# Patient Record
Sex: FEMALE | Race: WHITE | NOT HISPANIC OR LATINO | Employment: OTHER | URBAN - NONMETROPOLITAN AREA
[De-identification: names, ages, dates, MRNs, and addresses within clinical notes are randomized per-mention and may not be internally consistent; named-entity substitution may affect disease eponyms.]

---

## 2017-01-04 ENCOUNTER — TELEPHONE (OUTPATIENT)
Dept: INTERNAL MEDICINE | Facility: CLINIC | Age: 72
End: 2017-01-04

## 2017-01-04 NOTE — TELEPHONE ENCOUNTER
----- Message from Chanel Johnson sent at 1/4/2017  1:48 PM EST -----  Contact: PATIENT  TCM  1 WEEK HOSPITAL F/U [facility]  DISCHARGED 1/3/17  APPOINTMENT 1/10/17 @10:45

## 2017-01-05 ENCOUNTER — OFFICE VISIT (OUTPATIENT)
Dept: INTERNAL MEDICINE | Facility: CLINIC | Age: 72
End: 2017-01-05

## 2017-01-05 VITALS
SYSTOLIC BLOOD PRESSURE: 134 MMHG | TEMPERATURE: 98.4 F | HEART RATE: 84 BPM | OXYGEN SATURATION: 97 % | BODY MASS INDEX: 33.61 KG/M2 | HEIGHT: 61 IN | DIASTOLIC BLOOD PRESSURE: 74 MMHG | WEIGHT: 178 LBS

## 2017-01-05 DIAGNOSIS — C54.1 ENDOMETRIAL CANCER (HCC): ICD-10-CM

## 2017-01-05 DIAGNOSIS — Z09 HOSPITAL DISCHARGE FOLLOW-UP: Primary | ICD-10-CM

## 2017-01-05 DIAGNOSIS — N18.4 CKD (CHRONIC KIDNEY DISEASE) STAGE 4, GFR 15-29 ML/MIN (HCC): ICD-10-CM

## 2017-01-05 DIAGNOSIS — N30.01 ACUTE CYSTITIS WITH HEMATURIA: ICD-10-CM

## 2017-01-05 PROCEDURE — 99214 OFFICE O/P EST MOD 30 MIN: CPT | Performed by: NURSE PRACTITIONER

## 2017-01-05 RX ORDER — LEVOFLOXACIN 750 MG/1
TABLET ORAL
COMMUNITY
Start: 2017-01-03 | End: 2017-10-24

## 2017-01-05 RX ORDER — OXYCODONE HYDROCHLORIDE AND ACETAMINOPHEN 5; 325 MG/1; MG/1
TABLET ORAL
Refills: 0 | COMMUNITY
Start: 2016-12-30 | End: 2017-06-01

## 2017-01-05 NOTE — PROGRESS NOTES
"Subjective   Pamela Herrera is a 71 y.o. female.     History of Present Illness   Pt presents today for a hospital f/u.  She had her nephrostomy tubes changed on 12/30.  She had been having more pain on the right side prior to the tubes being changed.  That night around 7 pm she spiked a temperature of 102.  She was also having increased right flank pain.  She took 3 tablets of Percocet.  She was having nausea, dry heaving, chills.  She presented to the ED at .  They admitted her for complicated UTI.  She was placed on Vancomycin, Cefepime, and Diflucan.  She has been taking Levaquin since discharge also.  She has not had a fever since 48 hours before discharge.  She has one more dose of Levaquin left.  She denies nausea, diarrhea.  She has had some constipation with antibiotics, but that is starting to get better.  Her right flank pain has resolved also.  She feels much better than she did then when she went into the hospital.  She goes on 1/19 for a PET scan.  She sees the oncologist that afternoon.    She goes on 1/9 to discuss a renal diet.  The nephrologist is wanting her to see a surgeon about having a fistula placed.  They have been discussing dialysis with her, but pt is not sure that she would like follow through with this.     Blood pressure 134/74, pulse 84, temperature 98.4 °F (36.9 °C), height 61\" (154.9 cm), weight 178 lb (80.7 kg), SpO2 97 %.       The following portions of the patient's history were reviewed and updated as appropriate: allergies, current medications, past medical history and problem list.    Review of Systems   Constitutional: Positive for fatigue. Negative for fever.   HENT: Negative.    Eyes: Negative.    Respiratory: Negative.    Cardiovascular: Negative.    Gastrointestinal: Positive for constipation.   Endocrine: Negative.    Genitourinary: Positive for flank pain (resolved now).   Skin: Negative.    Allergic/Immunologic: Negative.    Neurological: Negative.    Hematological: " Negative.    Psychiatric/Behavioral: Negative.        Objective   Physical Exam   Constitutional: She is oriented to person, place, and time. She appears well-developed and well-nourished.   HENT:   Head: Normocephalic and atraumatic.   Cardiovascular: Normal rate, regular rhythm, normal heart sounds and intact distal pulses.    Pulmonary/Chest: Effort normal and breath sounds normal.   Abdominal: Soft. Bowel sounds are normal.   Musculoskeletal:   Walks with walker.   Neurological: She is alert and oriented to person, place, and time.   Skin: Skin is warm and dry.   Psychiatric: She has a normal mood and affect. Her behavior is normal. Judgment and thought content normal.   Nursing note and vitals reviewed.      Assessment/Plan   Pamela was seen today for follow-up.    Diagnoses and all orders for this visit:    Hospital discharge follow-up- UTI resolved.  Finish Levaquin as directed.  Continue to drink adequate amounts of fluid.  Perform good hand hygiene.    Acute cystitis with hematuria- since pt has infections after every nephrostomy tube change, will call Dr. Cervantes's office ( urology) and discuss if she would like pt to take prophylactic antibiotics a few days before her tube change and a few days after.    CKD (chronic kidney disease) stage 4, GFR 15-29 ml/min- keep appts with nephrology.  Discussed with pt to seriously consider starting dialysis.  Make a list of questions and discuss it with the nephrologist.    Endometrial cancer- pt has upcoming appt with oncology.        F/U PRN.

## 2017-01-05 NOTE — MR AVS SNAPSHOT
Pamela Herrera   1/5/2017 4:45 PM   Office Visit    Provider:  DARYL De La Paz   Department:  [facility] PRIMARY CARE   Dept Phone:  [phone]                Your Full Care Plan              Your Updated Medication List          This list is accurate as of: 1/5/17  5:40 PM.  Always use your most recent med list.                amLODIPine 10 MG tablet   Commonly known as:  NORVASC   Take 1 tablet by mouth Daily. As directed       CALCIUM PO       carvedilol 25 MG tablet   Commonly known as:  COREG   Take 1 tablet by mouth 2 (Two) Times a Day.       cetirizine 10 MG tablet   Commonly known as:  zyrTEC   Take 1 tablet by mouth daily.       fluticasone 50 MCG/ACT nasal spray   Commonly known as:  FLONASE   1 spray into each nostril Daily.       HYDROcodone-acetaminophen 5-325 MG per tablet   Commonly known as:  NORCO   Take 1 tablet by mouth Every 8 (Eight) Hours As Needed for moderate pain (4-6).       levoFLOXacin 750 MG tablet   Commonly known as:  LEVAQUIN       OSTEO BI-FLEX ADV JOINT SHIELD tablet       oxyCODONE-acetaminophen 5-325 MG per tablet   Commonly known as:  PERCOCET       PRENATAL PO       sodium bicarbonate 650 MG tablet   Take 1 tablet by mouth 2 (Two) Times a Day With Meals.       triamcinolone 0.1 % cream   Commonly known as:  KENALOG   Apply  topically 1 (One) Time Per Week.               Instructions     None    Patient Instructions History      MyChart Signup     Our records indicate that you have declined [facility]. If you would like to sign up for [facility], please email [email or web address] or call [phone] to obtain an activation code.             Other Info from Your Visit           Your Appointments     Pop 10, 2017 10:45 AM [facility]   Hospital Follow Up with DARYL De La Paz   [facility] PRIMARY CARE (--)    [address]  [facility] [address] [phone]            Apr 21,  "2017  8:30 AM EDT   Follow Up with DARYL De La Paz   [facility] PRIMARY CARE (--)    [address] [place] [address] [phone]           Arrive 15 minutes prior to appointment.              Allergies     Latex      Metronidazole      Penicillins      Procaine        Reason for Visit     Follow-up hosp follow up, kidney infection from nephrostomy tube change      Vital Signs     Blood Pressure Pulse Temperature Height Weight Oxygen Saturation    134/74 84 98.4 °F (36.9 °C) 61\" (154.9 cm) 178 lb (80.7 kg) 97%    Body Mass Index Smoking Status                33.63 kg/m2 Never Smoker          "

## 2017-01-06 ENCOUNTER — TELEPHONE (OUTPATIENT)
Dept: INTERNAL MEDICINE | Facility: CLINIC | Age: 72
End: 2017-01-06

## 2017-01-06 NOTE — TELEPHONE ENCOUNTER
----- Message from Emerald Arzola sent at 1/5/2017  5:41 PM EST -----  Contact: PATIENT  Patient would like to switch PCP to Dr. Wynn.  No issues, but would like female physician.

## 2017-01-12 RX ORDER — AMLODIPINE BESYLATE 10 MG/1
TABLET ORAL
Qty: 90 TABLET | Refills: 3 | Status: SHIPPED | OUTPATIENT
Start: 2017-01-12 | End: 2017-01-16 | Stop reason: SDUPTHER

## 2017-01-12 RX ORDER — CARVEDILOL 25 MG/1
TABLET ORAL
Qty: 180 TABLET | Refills: 3 | Status: SHIPPED | OUTPATIENT
Start: 2017-01-12 | End: 2017-01-16 | Stop reason: SDUPTHER

## 2017-01-16 RX ORDER — AMLODIPINE BESYLATE 10 MG/1
TABLET ORAL
Qty: 90 TABLET | Refills: 3 | Status: SHIPPED | OUTPATIENT
Start: 2017-01-16 | End: 2017-11-15 | Stop reason: SDUPTHER

## 2017-01-16 RX ORDER — CARVEDILOL 25 MG/1
TABLET ORAL
Qty: 180 TABLET | Refills: 3 | Status: SHIPPED | OUTPATIENT
Start: 2017-01-16 | End: 2017-11-15 | Stop reason: SDUPTHER

## 2017-03-31 ENCOUNTER — TELEPHONE (OUTPATIENT)
Dept: INTERNAL MEDICINE | Facility: CLINIC | Age: 72
End: 2017-03-31

## 2017-03-31 ENCOUNTER — OFFICE VISIT (OUTPATIENT)
Dept: INTERNAL MEDICINE | Facility: CLINIC | Age: 72
End: 2017-03-31

## 2017-03-31 VITALS
BODY MASS INDEX: 35.12 KG/M2 | OXYGEN SATURATION: 97 % | WEIGHT: 186 LBS | HEART RATE: 85 BPM | DIASTOLIC BLOOD PRESSURE: 70 MMHG | HEIGHT: 61 IN | TEMPERATURE: 97.4 F | SYSTOLIC BLOOD PRESSURE: 148 MMHG

## 2017-03-31 DIAGNOSIS — Z93.6 NEPHROSTOMY STATUS (HCC): Primary | ICD-10-CM

## 2017-03-31 DIAGNOSIS — N99.528 NEPHROSTOMY COMPLICATION (HCC): ICD-10-CM

## 2017-03-31 PROCEDURE — 99213 OFFICE O/P EST LOW 20 MIN: CPT | Performed by: FAMILY MEDICINE

## 2017-03-31 NOTE — PROGRESS NOTES
Subjective   Pamela Herrera is a 71 y.o. female.     History of Present Illness   Patient comes in today with her granddaughter to follow-up on a recent hospital visit.  Patient was under the impression she had been admitted to  but it was actually only seen in the ER.  She recently underwent replacement of her nephrostomy tubes.  When she was discharged she was somewhat febrile but temperature sania to 103 and she returned to .  Dr. Jarquin with gyn/onc evaluated patient and felt she was appropriate for discharge from the ER.  Patient reports completing antibiotics that her urologist, Dr. Allen, prescribed.  She is feeling fine at this time and is here as she was told to follow-up.  She has a 6 month follow-up scheduled in April with Mary Almendarez, our nurse practitioner who is no longer here.    The following portions of the patient's history were reviewed and updated as appropriate: allergies, current medications, past family history, past medical history, past social history, past surgical history and problem list.    Review of Systems   Constitutional: Negative for fever.   Cardiovascular: Positive for leg swelling.       Objective   Physical Exam   Constitutional: Vital signs are normal. She appears well-developed. She has a sickly appearance. She does not appear ill.   HENT:   Right Ear: Hearing normal.   Left Ear: Hearing normal.   Eyes: EOM are normal. No scleral icterus.   Pulmonary/Chest: Effort normal.   Genitourinary:   Genitourinary Comments: Nephrostomy tubes entering back bilaterally, covered with bandages but appear clean, dry, no surrounding erythema. Urine noted in bags bilateral legs, yellow and clear without sediment.   Musculoskeletal: She exhibits edema (bilateral lower extremities).   Neurological: She is alert. No cranial nerve deficit.   Skin: She is not diaphoretic.   Psychiatric: She has a normal mood and affect.   Nursing note and vitals reviewed.    I reviewed her [facility] notes.    Britton stated there was nothing to do from a gynecology oncology standpoint and recommended following up if she becomes febrile again.  ER note reads the same, they recommended she complete antibiotics that were started by Dr. Allen.  I also reviewed the consult note from radiology from March 24 where they recommended nephrostomy tube exchange.    Assessment/Plan   Pamela was seen today for chronic kidney disease.    Diagnoses and all orders for this visit:    Nephrostomy status    Nephrostomy complication    No medicine changes at this time, needs to follow-up as scheduled but needs to discuss with  who she will see.  She is scheduled to see the nurse practitioner who is after practice.  Follow-up with urology as scheduled.

## 2017-04-21 ENCOUNTER — TELEPHONE (OUTPATIENT)
Dept: INTERNAL MEDICINE | Facility: CLINIC | Age: 72
End: 2017-04-21

## 2017-04-21 DIAGNOSIS — Z93.6 NEPHROSTOMY STATUS (HCC): Primary | ICD-10-CM

## 2017-04-21 NOTE — TELEPHONE ENCOUNTER
Below message sent through Ms. Reynolds's My Chart Account. I asked her to set up Ms. Herrera's proxy so that documentation can be appropriate.    This is my mother [birth year] Pamela Herrera's nephrostomy tube site. As you can see some idiot stitched through her tube to secure it to her skin. She is scheduled for tube change Friday 21 April at 12pm. She has been going to  since October 2014 and there seems to be more and more craziness that happens with her. Whether it be stupid stuff or God bless just Carey's law takes over. I am curious about switching her to a urologist, nephrologist, interventional radiologist in [place]. Is there someone that you know of that would be able to handle her complex case. I know you have access to her records but if you need clarification of anything I am willing to assist. Please call so that we can discuss [phone] Also it is time for me to get new [facility] paperwork submitted. It should be coming to your office any day.       Emi, will you see if Dr. Falcon's office can handle her issues? If so, I'll put in the referral. Thanks!

## 2017-04-21 NOTE — TELEPHONE ENCOUNTER
Dr. Falcon looked at her chart and he suggests that she see Dr. Ratliff since she has a nephrostomy tube. Apparently Dr. Falcon tends to send these patients to him.

## 2017-06-01 ENCOUNTER — OFFICE VISIT (OUTPATIENT)
Dept: INTERNAL MEDICINE | Facility: CLINIC | Age: 72
End: 2017-06-01

## 2017-06-01 ENCOUNTER — TELEPHONE (OUTPATIENT)
Dept: INTERNAL MEDICINE | Facility: CLINIC | Age: 72
End: 2017-06-01

## 2017-06-01 VITALS
OXYGEN SATURATION: 97 % | TEMPERATURE: 97.7 F | BODY MASS INDEX: 35.57 KG/M2 | HEIGHT: 61 IN | HEART RATE: 76 BPM | SYSTOLIC BLOOD PRESSURE: 138 MMHG | DIASTOLIC BLOOD PRESSURE: 70 MMHG | WEIGHT: 188.4 LBS

## 2017-06-01 DIAGNOSIS — D63.1 ANEMIA IN CHRONIC KIDNEY DISEASE: ICD-10-CM

## 2017-06-01 DIAGNOSIS — N18.4 CKD (CHRONIC KIDNEY DISEASE) STAGE 4, GFR 15-29 ML/MIN (HCC): Primary | ICD-10-CM

## 2017-06-01 DIAGNOSIS — I77.0 AV FISTULA (HCC): ICD-10-CM

## 2017-06-01 DIAGNOSIS — I10 ESSENTIAL HYPERTENSION: ICD-10-CM

## 2017-06-01 DIAGNOSIS — J30.89 ENVIRONMENTAL AND SEASONAL ALLERGIES: ICD-10-CM

## 2017-06-01 DIAGNOSIS — N18.9 ANEMIA IN CHRONIC KIDNEY DISEASE: ICD-10-CM

## 2017-06-01 PROCEDURE — 99214 OFFICE O/P EST MOD 30 MIN: CPT | Performed by: FAMILY MEDICINE

## 2017-06-01 RX ORDER — CLINDAMYCIN HYDROCHLORIDE 150 MG/1
CAPSULE ORAL
Refills: 0 | COMMUNITY
Start: 2017-04-20 | End: 2018-04-23

## 2017-06-01 RX ORDER — LORATADINE 10 MG/1
10 TABLET ORAL EVERY OTHER DAY
COMMUNITY
End: 2017-10-24

## 2017-06-01 RX ORDER — FLUCONAZOLE 150 MG/1
TABLET ORAL
Refills: 0 | COMMUNITY
Start: 2017-05-11 | End: 2017-10-24

## 2017-06-01 NOTE — TELEPHONE ENCOUNTER
CALLED PATIENT TO GET MORE INFORMATION. SHE SAID IT'S NOT REALLY PAIN IN HER LEGS, IT'S MORE OF A CRAMPING. HAS SWELLING, BUT SHE STATES THIS IS NOT USUAL FOR HER. THE CRAMPING IN HER LEGS OCCURS MAINLY AT NIGHT. NO SOA.

## 2017-06-01 NOTE — TELEPHONE ENCOUNTER
ATTEMPTED TO CALL PATIENT, NO ANSWER, LEFT A DETAILED MESSAGE OF DR VAN'S SUGGESTION. ASK HER TO CALL THE OFFICE TOMORROW MORNING IF SHE HAS ANY QUESTIONS.

## 2017-06-01 NOTE — TELEPHONE ENCOUNTER
PLEASE RETURN CALL-PATIENT HAS BEEN HAVING LEG PAIN/LEG CRAMPING AT NIGHT,AND WOULD LIKE TO KNOW IF SHE GET THE CREAM THAT HER DAUGHTER IN LAW PUT ON HER CALLED INTO THE PHARMACY? PATIENT SAID IT HELPS WITH THE CRAMPING AND PAIN. CAN YOU PLEASE CALL PAIN CREAM (COMPOUND)- LIDOCAINE POWDER, PRILOCAINE POWDER, GABAPENTIN POWDER & NABUMETONE 500 MG TAB. ALL ARE 2.25%    PLEASE SEND TO [facility]

## 2017-06-01 NOTE — PROGRESS NOTES
Subjective   Pamela Herrera is a 71 y.o. female.     History of Present Illness   Patient here to follow up. Has history of diabetes and was previously on medicine for it but sugars now running around 100 with diet alone. Has had AVF placed on left arm since last visit and her urostomy tubes replaced. Following up with vascular later today. Continues care with nephrology for CRF. No immediate needs today. Essential hypertension is well controlled with amlodipine. She's taking Claritin 10 mg daily for allergies.     The following portions of the patient's history were reviewed and updated as appropriate: allergies, current medications, past family history, past medical history, past social history, past surgical history and problem list.    Review of Systems   Constitutional: Negative for activity change.   Respiratory: Negative for shortness of breath.    Cardiovascular: Negative for chest pain.   Genitourinary: Negative for menstrual problem and vaginal bleeding.       Vitals:    06/01/17 0834   BP: 138/70   Pulse: 76   Temp: 97.7 °F (36.5 °C)   SpO2: 97%       Objective   Physical Exam   Constitutional: She is oriented to person, place, and time. Vital signs are normal. She appears well-developed and well-nourished. She is active. She has a sickly appearance. She does not appear ill.   Appears stated age. Well groomed. Obese     HENT:   Head: Normocephalic and atraumatic.   Right Ear: Hearing normal.   Left Ear: Hearing normal.   Nose: Nose normal.   Eyes: EOM and lids are normal. Pupils are equal, round, and reactive to light.   Wearing glasses     Cardiovascular: Normal rate and regular rhythm.    Murmur heard.   Systolic murmur is present with a grade of 3/6   Palpable thrill to AV fistula on left arm above antecubital fossa   Pulmonary/Chest: Effort normal and breath sounds normal. She has no rales.   Musculoskeletal: She exhibits no deformity.   Neurological: She is alert and oriented to person, place, and  time. No cranial nerve deficit. Gait normal.   Skin: Skin is warm. She is not diaphoretic. No cyanosis. There is pallor. Nails show no clubbing.        Psychiatric: She has a normal mood and affect. Her speech is normal and behavior is normal. Judgment and thought content normal. Cognition and memory are normal.   Nursing note and vitals reviewed.       Reviewed records from [facility]. PET scan in May negative for signs of cancer. Vascular study of fistula showed patency but possible stenosis. Labs show stable hemoglobin 9-10 (last check 10.0 5/10/17), last creatinine 3.88. Glucose level 112 on 5/10/17 at 1338. All glucose levels on labs appropriate/normal range.    Assessment/Plan   Pamela was seen today for chronic kidney disease.    Diagnoses and all orders for this visit:    CKD (chronic kidney disease) stage 4, GFR 15-29 ml/min    Anemia in chronic kidney disease    Essential hypertension    Environmental and seasonal allergies    AV fistula    blood pressure controlled. Continue current medicine. Discussed use of Claritin, space out to every 48 hours based on renal function. Blood levels stable, appropriate for chronic kidney disease. Follow up with specialists as scheduled, needs to discuss possible stenosis of AV fistula with vascular surgery today. Good thrill noted.            Michelle Wynn MD

## 2017-06-01 NOTE — TELEPHONE ENCOUNTER
Have her ask kidney doctor, may be electrolyte issue. Normally I have patients try magnesium supplements for this, but she needs to ask her kidney doctor first.

## 2017-06-30 ENCOUNTER — TELEPHONE (OUTPATIENT)
Dept: INTERNAL MEDICINE | Facility: CLINIC | Age: 72
End: 2017-06-30

## 2017-06-30 NOTE — TELEPHONE ENCOUNTER
I have to see her back since its been a month since that discussion. She should also ask her [facility] provider.

## 2017-06-30 NOTE — TELEPHONE ENCOUNTER
Patient called requesting to see if she could get a refill on Hydrocodone. I was unable to locate on her Rx list. She stated that she got it from [facility] and that she informed Dr Wynn that she was on it and she did not need a refill at the time. She would like a call back with a status to see if she is able to write it for her.

## 2017-07-20 ENCOUNTER — OFFICE VISIT (OUTPATIENT)
Dept: INTERNAL MEDICINE | Facility: CLINIC | Age: 72
End: 2017-07-20

## 2017-07-20 VITALS
SYSTOLIC BLOOD PRESSURE: 122 MMHG | WEIGHT: 186 LBS | OXYGEN SATURATION: 97 % | DIASTOLIC BLOOD PRESSURE: 70 MMHG | TEMPERATURE: 99.1 F | HEIGHT: 61 IN | BODY MASS INDEX: 35.12 KG/M2 | HEART RATE: 87 BPM

## 2017-07-20 DIAGNOSIS — M25.562 CHRONIC PAIN OF BOTH KNEES: Primary | ICD-10-CM

## 2017-07-20 DIAGNOSIS — M54.32 SCIATICA OF LEFT SIDE: ICD-10-CM

## 2017-07-20 DIAGNOSIS — J30.89 ENVIRONMENTAL AND SEASONAL ALLERGIES: ICD-10-CM

## 2017-07-20 DIAGNOSIS — G89.29 CHRONIC PAIN OF BOTH KNEES: Primary | ICD-10-CM

## 2017-07-20 DIAGNOSIS — M25.561 CHRONIC PAIN OF BOTH KNEES: Primary | ICD-10-CM

## 2017-07-20 DIAGNOSIS — N18.4 CKD (CHRONIC KIDNEY DISEASE) STAGE 4, GFR 15-29 ML/MIN (HCC): ICD-10-CM

## 2017-07-20 PROCEDURE — 99214 OFFICE O/P EST MOD 30 MIN: CPT | Performed by: FAMILY MEDICINE

## 2017-07-20 RX ORDER — GABAPENTIN 100 MG/1
100 CAPSULE ORAL 3 TIMES DAILY PRN
Qty: 90 CAPSULE | Refills: 2 | Status: SHIPPED | OUTPATIENT
Start: 2017-07-20 | End: 2017-10-24 | Stop reason: SDUPTHER

## 2017-07-20 RX ORDER — HYDROCODONE BITARTRATE AND ACETAMINOPHEN 5; 325 MG/1; MG/1
1 TABLET ORAL EVERY 8 HOURS PRN
Qty: 30 TABLET | Refills: 0 | Status: SHIPPED | OUTPATIENT
Start: 2017-07-20

## 2017-07-20 NOTE — PROGRESS NOTES
Subjective    Pamela Herrera is a 72 y.o. female here for:  Chief Complaint   Patient presents with   • Knee Pain     pain in both knees and feet also allergy issues     History of Present Illness   She has long-standing aching pain and swelling in both knees, left feels a bit worse than right. She suffers lymphedema in the right leg. She was seeing orthopedics in the past. Around the time of scheduling a knee replacement she was diagnosed with cancer so the knee issue got pushed aside. She is feeling better now and would like to discuss them again, though she does not feel surgery is right for her at this time. She's not tried injections. The doctor told her before her knees were bone on bone. She's been using Aspercreme and she tried some of her daughter's topical compounded medicine and it helped some. She cannot take NSAIDs due to chronic kidney disease. She has taken Norco on occasion and it helps some. She has pain that shoots down the left leg, sciatica, and she was told by her kidney doctor she could take gabapentin at lower doses. She'd like to try that.     At a past visit I told her to take her Claritin every other day due to chronic kidney disease. She stopped taking it and now she's congested and having symptoms again.    The following portions of the patient's history were reviewed and updated as appropriate: allergies, current medications, past family history, past medical history, past social history, past surgical history and problem list.    Review of Systems   Constitutional: Negative for activity change.   HENT: Positive for congestion and rhinorrhea.    Musculoskeletal: Positive for arthralgias and joint swelling.   Neurological: Positive for numbness.       Vitals:    07/20/17 1208   BP: 122/70   Pulse: 87   Temp: 99.1 °F (37.3 °C)   SpO2: 97%       Objective   Physical Exam   Constitutional: She is oriented to person, place, and time. Vital signs are normal. She appears well-developed and  well-nourished. She is active.  Non-toxic appearance. She has a sickly appearance. She does not appear ill.   Appears stated age. Well groomed. Obese.     HENT:   Head: Normocephalic and atraumatic.   Right Ear: Hearing normal.   Left Ear: Hearing normal.   Nose: Nose normal.   Eyes: EOM and lids are normal. Pupils are equal, round, and reactive to light. No scleral icterus.   Cardiovascular: Normal rate and regular rhythm.    Murmur heard.   Systolic murmur is present with a grade of 3/6   Pulmonary/Chest: Effort normal and breath sounds normal.   Genitourinary:   Genitourinary Comments: Urostomy  Bags on both legs, anchored   Musculoskeletal: She exhibits no deformity.        Right knee: She exhibits swelling and abnormal patellar mobility (severe crepitus). No tenderness found.        Left knee: She exhibits swelling, effusion and abnormal patellar mobility (severe crepitus). Tenderness found. Lateral joint line tenderness noted.   Neurological: She is alert and oriented to person, place, and time. No cranial nerve deficit. Gait normal.   Skin: Skin is warm. She is not diaphoretic. No cyanosis. Nails show no clubbing.   Psychiatric: She has a normal mood and affect. Her behavior is normal. Judgment and thought content normal.   Nursing note and vitals reviewed.    6/1/17 creatinine 3.88 at [facility] per scanned labs.    Assessment/Plan   Pamela was seen today for knee pain.    Diagnoses and all orders for this visit:    Chronic pain of both knees  Comments:  Discussed Schedule II status. Drug contract signed. RODRICK requested. Take narcotic only as needed. No ortho referral at this time.  Orders:  -     HYDROcodone-acetaminophen (NORCO) 5-325 MG per tablet; Take 1 tablet by mouth Every 8 (Eight) Hours As Needed for Moderate Pain .    Sciatica of left side  Comments:  Discussed Schedule V status. Will resume at a low dose as needed. She'll likely take mainly at night.  Orders:  -     gabapentin (NEURONTIN) 100 MG capsule;  Take 1 capsule by mouth 3 (Three) Times a Day As Needed (pain).    CKD (chronic kidney disease) stage 4, GFR 15-29 ml/min  Comments:  Follow up with nephrology. Urine in nephrostomy bags today appeared clear, light yellow.    Environmental and seasonal allergies  Comments:  Resume claritin every other day due to creatinine clearance.               Michelle Wynn MD

## 2017-10-24 ENCOUNTER — OFFICE VISIT (OUTPATIENT)
Dept: INTERNAL MEDICINE | Facility: CLINIC | Age: 72
End: 2017-10-24

## 2017-10-24 VITALS
WEIGHT: 195 LBS | RESPIRATION RATE: 14 BRPM | OXYGEN SATURATION: 96 % | HEIGHT: 61 IN | SYSTOLIC BLOOD PRESSURE: 138 MMHG | TEMPERATURE: 98 F | HEART RATE: 80 BPM | BODY MASS INDEX: 36.82 KG/M2 | DIASTOLIC BLOOD PRESSURE: 72 MMHG

## 2017-10-24 DIAGNOSIS — N18.4 CKD (CHRONIC KIDNEY DISEASE) STAGE 4, GFR 15-29 ML/MIN (HCC): ICD-10-CM

## 2017-10-24 DIAGNOSIS — I10 ESSENTIAL HYPERTENSION: ICD-10-CM

## 2017-10-24 DIAGNOSIS — Z28.21 INFLUENZA VACCINATION DECLINED: ICD-10-CM

## 2017-10-24 DIAGNOSIS — Z28.21 VACCINATION NOT CARRIED OUT BECAUSE OF PATIENT REFUSAL: ICD-10-CM

## 2017-10-24 DIAGNOSIS — M54.32 SCIATICA OF LEFT SIDE: Primary | ICD-10-CM

## 2017-10-24 PROCEDURE — 99213 OFFICE O/P EST LOW 20 MIN: CPT | Performed by: FAMILY MEDICINE

## 2017-10-24 RX ORDER — GABAPENTIN 100 MG/1
100 CAPSULE ORAL 3 TIMES DAILY PRN
Qty: 90 CAPSULE | Refills: 2 | Status: SHIPPED | OUTPATIENT
Start: 2017-10-24 | End: 2018-07-20 | Stop reason: SDUPTHER

## 2017-10-24 NOTE — PROGRESS NOTES
Subjective    Pamela Herrera is a 72 y.o. female here for:  Chief Complaint   Patient presents with   • Hypertension     History of Present Illness   Here to follow up. Has chronic pain but has not taken Norco in some time. Takes gabapentin sometimes at night. Hardly ever takes more than two a day. It helps her rest. AV fistula is going to be assessed soon for maturity. Does not need other medicine refilled. Hypertension chronic issue and controlled. Not interested in other preventative health measures at this time.    The following portions of the patient's history were reviewed and updated as appropriate: allergies, current medications, past family history, past medical history, past social history, past surgical history and problem list.    Review of Systems   Constitutional: Positive for fatigue.   Musculoskeletal: Positive for arthralgias.   Neurological: Positive for weakness.       Vitals:    10/24/17 1617   BP: 138/72   Pulse: 80   Resp: 14   Temp: 98 °F (36.7 °C)   SpO2: 96%       Objective   Physical Exam   Constitutional: She is oriented to person, place, and time. Vital signs are normal. She appears well-developed and well-nourished. She is active.  Non-toxic appearance. She has a sickly appearance. She does not appear ill.   Appears stated age. Well groomed. Obese.   HENT:   Head: Normocephalic and atraumatic.   Right Ear: Hearing normal.   Left Ear: Hearing normal.   Nose: Nose normal.   Eyes: EOM and lids are normal. Pupils are equal, round, and reactive to light. No scleral icterus.   Wearing glasses.   Cardiovascular: Normal rate and regular rhythm.    Murmur heard.   Systolic murmur is present with a grade of 3/6   Pulmonary/Chest: Effort normal and breath sounds normal.   Genitourinary:   Genitourinary Comments: Urostomy  Bags on both legs, anchored   Musculoskeletal: She exhibits no deformity.   Neurological: She is alert and oriented to person, place, and time. No cranial nerve deficit. Gait  (requires use of rolling walker) abnormal.   Skin: Skin is warm. She is not diaphoretic. No cyanosis. There is pallor (baseline).   Psychiatric: She has a normal mood and affect. Her behavior is normal. Judgment and thought content normal.   Nursing note and vitals reviewed.      Assessment/Plan   Pamela was seen today for hypertension.    Diagnoses and all orders for this visit:    Sciatica of left side  Comments:  Mainly taking at night. Can refill by call in between visits.  Orders:  -     gabapentin (NEURONTIN) 100 MG capsule; Take 1 capsule by mouth 3 (Three) Times a Day As Needed (pain).    CKD (chronic kidney disease) stage 4, GFR 15-29 ml/min    Essential hypertension    Influenza vaccination declined    Vaccination not carried out because of patient refusal      RODRICK reviewed and appropriate.  Completed some fmla forms for daughter so she can transport and help with mom's care  Can call for next gabapentin refill, I'd like to keep her away from the doctor's office as much as I can to prevent further illness  Follow up with all specialists as scheduled  Blood pressure controlled, follow up with nephrology  Does not want flu or pneumonia shots.         Michelle Wynn MD

## 2017-10-28 DIAGNOSIS — M54.32 SCIATICA OF LEFT SIDE: ICD-10-CM

## 2017-10-30 RX ORDER — GABAPENTIN 100 MG/1
CAPSULE ORAL
Qty: 90 CAPSULE | Refills: 2 | OUTPATIENT
Start: 2017-10-30

## 2017-11-16 RX ORDER — AMLODIPINE BESYLATE 10 MG/1
TABLET ORAL
Qty: 90 TABLET | Refills: 2 | Status: SHIPPED | OUTPATIENT
Start: 2017-11-16 | End: 2018-07-30 | Stop reason: SDUPTHER

## 2017-11-16 RX ORDER — CARVEDILOL 25 MG/1
TABLET ORAL
Qty: 180 TABLET | Refills: 2 | Status: SHIPPED | OUTPATIENT
Start: 2017-11-16 | End: 2018-07-30 | Stop reason: SDUPTHER

## 2017-12-01 ENCOUNTER — TELEPHONE (OUTPATIENT)
Dept: INTERNAL MEDICINE | Facility: CLINIC | Age: 72
End: 2017-12-01

## 2017-12-01 RX ORDER — TRIAMCINOLONE ACETONIDE 1 MG/G
CREAM TOPICAL WEEKLY
Qty: 45 G | Refills: 5 | Status: SHIPPED | OUTPATIENT
Start: 2017-12-01

## 2018-01-05 RX ORDER — FLUTICASONE PROPIONATE 50 MCG
1 SPRAY, SUSPENSION (ML) NASAL DAILY
Qty: 3 BOTTLE | Refills: 3 | Status: SHIPPED | OUTPATIENT
Start: 2018-01-05 | End: 2019-10-16 | Stop reason: SDUPTHER

## 2018-04-16 ENCOUNTER — OFFICE VISIT (OUTPATIENT)
Dept: INTERNAL MEDICINE | Facility: CLINIC | Age: 73
End: 2018-04-16

## 2018-04-16 VITALS
SYSTOLIC BLOOD PRESSURE: 138 MMHG | OXYGEN SATURATION: 100 % | HEIGHT: 61 IN | TEMPERATURE: 96.7 F | DIASTOLIC BLOOD PRESSURE: 72 MMHG | BODY MASS INDEX: 37.29 KG/M2 | RESPIRATION RATE: 16 BRPM | WEIGHT: 197.5 LBS | HEART RATE: 72 BPM

## 2018-04-16 DIAGNOSIS — K92.2 GASTROINTESTINAL HEMORRHAGE, UNSPECIFIED GASTROINTESTINAL HEMORRHAGE TYPE: ICD-10-CM

## 2018-04-16 DIAGNOSIS — R21 SKIN ERUPTION: ICD-10-CM

## 2018-04-16 DIAGNOSIS — Z09 HOSPITAL DISCHARGE FOLLOW-UP: Primary | ICD-10-CM

## 2018-04-16 LAB
ALBUMIN SERPL-MCNC: 3.2 G/DL (ref 3.5–5)
ALBUMIN/GLOB SERPL: 1.2 G/DL (ref 1–2)
ALP SERPL-CCNC: 62 U/L (ref 38–126)
ALT SERPL-CCNC: 21 U/L (ref 13–69)
AST SERPL-CCNC: 15 U/L (ref 15–46)
BILIRUB SERPL-MCNC: 0.3 MG/DL (ref 0.2–1.3)
BUN SERPL-MCNC: 56 MG/DL (ref 7–20)
BUN/CREAT SERPL: 15.1 (ref 7.1–23.5)
CALCIUM SERPL-MCNC: 8.1 MG/DL (ref 8.4–10.2)
CHLORIDE SERPL-SCNC: 106 MMOL/L (ref 98–107)
CO2 SERPL-SCNC: 24 MMOL/L (ref 26–30)
CREAT SERPL-MCNC: 3.7 MG/DL (ref 0.6–1.3)
ERYTHROCYTE [DISTWIDTH] IN BLOOD BY AUTOMATED COUNT: 15.4 % (ref 11.5–14.5)
GFR SERPLBLD CREATININE-BSD FMLA CKD-EPI: 12 ML/MIN/1.73
GFR SERPLBLD CREATININE-BSD FMLA CKD-EPI: 15 ML/MIN/1.73
GLOBULIN SER CALC-MCNC: 2.6 GM/DL
GLUCOSE SERPL-MCNC: 91 MG/DL (ref 74–98)
HCT VFR BLD AUTO: 29.3 % (ref 37–47)
HGB BLD-MCNC: 9.5 G/DL (ref 12–16)
MCH RBC QN AUTO: 30 PG (ref 27–31)
MCHC RBC AUTO-ENTMCNC: 32.4 G/DL (ref 30–37)
MCV RBC AUTO: 92.4 FL (ref 81–99)
PLATELET # BLD AUTO: 139 10*3/MM3 (ref 130–400)
POTASSIUM SERPL-SCNC: 5 MMOL/L (ref 3.5–5.1)
PROT SERPL-MCNC: 5.8 G/DL (ref 6.3–8.2)
RBC # BLD AUTO: 3.17 10*6/MM3 (ref 4.2–5.4)
SODIUM SERPL-SCNC: 141 MMOL/L (ref 137–145)
WBC # BLD AUTO: 7.19 10*3/MM3 (ref 4.8–10.8)

## 2018-04-16 PROCEDURE — 99214 OFFICE O/P EST MOD 30 MIN: CPT | Performed by: NURSE PRACTITIONER

## 2018-04-16 RX ORDER — SODIUM BICARBONATE 650 MG/1
1300 TABLET ORAL 2 TIMES DAILY
COMMUNITY
End: 2020-01-21

## 2018-04-16 RX ORDER — PANTOPRAZOLE SODIUM 40 MG/1
40 TABLET, DELAYED RELEASE ORAL DAILY
COMMUNITY
End: 2018-10-25 | Stop reason: SDUPTHER

## 2018-04-16 RX ORDER — LEVOFLOXACIN 250 MG/1
TABLET ORAL
Refills: 0 | COMMUNITY
Start: 2018-03-13 | End: 2018-04-23

## 2018-04-16 NOTE — PROGRESS NOTES
Chief Complaint / Reason:      Chief Complaint   Patient presents with   • GI Bleeding     hospital f/u-GI bleed       Subjective     HPI  Patient presents today for hospital follow-up regarding GI bleed.  Admission day was April 9 and discharge date April 15.  She is accompanied by her daughter who is a nurse.  She states that symptoms started Sunday night and she had 4-6 bloody bowel movements and she progressively worsened.  The patient went back and forth from [facility] to .  She was admitted to the [facility] initially and she required 4 units of packed red blood as her H&H was 6.5 and then came down to 6.1.  She ended up requiring 3 more units of packed red blood cells.  She had an EGD and colonoscopy.  CTA was conducted and active intestinal bleeding site identified in the distal ileum.  According to the daughter she stated that they had told her that she had a small intestinal bleed and it was true clean down.  She had nuclear med study and interventional radiology.  Also it was found that she had diverticulosis in the sigmoid colon and proximal ascending colon superficial ulcerations in the sigmoid colon which were biopsied.  Solitary ulcer versus superficial scope trauma and ascending colon with mild oozing and a clip was placed.  By the time of discharge patient had received a total of 7 units total transfusion.  Baseline hemoglobin is 9-10.  Patient was started on Protonix daily We will repeat CBC today.  Patient has a rash and when she did not have going into the hospital.  It developed following discharge.  Colonoscopy 4/10/18  EGD 4/10/18  CTA 4/11/18  IR attempted embolization 4/12/18  Colonoscopy 4/13/18  History taken from: patient    PMH/FH/Social History were reviewed and updated appropriately in the electronic medical record.     Review of Systems:   Review of Systems   Constitutional: Positive for fatigue.   Respiratory: Negative.    Cardiovascular: Negative.     Gastrointestinal: Negative.    Musculoskeletal: Positive for arthralgias and myalgias.   Skin: Positive for rash.   Neurological: Positive for weakness.     All other systems were reviewed and are negative.  Exceptions are noted in the subjective or above.      Objective     Vital Signs  Vitals:    04/16/18 1315   BP: 138/72   Pulse: 72   Resp: 16   Temp: 96.7 °F (35.9 °C)   SpO2: 100%       Body mass index is 37.32 kg/m².    Physical Exam   Constitutional: She is oriented to person, place, and time. Vital signs are normal. She appears well-developed and well-nourished. She is active.  Non-toxic appearance. She has a sickly appearance. She does not appear ill. No distress.   Appears stated age. Well groomed. Obese.     HENT:   Head: Normocephalic and atraumatic.   Right Ear: Hearing normal.   Left Ear: Hearing normal.   Nose: Nose normal.   Eyes: EOM and lids are normal. Pupils are equal, round, and reactive to light. No scleral icterus.   Cardiovascular: Normal rate, regular rhythm, normal heart sounds and intact distal pulses.       Systolic murmur is present   Pulmonary/Chest: Effort normal and breath sounds normal. She has no wheezes. She exhibits no tenderness.   Genitourinary:   Genitourinary Comments: Urostomy  Bags on both legs, anchored. Right urostomy bag had sediment and is very cloudy.   Musculoskeletal: She exhibits no deformity.   Neurological: She is alert and oriented to person, place, and time. No cranial nerve deficit. Gait normal.   Skin: Skin is warm and dry. Petechiae and rash noted. Rash is maculopapular. She is not diaphoretic. There is erythema. No cyanosis. No pallor. Nails show no clubbing.        Psychiatric: She has a normal mood and affect. Her behavior is normal. Judgment and thought content normal.   Nursing note and vitals reviewed.       Results Review:    I reviewed the patient's previous clinical results and discharge information from .       Medication Review:     Current  Outpatient Prescriptions:   •  amLODIPine (NORVASC) 10 MG tablet, TAKE 1 TABLET EVERY DAY, Disp: 90 tablet, Rfl: 2  •  CALCIUM PO, Take  by mouth., Disp: , Rfl:   •  carvedilol (COREG) 25 MG tablet, TAKE 1 TABLET BY MOUTH TWICE A DAY, Disp: 180 tablet, Rfl: 2  •  fluticasone (FLONASE) 50 MCG/ACT nasal spray, 1 spray into each nostril Daily., Disp: 3 bottle, Rfl: 3  •  gabapentin (NEURONTIN) 100 MG capsule, Take 1 capsule by mouth 3 (Three) Times a Day As Needed (pain)., Disp: 90 capsule, Rfl: 2  •  HYDROcodone-acetaminophen (NORCO) 5-325 MG per tablet, Take 1 tablet by mouth Every 8 (Eight) Hours As Needed for Moderate Pain ., Disp: 30 tablet, Rfl: 0  •  Misc Natural Products (OSTEO BI-FLEX ADV JOINT SHIELD) tablet, Take  by mouth., Disp: , Rfl:   •  pantoprazole (PROTONIX) 40 MG EC tablet, Take 40 mg by mouth Daily., Disp: , Rfl:   •  Prenatal Vit-Fe Fumarate-FA (PRENATAL PO), Take  by mouth., Disp: , Rfl:   •  Probiotic Product (PROBIOTIC-10 PO), Take  by mouth., Disp: , Rfl:   •  sodium bicarbonate 650 MG tablet, Take 1 tablet by mouth 2 (Two) Times a Day With Meals., Disp: 60 tablet, Rfl: 5  •  sodium bicarbonate 650 MG tablet, Take 1,300 mg by mouth 2 (Two) Times a Day., Disp: , Rfl:   •  triamcinolone (KENALOG) 0.1 % cream, Apply  topically 1 (One) Time Per Week., Disp: 45 g, Rfl: 5    Assessment/Plan   Pamela was seen today for gi bleeding.    Diagnoses and all orders for this visit:    Hospital discharge follow-up  Reviewed hospital discharge and lab results.  Gastrointestinal hemorrhage, unspecified gastrointestinal hemorrhage type  -     Comprehensive Metabolic Panel  -     CBC (No Diff)    Skin eruption  Closely monitor and discussed worsening signs and symptoms.  Discussed possibility of rash related to blood transfusions    Return if symptoms worsen or fail to improve.    Radha Santana, APRN  04/16/2018

## 2018-04-23 ENCOUNTER — OFFICE VISIT (OUTPATIENT)
Dept: INTERNAL MEDICINE | Facility: CLINIC | Age: 73
End: 2018-04-23

## 2018-04-23 ENCOUNTER — HOSPITAL ENCOUNTER (OUTPATIENT)
Dept: CARDIOLOGY | Facility: HOSPITAL | Age: 73
Discharge: HOME | End: 2018-04-23
Attending: FAMILY MEDICINE | Admitting: FAMILY MEDICINE

## 2018-04-23 VITALS
SYSTOLIC BLOOD PRESSURE: 124 MMHG | HEIGHT: 61 IN | DIASTOLIC BLOOD PRESSURE: 70 MMHG | WEIGHT: 200 LBS | TEMPERATURE: 97.6 F | RESPIRATION RATE: 14 BRPM | HEART RATE: 86 BPM | BODY MASS INDEX: 37.76 KG/M2 | OXYGEN SATURATION: 98 %

## 2018-04-23 DIAGNOSIS — I89.0 LYMPHEDEMA OF RIGHT LOWER EXTREMITY: ICD-10-CM

## 2018-04-23 DIAGNOSIS — I10 ESSENTIAL HYPERTENSION: ICD-10-CM

## 2018-04-23 DIAGNOSIS — I89.0 LYMPHEDEMA OF RIGHT LOWER EXTREMITY: Primary | ICD-10-CM

## 2018-04-23 PROCEDURE — 93971 EXTREMITY STUDY: CPT | Performed by: INTERNAL MEDICINE

## 2018-04-23 PROCEDURE — 93971 EXTREMITY STUDY: CPT

## 2018-04-23 PROCEDURE — 99213 OFFICE O/P EST LOW 20 MIN: CPT | Performed by: FAMILY MEDICINE

## 2018-04-24 PROBLEM — I89.0 LYMPHEDEMA OF RIGHT LOWER EXTREMITY: Status: ACTIVE | Noted: 2018-04-24

## 2018-04-24 LAB
BH CV ECHO MEAS - BSA(HAYCOCK): 2 M^2
BH CV ECHO MEAS - BSA: 1.9 M^2
BH CV ECHO MEAS - BZI_BMI: 37.8 KILOGRAMS/M^2
BH CV ECHO MEAS - BZI_METRIC_HEIGHT: 154.9 CM
BH CV ECHO MEAS - BZI_METRIC_WEIGHT: 90.7 KG
BH CV LOWER VASCULAR LEFT COMMON FEMORAL AUGMENT: NORMAL
BH CV LOWER VASCULAR LEFT COMMON FEMORAL COMPRESS: NORMAL
BH CV LOWER VASCULAR LEFT COMMON FEMORAL PHASIC: NORMAL
BH CV LOWER VASCULAR LEFT COMMON FEMORAL SPONT: NORMAL
BH CV LOWER VASCULAR RIGHT COMMON FEMORAL AUGMENT: NORMAL
BH CV LOWER VASCULAR RIGHT COMMON FEMORAL COMPRESS: NORMAL
BH CV LOWER VASCULAR RIGHT COMMON FEMORAL PHASIC: NORMAL
BH CV LOWER VASCULAR RIGHT COMMON FEMORAL SPONT: NORMAL
BH CV LOWER VASCULAR RIGHT DISTAL FEMORAL COMPRESS: NORMAL
BH CV LOWER VASCULAR RIGHT GASTRONEMIUS COMPRESS: NORMAL
BH CV LOWER VASCULAR RIGHT GREATER SAPH AK COMPRESS: NORMAL
BH CV LOWER VASCULAR RIGHT GREATER SAPH BK COMPRESS: NORMAL
BH CV LOWER VASCULAR RIGHT LESSER SAPH COMPRESS: NORMAL
BH CV LOWER VASCULAR RIGHT MID FEMORAL AUGMENT: NORMAL
BH CV LOWER VASCULAR RIGHT MID FEMORAL COMPRESS: NORMAL
BH CV LOWER VASCULAR RIGHT MID FEMORAL PHASIC: NORMAL
BH CV LOWER VASCULAR RIGHT MID FEMORAL SPONT: NORMAL
BH CV LOWER VASCULAR RIGHT PERONEAL COMPRESS: NORMAL
BH CV LOWER VASCULAR RIGHT POPLITEAL AUGMENT: NORMAL
BH CV LOWER VASCULAR RIGHT POPLITEAL COMPRESS: NORMAL
BH CV LOWER VASCULAR RIGHT POPLITEAL PHASIC: NORMAL
BH CV LOWER VASCULAR RIGHT POPLITEAL SPONT: NORMAL
BH CV LOWER VASCULAR RIGHT POSTERIOR TIBIAL COMPRESS: NORMAL
BH CV LOWER VASCULAR RIGHT PROFUNDA FEMORAL COMPRESS: NORMAL
BH CV LOWER VASCULAR RIGHT PROXIMAL FEMORAL COMPRESS: NORMAL
BH CV LOWER VASCULAR RIGHT SAPHENOFEMORAL JUNCTION AUGMENT: NORMAL
BH CV LOWER VASCULAR RIGHT SAPHENOFEMORAL JUNCTION COMPRESS: NORMAL
BH CV LOWER VASCULAR RIGHT SAPHENOFEMORAL JUNCTION PHASIC: NORMAL
BH CV LOWER VASCULAR RIGHT SAPHENOFEMORAL JUNCTION SPONT: NORMAL

## 2018-04-24 NOTE — PROGRESS NOTES
"Subjective    Pamela Herrera is a 72 y.o. female here for:  Chief Complaint   Patient presents with   • Hypertension     Hypertension   This is a chronic problem. The current episode started more than 1 year ago. The problem is unchanged. The problem is controlled. Associated symptoms include peripheral edema (Lymphedema to right lower extremity is worsening.). Pertinent negatives include no chest pain. There are no associated agents to hypertension. Risk factors for coronary artery disease include obesity, sedentary lifestyle, stress and post-menopausal state. Current antihypertension treatment includes calcium channel blockers and beta blockers. Compliance problems include exercise.  Hypertensive end-organ damage includes kidney disease. Identifiable causes of hypertension include chronic renal disease.      Since last admission at  her lymphedema to the lower right extremity has worsened. Improved mildly with her lymphedema pump device, compression stocking being worn but not helping much. Interested in physical therapy for this. She's not had a blood clot but no calf pain. Risk factor is her recent admission.    The following portions of the patient's history were reviewed and updated as appropriate: allergies, current medications, past family history, past medical history, past social history, past surgical history and problem list.    Review of Systems   Constitutional: Positive for fatigue.   Cardiovascular: Positive for leg swelling. Negative for chest pain.   Musculoskeletal: Positive for arthralgias.       Vitals:    04/23/18 1552   BP: 124/70   Pulse: 86   Resp: 14   Temp: 97.6 °F (36.4 °C)   SpO2: 98%   Weight: 90.7 kg (200 lb)   Height: 154.9 cm (60.98\")         Objective   Physical Exam   Constitutional: She is oriented to person, place, and time. Vital signs are normal. She appears well-developed and well-nourished. She is active.  Non-toxic appearance. She has a sickly appearance. She does not " appear ill. No distress. She is obese.  HENT:   Head: Normocephalic and atraumatic.   Right Ear: Hearing normal.   Left Ear: Hearing normal.   Mouth/Throat: Mucous membranes are not dry.   Eyes: EOM and lids are normal. No scleral icterus.   Neck: Neck supple.   Pulmonary/Chest: Effort normal.   Genitourinary:   Genitourinary Comments: Urine collection bag anchored on right calf area   Musculoskeletal:        Right lower leg: She exhibits edema. She exhibits no tenderness.   Neurological: She is alert and oriented to person, place, and time. No cranial nerve deficit. Gait abnormal.   Skin: Skin is warm. She is not diaphoretic.   Psychiatric: She has a normal mood and affect. Her behavior is normal.   Nursing note and vitals reviewed.        Assessment/Plan     Problem List Items Addressed This Visit        Cardiovascular and Mediastinum    Essential hypertension    Current Assessment & Plan     Hypertension is improving with treatment.  Continue current treatment regimen.  Blood pressure will be reassessed at the next regular appointment.            Other    Lymphedema of right lower extremity - Primary    Current Assessment & Plan     Duplex has ruled out underlying clot. Continue compression stocking, home treatments.         Relevant Orders    Duplex Venous Lower Extremity - Right CAR (Completed)    Ambulatory Referral to Physical Therapy Lymphedema      Other Visit Diagnoses    None.             · Patient has been erroneously marked as diabetic. Based on the available clinical information, she does not have diabetes and should therefore be excluded from diabetic health maintenance and quality measures for the remainder of the reporting period.  ·     Return in about 6 months (around 10/23/2018) for Medicare Wellness.    Michelle Wynn MD    Please note that portions of this note may have been completed with a voice recognition program. Efforts were made to edit dictation, but occasionally words are  mistranscribed.

## 2018-04-25 NOTE — PROGRESS NOTES
I was able to get in touch with patient's daughter and she stated that the rash was gone and that she had previously saw the lab results on my chart.  She states that the nephrostomy tube output has since improved.

## 2018-04-25 NOTE — PROGRESS NOTES
Please contact patient and let her know lab results.  Her daughter also would probably like to know so if she is available you can inform her.  Her lab results appear to be improved compared to previous ones from [facility].  Please ask her how the rash is doing and see if it's went away.  See if her right nephrostomy tube improved in color and she had a lot of sediment in it was very cloudy.

## 2018-05-10 ENCOUNTER — APPOINTMENT (OUTPATIENT)
Dept: PHYSICAL THERAPY | Facility: HOSPITAL | Age: 73
End: 2018-05-10

## 2018-07-18 DIAGNOSIS — M54.32 SCIATICA OF LEFT SIDE: ICD-10-CM

## 2018-07-18 RX ORDER — GABAPENTIN 100 MG/1
CAPSULE ORAL
Qty: 90 CAPSULE | Status: CANCELLED | OUTPATIENT
Start: 2018-07-18

## 2018-07-19 DIAGNOSIS — M54.32 SCIATICA OF LEFT SIDE: ICD-10-CM

## 2018-07-19 RX ORDER — GABAPENTIN 100 MG/1
100 CAPSULE ORAL 3 TIMES DAILY PRN
Qty: 90 CAPSULE | Refills: 2 | Status: CANCELLED | OUTPATIENT
Start: 2018-07-19

## 2018-07-20 DIAGNOSIS — M54.32 SCIATICA OF LEFT SIDE: ICD-10-CM

## 2018-07-20 RX ORDER — GABAPENTIN 100 MG/1
CAPSULE ORAL
Qty: 90 CAPSULE | Refills: 2 | Status: SHIPPED | OUTPATIENT
Start: 2018-07-20 | End: 2019-01-28 | Stop reason: SDUPTHER

## 2018-07-30 RX ORDER — AMLODIPINE BESYLATE 10 MG/1
TABLET ORAL
Qty: 90 TABLET | Refills: 2 | Status: SHIPPED | OUTPATIENT
Start: 2018-07-30 | End: 2019-04-14 | Stop reason: SDUPTHER

## 2018-07-30 RX ORDER — CARVEDILOL 25 MG/1
TABLET ORAL
Qty: 180 TABLET | Refills: 2 | Status: SHIPPED | OUTPATIENT
Start: 2018-07-30 | End: 2019-05-05 | Stop reason: SDUPTHER

## 2018-08-17 ENCOUNTER — EPISODE CHANGES (OUTPATIENT)
Dept: CASE MANAGEMENT | Facility: OTHER | Age: 73
End: 2018-08-17

## 2018-10-25 ENCOUNTER — OFFICE VISIT (OUTPATIENT)
Dept: INTERNAL MEDICINE | Facility: CLINIC | Age: 73
End: 2018-10-25

## 2018-10-25 VITALS
HEIGHT: 61 IN | TEMPERATURE: 98.5 F | OXYGEN SATURATION: 99 % | HEART RATE: 76 BPM | BODY MASS INDEX: 40.22 KG/M2 | DIASTOLIC BLOOD PRESSURE: 82 MMHG | WEIGHT: 213 LBS | SYSTOLIC BLOOD PRESSURE: 150 MMHG

## 2018-10-25 DIAGNOSIS — Z93.6 NEPHROSTOMY STATUS (HCC): ICD-10-CM

## 2018-10-25 DIAGNOSIS — D63.1 ANEMIA DUE TO STAGE 5 CHRONIC KIDNEY DISEASE, NOT ON CHRONIC DIALYSIS (HCC): ICD-10-CM

## 2018-10-25 DIAGNOSIS — I77.0 AV FISTULA (HCC): ICD-10-CM

## 2018-10-25 DIAGNOSIS — I89.0 LYMPHEDEMA OF BOTH LOWER EXTREMITIES: ICD-10-CM

## 2018-10-25 DIAGNOSIS — N18.5 CKD (CHRONIC KIDNEY DISEASE) STAGE 5, GFR LESS THAN 15 ML/MIN (HCC): ICD-10-CM

## 2018-10-25 DIAGNOSIS — N18.5 ANEMIA DUE TO STAGE 5 CHRONIC KIDNEY DISEASE, NOT ON CHRONIC DIALYSIS (HCC): ICD-10-CM

## 2018-10-25 DIAGNOSIS — N28.89 HYPERTENSION SECONDARY TO OTHER RENAL DISORDERS: Primary | ICD-10-CM

## 2018-10-25 DIAGNOSIS — I15.1 HYPERTENSION SECONDARY TO OTHER RENAL DISORDERS: Primary | ICD-10-CM

## 2018-10-25 PROCEDURE — 99214 OFFICE O/P EST MOD 30 MIN: CPT | Performed by: PHYSICIAN ASSISTANT

## 2018-10-25 RX ORDER — CLINDAMYCIN HYDROCHLORIDE 150 MG/1
CAPSULE ORAL
Refills: 0 | COMMUNITY
Start: 2018-09-07 | End: 2019-01-28

## 2018-10-25 RX ORDER — PANTOPRAZOLE SODIUM 40 MG/1
40 TABLET, DELAYED RELEASE ORAL DAILY
Qty: 30 TABLET | Refills: 5 | Status: SHIPPED | OUTPATIENT
Start: 2018-10-25 | End: 2019-04-02 | Stop reason: SDUPTHER

## 2018-10-25 RX ORDER — LEVOFLOXACIN 250 MG/1
TABLET ORAL
Refills: 0 | COMMUNITY
Start: 2018-09-07 | End: 2019-01-28

## 2018-10-25 NOTE — PROGRESS NOTES
Pamela Herrera is a 73 y.o. female.     Subjective   History of Present Illness   Here today accompanied by her daughter for follow up of hypertension and other chronic conditions.     HTN: Blood pressure is elevated today but normally stays around 130/80 when checked at her many appointments with specialists. She is taking amlodipine and carvedilol as directed. Patient denies chest pain, dyspnea, orthopnea, palpitations, headaches, weakness, visual disturbances or confusion.     Lymphedema: She has chronic bilateral leg lymphedema secondary to cancer treatment. She has had increased difficulty ambulating as well as getting in and out of vehicles in the last few months but particularly troublesome in the last week. She and her daughter agree that she may benefit from physical therapy for lymphedema but do not feel that she can safely get in and out of a car to attend appointments at this time. She also has chronic bilateral knee pain due to arthritis which seems to be worsening and contributing to poor ambulatory status.     CKD stage 5: managed by a nephrologist at , Dr. Cespedes.  Last creatinine around 3.4 with GFR of 11 per labs provided on her daughter's cell phone from [facility]. She has an AV fistula in place but has not yet started hemodialysis. On labs she is noted to have anemia secondary to CKD which is also monitored by nephrology. She previously saw urology due to bilateral nephrostomy but now has nephrology managing it as well. She received a monthly prescription for Levaquin and Clindamycin to minimize potential infections.             The following portions of the patient's history were reviewed and updated as appropriate: allergies, current medications, past family history, past medical history, past social history, past surgical history and problem list.    Review of Systems    Constitutional: fatigue. Negative for appetite change, chills, fever and unexpected weight change.   Respiratory:  "Negative for cough, chest tightness, shortness of breath and wheezing.    Cardiovascular: legs swelling.  Negative for chest pain, palpitations.  Gastrointestinal: Negative for abdominal distention, abdominal pain, blood in stool, constipation, diarrhea, nausea and vomiting.   Endocrine: Negative for cold intolerance, heat intolerance, polydipsia, polyphagia and polyuria.   Musculoskeletal: bilateral knee pain.  Negative for back pain, joint swelling, myalgias, neck pain and neck stiffness.   Skin: Negative for color change, pallor, rash.  Allergic/Immunologic: Negative for environmental allergies, food allergies and immunocompromised state.   Neurological: Negative for dizziness, tremors, seizures, weakness, numbness and headaches.   Hematological: Negative for adenopathy. Does not bruise/bleed easily.   Psychiatric/Behavioral: Negative for sleep disturbances, agitation, behavioral problems, confusion, hallucinations, self-injury and suicidal ideas. The patient is not nervous/anxious.      Objective    Physical Exam  Constitutional: Appears well-developed and well-nourished.   Cardiovascular: Normal rate, regular rhythm and normal heart sounds.    Pulmonary/Chest: Effort normal and breath sounds normal. No respiratory distress.  Has no wheezes or rales. Exhibits no chest wall tenderness.   Abdominal: nephrostomy bilaterally. Soft. Bowel sounds are normal. Exhibits no distension and no mass. There is no tenderness.   Musculoskeletal: prominent lymphedema with tenderness of bilateral legs, right worse than left. Ambulates with a walker. Decreased range of motion of legs.   Neurological: Alert and oriented to person, place, and time.   Skin: Skin is warm and dry.   Psychiatric: Has a normal mood and affect. Behavior is normal. Judgment and thought content normal.       /82   Pulse 76   Temp 98.5 °F (36.9 °C)   Ht 154.9 cm (60.98\")   Wt 96.6 kg (213 lb)   SpO2 99%   BMI 40.27 kg/m²     Nursing note and " vitals reviewed.        Assessment/Plan   Pamela was seen today for follow-up and hypertension.    Diagnoses and all orders for this visit:    Hypertension, secondary to other renal disorders  Managed by nephrology, typically well controlled with amlodipine and carvedilol. Asymptomatic today. Will refrain from changing medications at this time as she has a follow up with nephrology within the next few weeks.     Lymphedema of both lower extremities  -     Ambulatory Referral to Home Health    Lymphedema of bilateral lower extremities is causing her extreme difficulty with ambulation and even more so with transferring in and out of vehicles, making attending physical therapy appointments problematic. She should benefit greatly from in home physical therapy.     Arthritis  Bilateral knees, worsening and contributing to poor ambulatory status.     CKD (chronic kidney disease) stage 5, GFR less than 15 ml/min (CMS/[facility])  AV fistula (CMS/[facility])  Anemia of chronic kidney disease, stage 5, not on dialysis  managed by a nephrologist at , Dr. Cespedes.  Last creatinine around 3.4 with GFR of 11 per labs provided on her daughter's cell phone from  portal. She has an AV fistula in place but has not yet started hemodialysis.    Nephrostomy status (CMS/[facility])  She previously saw urology due to bilateral nephrostomy but now has nephrology managing it as well. She received a monthly prescription for Levaquin and Clindamycin to minimize potential infections.       She declined influenza vaccination today.     Encouraged her to return for medicare wellness visit with Dr. Wynn in 3 months.

## 2018-12-04 ENCOUNTER — OUTSIDE FACILITY SERVICE (OUTPATIENT)
Dept: INTERNAL MEDICINE | Facility: CLINIC | Age: 73
End: 2018-12-04

## 2018-12-04 PROCEDURE — G0180 MD CERTIFICATION HHA PATIENT: HCPCS | Performed by: FAMILY MEDICINE

## 2018-12-27 ENCOUNTER — TELEPHONE (OUTPATIENT)
Dept: INTERNAL MEDICINE | Facility: CLINIC | Age: 73
End: 2018-12-27

## 2018-12-27 NOTE — TELEPHONE ENCOUNTER
Natalie from [facility] called and stated that she would like to do 4 more weeks of PT with patient and then follow up to see how patient is doing.

## 2019-01-18 ENCOUNTER — TRANSITIONAL CARE MANAGEMENT TELEPHONE ENCOUNTER (OUTPATIENT)
Dept: INTERNAL MEDICINE | Facility: CLINIC | Age: 74
End: 2019-01-18

## 2019-01-22 ENCOUNTER — OUTSIDE FACILITY SERVICE (OUTPATIENT)
Dept: INTERNAL MEDICINE | Facility: CLINIC | Age: 74
End: 2019-01-22

## 2019-01-22 ENCOUNTER — TELEPHONE (OUTPATIENT)
Dept: INTERNAL MEDICINE | Facility: CLINIC | Age: 74
End: 2019-01-22

## 2019-01-22 PROCEDURE — G0179 MD RECERTIFICATION HHA PT: HCPCS | Performed by: FAMILY MEDICINE

## 2019-01-22 NOTE — TELEPHONE ENCOUNTER
Patients daughter calling in regards to [facility] paperwork. I stated to daughter that I will call her once it is finished and faxed

## 2019-01-28 ENCOUNTER — OFFICE VISIT (OUTPATIENT)
Dept: INTERNAL MEDICINE | Facility: CLINIC | Age: 74
End: 2019-01-28

## 2019-01-28 VITALS
RESPIRATION RATE: 16 BRPM | HEART RATE: 78 BPM | HEIGHT: 61 IN | SYSTOLIC BLOOD PRESSURE: 152 MMHG | DIASTOLIC BLOOD PRESSURE: 66 MMHG | BODY MASS INDEX: 37.62 KG/M2 | OXYGEN SATURATION: 97 % | WEIGHT: 199.25 LBS

## 2019-01-28 DIAGNOSIS — N18.6 ESRD (END STAGE RENAL DISEASE) ON DIALYSIS (HCC): ICD-10-CM

## 2019-01-28 DIAGNOSIS — Z09 HOSPITAL DISCHARGE FOLLOW-UP: ICD-10-CM

## 2019-01-28 DIAGNOSIS — Z93.6 NEPHROSTOMY STATUS (HCC): ICD-10-CM

## 2019-01-28 DIAGNOSIS — I77.0 AV FISTULA (HCC): ICD-10-CM

## 2019-01-28 DIAGNOSIS — I12.0 BENIGN HYPERTENSION WITH END-STAGE RENAL DISEASE (HCC): ICD-10-CM

## 2019-01-28 DIAGNOSIS — I89.0 LYMPHEDEMA OF BOTH LOWER EXTREMITIES: ICD-10-CM

## 2019-01-28 DIAGNOSIS — E87.70 HYPERVOLEMIA ASSOCIATED WITH RENAL INSUFFICIENCY: ICD-10-CM

## 2019-01-28 DIAGNOSIS — N18.6 BENIGN HYPERTENSION WITH END-STAGE RENAL DISEASE (HCC): ICD-10-CM

## 2019-01-28 DIAGNOSIS — C54.1 ENDOMETRIAL CANCER (HCC): ICD-10-CM

## 2019-01-28 DIAGNOSIS — D63.1 ANEMIA, CHRONIC RENAL FAILURE, STAGE 5 (HCC): ICD-10-CM

## 2019-01-28 DIAGNOSIS — E66.01 CLASS 2 SEVERE OBESITY WITH SERIOUS COMORBIDITY AND BODY MASS INDEX (BMI) OF 37.0 TO 37.9 IN ADULT, UNSPECIFIED OBESITY TYPE (HCC): ICD-10-CM

## 2019-01-28 DIAGNOSIS — N28.9 HYPERVOLEMIA ASSOCIATED WITH RENAL INSUFFICIENCY: ICD-10-CM

## 2019-01-28 DIAGNOSIS — N18.5 ANEMIA, CHRONIC RENAL FAILURE, STAGE 5 (HCC): ICD-10-CM

## 2019-01-28 DIAGNOSIS — Z99.2 ESRD (END STAGE RENAL DISEASE) ON DIALYSIS (HCC): ICD-10-CM

## 2019-01-28 DIAGNOSIS — Z00.00 MEDICARE ANNUAL WELLNESS VISIT, SUBSEQUENT: Primary | ICD-10-CM

## 2019-01-28 DIAGNOSIS — M54.32 SCIATICA OF LEFT SIDE: ICD-10-CM

## 2019-01-28 DIAGNOSIS — M17.0 PRIMARY OSTEOARTHRITIS OF BOTH KNEES: ICD-10-CM

## 2019-01-28 PROBLEM — E66.812 CLASS 2 SEVERE OBESITY WITH SERIOUS COMORBIDITY IN ADULT: Status: ACTIVE | Noted: 2019-01-28

## 2019-01-28 PROCEDURE — 99495 TRANSJ CARE MGMT MOD F2F 14D: CPT | Performed by: FAMILY MEDICINE

## 2019-01-28 PROCEDURE — G0439 PPPS, SUBSEQ VISIT: HCPCS | Performed by: FAMILY MEDICINE

## 2019-01-28 RX ORDER — FUROSEMIDE 80 MG
TABLET ORAL
COMMUNITY
Start: 2019-01-17 | End: 2020-01-21

## 2019-01-28 RX ORDER — GABAPENTIN 100 MG/1
100 CAPSULE ORAL 3 TIMES DAILY PRN
Qty: 270 CAPSULE | Refills: 1 | Status: SHIPPED | OUTPATIENT
Start: 2019-01-28 | End: 2019-10-17 | Stop reason: SDUPTHER

## 2019-01-28 NOTE — PROGRESS NOTES
Transitional Care Follow Up Visit  Subjective     Pamela Herrera is a 73 y.o. female who presents for a transitional care management visit.    Within 48 business hours after discharge our office contacted her via telephone to coordinate her care and needs.      I reviewed and discussed the details of that call along with the discharge summary, hospital problems, inpatient lab results, inpatient diagnostic studies, and consultation reports with Pamela.     Current outpatient and discharge medications have been reconciled for the patient.    Date of TCM Phone Call 1/22/2019   [facility]   Date of Admission 1/11/2019   Date of Discharge 1/17/2019   Discharge Disposition Home-Health Care [facility]     Risk for Readmission (LACE) No Data Recorded    History of Present Illness   Course During Hospital Stay:  Patient was admitted to  on January 11 and discharged January 17.  Discharge diagnoses included pyelonephritis, end-stage renal disease, hypertension, anemia of chronic disease, hypervolemia.  She presented with a one-week history of worsening shortness of air and bilateral lower extremity edema.  She was declared end-stage renal disease by the renal team at  and dialysis was initiated.  She has an AV fistula in the left arm.  Her fistula infiltrated when used for dialysis and was not felt to be mature enough for continued hemodialysis.  Tunnel dialysis catheter was placed by interventional radiology and she underwent hemodialysis on Laura 15.  She had good urine output with nephrostomy tubes and had diuresis with IV Lasix.  She was discharged with 80 mg of Lasix daily and started on calcitriol 3 times a week with dialysis.  She is already followed up with dialysis in [place].  Upon admission she was noted to have costovertebral angle pain and foul-smelling discharge from nephrostomy tubes, she was treated empirically for pyelonephritis with IV Rocephin and then transitioned to Ceftin ear.     The  following portions of the patient's history were reviewed and updated as appropriate: allergies, current medications, past family history, past medical history, past social history, past surgical history and problem list.    Review of Systems   Constitutional: Positive for fatigue.   Respiratory: Negative for shortness of breath.    Genitourinary: Negative for dysuria.   All other systems reviewed and are negative.      Objective   Physical Exam   Constitutional: She is oriented to person, place, and time. Vital signs are normal. She appears well-developed and well-nourished. She is active. She has a sickly appearance. She does not appear ill.   Appears stated age. Well groomed.    HENT:   Head: Normocephalic and atraumatic. Hair is normal (dry).   Right Ear: Hearing normal.   Left Ear: Hearing normal.   Nose: Nose normal.   Eyes: EOM and lids are normal. Pupils are equal, round, and reactive to light. No scleral icterus.   Neck: Phonation normal. Neck supple.   Cardiovascular: Normal rate, regular rhythm and normal heart sounds. Exam reveals no gallop and no friction rub.   No murmur heard.  Pulmonary/Chest: Effort normal and breath sounds normal.   Genitourinary:   Genitourinary Comments: Urine bags anchored to bilateral lower legs   Musculoskeletal: She exhibits no deformity.        Right lower leg: She exhibits edema.        Left lower leg: She exhibits edema.   Neurological: She is alert and oriented to person, place, and time. She displays no tremor. No cranial nerve deficit. Gait normal.   Skin: Skin is warm. No rash noted. She is not diaphoretic. No cyanosis. There is pallor (baseline pallor). Nails show no clubbing.   Psychiatric: She has a normal mood and affect. Her speech is normal and behavior is normal. Judgment and thought content normal. Cognition and memory are normal.   Nursing note and vitals reviewed.      Assessment/Plan   Pamela was seen today for medicare wellness-subsequent, hypertension,  edema, sciatica and congestive heart failure.    Diagnoses and all orders for this visit:    Medicare annual wellness visit, subsequent    ESRD (end stage renal disease) on dialysis (CMS/HCC)    Benign hypertension with end-stage renal disease (CMS/HCC)    Anemia, chronic renal failure, stage 5 (CMS/HCC)    AV fistula (CMS/HCC)    Nephrostomy status (CMS/HCC)    Hypervolemia associated with renal insufficiency    Endometrial cancer (CMS/HCC)    Sciatica of left side  Comments:  Mainly taking at night. Can refill by call in between visits.  Orders:  -     gabapentin (NEURONTIN) 100 MG capsule; Take 1 capsule by mouth 3 (Three) Times a Day As Needed (nerve pain).    Lymphedema of both lower extremities    Primary osteoarthritis of both knees    Class 2 severe obesity with serious comorbidity and body mass index (BMI) of 37.0 to 37.9 in adult, unspecified obesity type (CMS/HCC)    Hospital discharge follow-up      · see second note from today for her Medicare wellness visit which was also completed today  · Follow up with dialysis as scheduled, good thrill noted today to her AV fistula in the left upper extremity  · Follow-up with nephrology regarding elevated blood pressure

## 2019-01-28 NOTE — PROGRESS NOTES
QUICK REFERENCE INFORMATION:  The ABCs of the Annual Wellness Visit    Subsequent Medicare Wellness Visit    HEALTH RISK ASSESSMENT    1945    Recent Hospitalizations:  Recently treated at the following:  Other: [facility].        Current Medical Providers:  Patient Care Team:  Michelle Wynn MD as PCP - General (Family Medicine)  Ok Kunz MD as Surgeon (Orthopedic Surgery)        Smoking Status:  Social History     Tobacco Use   Smoking Status Never Smoker   Smokeless Tobacco Never Used       Alcohol Consumption:  Social History     Substance and Sexual Activity   Alcohol Use No       Depression Screen:   PHQ-2/PHQ-9 Depression Screening 1/28/2019   Little interest or pleasure in doing things 1   Feeling down, depressed, or hopeless 0   Total Score 1       Health Habits and Functional and Cognitive Screening:  Functional & Cognitive Status 1/28/2019   Do you have difficulty preparing food and eating? No   Do you have difficulty bathing yourself, getting dressed or grooming yourself? No   Do you have difficulty using the toilet? No   Do you have difficulty moving around from place to place? No   Do you have trouble with steps or getting out of a bed or a chair? Yes   In the past year have you fallen or experienced a near fall? Yes   Current Diet Unhealthy Diet   Dental Exam Up to date   Eye Exam Not up to date   Exercise (times per week) 0 times per week   Current Exercise Activities Include None   Do you need help using the phone?  No   Are you deaf or do you have serious difficulty hearing?  No   Do you need help with transportation? Yes   Do you need help shopping? Yes   Do you need help preparing meals?  No   Do you need help with housework?  No   Do you need help with laundry? Yes   Do you need help taking your medications? No   Do you need help managing money? No   Do you ever drive or ride in a car without wearing a seat belt? No           Does the patient have evidence of cognitive  impairment? No    Aspirin use counseling: Does not need ASA (and currently is not on it)      Recent Lab Results:  CMP:  Lab Results   Component Value Date    GLU 91 04/16/2018    BUN 56 (H) 04/16/2018    CREATININE 3.70 (H) 04/16/2018    EGFRIFNONA 12 (L) 04/16/2018    EGFRIFAFRI 15 (L) 04/16/2018    BCR 15.1 04/16/2018     04/16/2018    K 5.0 04/16/2018    CO2 24.0 (L) 04/16/2018    CALCIUM 8.1 (L) 04/16/2018    PROTENTOTREF 5.8 (L) 04/16/2018    ALBUMIN 3.20 (L) 04/16/2018    LABGLOBREF 2.6 04/16/2018    LABIL2 1.2 04/16/2018    BILITOT 0.3 04/16/2018    ALKPHOS 62 04/16/2018    AST 15 04/16/2018    ALT 21 04/16/2018     Lipid Panel:     HbA1c:  Lab Results   Component Value Date    HGBA1C 5.3 04/17/2015       Visual Acuity:  No exam data present    Age-appropriate Screening Schedule:  Refer to the list below for future screening recommendations based on patient's age, sex and/or medical conditions. Orders for these recommended tests are listed in the plan section. The patient has been provided with a written plan.    Health Maintenance   Topic Date Due   • COLONOSCOPY  04/13/2028   • TDAP/TD VACCINES (2 - Td) 10/11/2028   • INFLUENZA VACCINE  Addressed   • PNEUMOCOCCAL VACCINES (65+ LOW/MEDIUM RISK)  Discontinued   • MAMMOGRAM  Discontinued   • ZOSTER VACCINE  Discontinued        Subjective   History of Present Illness    Pamela Herrera is a 73 y.o. female who presents for an Subsequent Wellness Visit.  She is a complex patient with many chronic issues.  She was recently diagnosed with end-stage renal disease and started on hemodialysis through a tunneled catheter during her admission at .  There is an AV fistula in the left upper extremity which has a good thrill but was infiltrated during her recent admission.  She has nephrostomy tubes bilaterally following treatment for endometrial cancer, surgical complications.  She continues to produce urine at this time.  She has chronic pain with osteoarthritis  of bilateral knees.  She has been taking hydrocodone here or there as needed for this pain and it helps.  Also has essential hypertension associated with her renal disease, blood pressure is somewhat elevated today.  On Lasix for her lymphedema of the lower extremities, it does seem to help, diuresis at  recently noted improvement.    The following portions of the patient's history were reviewed and updated as appropriate: allergies, current medications, past family history, past medical history, past social history, past surgical history and problem list.    Outpatient Medications Prior to Visit   Medication Sig Dispense Refill   • amLODIPine (NORVASC) 10 MG tablet TAKE 1 TABLET BY MOUTH DAILY 90 tablet 2   • CALCIUM PO Take  by mouth.     • carvedilol (COREG) 25 MG tablet TAKE 1 TABLET BY MOUTH TWICE A  tablet 2   • COLLAGEN-VITAMIN C PO Take  by mouth.     • furosemide (LASIX) 80 MG tablet      • HYDROcodone-acetaminophen (NORCO) 5-325 MG per tablet Take 1 tablet by mouth Every 8 (Eight) Hours As Needed for Moderate Pain . 30 tablet 0   • Misc Natural Products (OSTEO BI-FLEX ADV JOINT SHIELD) tablet Take  by mouth.     • pantoprazole (PROTONIX) 40 MG EC tablet Take 1 tablet by mouth Daily. 30 tablet 5   • Prenatal Vit-Fe Fumarate-FA (PRENATAL PO) Take  by mouth.     • Probiotic Product (PROBIOTIC-10 PO) Take  by mouth.     • sodium bicarbonate 650 MG tablet Take 1,300 mg by mouth 2 (Two) Times a Day.     • triamcinolone (KENALOG) 0.1 % cream Apply  topically 1 (One) Time Per Week. 45 g 5   • VOLTAREN 1 % gel gel      • gabapentin (NEURONTIN) 100 MG capsule TAKE 1 CAPSULE BY MOUTH 3 TIMES PER DAY AS NEEDED FOR PAIN 90 capsule 2   • fluticasone (FLONASE) 50 MCG/ACT nasal spray 1 spray into each nostril Daily. 3 bottle 3   • clindamycin (CLEOCIN) 150 MG capsule TAKE ONE CAPSULE BY MOUTH EVERY 6 HOURS  0   • levoFLOXacin (LEVAQUIN) 250 MG tablet TAKE 2 TABLETS BY MOUTH DAY BEFORE SURGERY, THEN TAKE 1 TABLET  THE DAY AFTER  0     No facility-administered medications prior to visit.        Patient Active Problem List   Diagnosis   • ESRD (end stage renal disease) on dialysis (CMS/[facility])   • Anemia, chronic renal failure, stage 5 (CMS/[facility])   • Benign hypertension with end-stage renal disease (CMS/[facility])   • Arthritis   • Endometrial cancer (CMS/[facility])   • Lymphedema of both lower extremities   • Nephrostomy status (CMS/[facility])   • Environmental and seasonal allergies   • AV fistula (CMS/[facility])   • Primary osteoarthritis of both knees   • Sciatica of left side   • Class 2 severe obesity with serious comorbidity in adult (CMS/[facility])       Advance Care Planning:  has an advance directive - a copy has been provided and is in file    Identification of Risk Factors:  Risk factors include: weight , cardiovascular risk, inactivity, chronic pain and polypharmacy.    Review of Systems   Constitutional: Positive for activity change, fatigue and unexpected weight change. Negative for fever.   Respiratory: Positive for shortness of breath (improved).    Genitourinary: Positive for difficulty urinating (chronic).   All other systems reviewed and are negative.      Compared to one year ago, the patient feels her physical health is worse.  Compared to one year ago, the patient feels her mental health is the same.    Objective     Physical Exam   Constitutional: She is oriented to person, place, and time. Vital signs are normal. She appears well-developed and well-nourished. She is active.  Non-toxic appearance. She has a sickly appearance. She does not appear ill. No distress.   Appears stated age. Well groomed.    HENT:   Head: Normocephalic and atraumatic. Hair is normal.   Right Ear: Hearing, tympanic membrane, external ear and ear canal normal.   Left Ear: Hearing, tympanic membrane, external ear and ear canal normal.   Nose: Nose normal.   Eyes: EOM and lids are normal. Pupils are equal, round, and reactive to light. No scleral icterus.   Neck:  "Phonation normal. Neck supple.   Cardiovascular: Normal rate, regular rhythm and normal heart sounds. Exam reveals no gallop and no friction rub.   No murmur heard.  Pulmonary/Chest: Effort normal and breath sounds normal.   Abdominal: Soft. Bowel sounds are normal. There is no tenderness.   Musculoskeletal: She exhibits no deformity.        Right lower leg: She exhibits edema.        Left lower leg: She exhibits edema.   Neurological: She is alert and oriented to person, place, and time. She displays no tremor. No cranial nerve deficit. Gait abnormal.   Skin: Skin is warm and dry. No rash noted. She is not diaphoretic. No cyanosis. There is pallor. Nails show no clubbing.   Psychiatric: She has a normal mood and affect. Her speech is normal and behavior is normal. Judgment and thought content normal. Cognition and memory are normal.   Nursing note and vitals reviewed.      Vitals:    01/28/19 0852   BP: 152/66   Pulse: 78   Resp: 16   SpO2: 97%   Weight: 90.4 kg (199 lb 4 oz)   Height: 154.9 cm (60.98\")   PainSc:   5   PainLoc: Knee       Patient's Body mass index is 37.67 kg/m². BMI is above normal parameters. Recommendations include: no follow-up required.      Assessment/Plan   Patient Self-Management and Personalized Health Advice  The patient has been provided with information about: weight management and prevention of cardiac or vascular disease and preventive services including:   · Counseling for cardiovascular disease risk reduction.    Visit Diagnoses:    ICD-10-CM ICD-9-CM   1. Medicare annual wellness visit, subsequent Z00.00 V70.0   2. ESRD (end stage renal disease) on dialysis (CMS/[facility]) N18.6 585.6    Z99.2 V45.11   3. Benign hypertension with end-stage renal disease (CMS/[facility]) I12.0 403.11    N18.6 585.6   4. Anemia, chronic renal failure, stage 5 (CMS/[facility]) N18.5 285.21    D63.1 585.5   5. AV fistula (CMS/[facility]) I77.0 447.0   6. Nephrostomy status (CMS/[facility]) Z93.6 V44.6   7. Hypervolemia associated with " renal insufficiency E87.70 276.69    N28.9 593.9   8. Endometrial cancer (CMS/[facility]) C54.1 182.0   9. Sciatica of left side M54.32 724.3   10. Lymphedema of both lower extremities I89.0 457.1   11. Primary osteoarthritis of both knees M17.0 715.16   12. Class 2 severe obesity with serious comorbidity and body mass index (BMI) of 37.0 to 37.9 in adult, unspecified obesity type (CMS/[facility]) E66.01 278.01    Z68.37 V85.37   13. Hospital discharge follow-up Z09 V67.59       No orders of the defined types were placed in this encounter.      Outpatient Encounter Medications as of 1/28/2019   Medication Sig Dispense Refill   • amLODIPine (NORVASC) 10 MG tablet TAKE 1 TABLET BY MOUTH DAILY 90 tablet 2   • CALCIUM PO Take  by mouth.     • carvedilol (COREG) 25 MG tablet TAKE 1 TABLET BY MOUTH TWICE A  tablet 2   • COLLAGEN-VITAMIN C PO Take  by mouth.     • furosemide (LASIX) 80 MG tablet      • gabapentin (NEURONTIN) 100 MG capsule Take 1 capsule by mouth 3 (Three) Times a Day As Needed (nerve pain). 270 capsule 1   • HYDROcodone-acetaminophen (NORCO) 5-325 MG per tablet Take 1 tablet by mouth Every 8 (Eight) Hours As Needed for Moderate Pain . 30 tablet 0   • Misc Natural Products (OSTEO BI-FLEX ADV JOINT SHIELD) tablet Take  by mouth.     • pantoprazole (PROTONIX) 40 MG EC tablet Take 1 tablet by mouth Daily. 30 tablet 5   • Prenatal Vit-Fe Fumarate-FA (PRENATAL PO) Take  by mouth.     • Probiotic Product (PROBIOTIC-10 PO) Take  by mouth.     • sodium bicarbonate 650 MG tablet Take 1,300 mg by mouth 2 (Two) Times a Day.     • triamcinolone (KENALOG) 0.1 % cream Apply  topically 1 (One) Time Per Week. 45 g 5   • VOLTAREN 1 % gel gel      • [DISCONTINUED] gabapentin (NEURONTIN) 100 MG capsule TAKE 1 CAPSULE BY MOUTH 3 TIMES PER DAY AS NEEDED FOR PAIN 90 capsule 2   • fluticasone (FLONASE) 50 MCG/ACT nasal spray 1 spray into each nostril Daily. 3 bottle 3   • [DISCONTINUED] clindamycin (CLEOCIN) 150 MG capsule TAKE ONE  CAPSULE BY MOUTH EVERY 6 HOURS  0   • [DISCONTINUED] levoFLOXacin (LEVAQUIN) 250 MG tablet TAKE 2 TABLETS BY MOUTH DAY BEFORE SURGERY, THEN TAKE 1 TABLET THE DAY AFTER  0     No facility-administered encounter medications on file as of 1/28/2019.      · Follow up with all specialists as scheduled  · Reviewed  admission notes, see other note from today for hospital follow-up    Reviewed use of high risk medication in the elderly: yes  Reviewed for potential of harmful drug interactions in the elderly: yes    Follow Up:  Return in about 1 year (around 1/29/2020) for Medicare Wellness.     An After Visit Summary and PPPS with all of these plans were given to the patient.

## 2019-04-02 RX ORDER — PANTOPRAZOLE SODIUM 40 MG/1
TABLET, DELAYED RELEASE ORAL
Qty: 30 TABLET | Refills: 5 | Status: SHIPPED | OUTPATIENT
Start: 2019-04-02 | End: 2019-09-30 | Stop reason: SDUPTHER

## 2019-04-15 RX ORDER — AMLODIPINE BESYLATE 10 MG/1
TABLET ORAL
Qty: 90 TABLET | Refills: 2 | Status: SHIPPED | OUTPATIENT
Start: 2019-04-15 | End: 2020-01-08

## 2019-05-03 NOTE — TELEPHONE ENCOUNTER
----- Message from Cielo Sheppard sent at 3/31/2017  4:25 PM EDT -----  PATIENT IS WANTING TO TRANSFER CARE TO DR VAN. IS THIS OK?   [Takes medication as prescribed] : takes [None] : Patient does not have any barriers to medication adherence

## 2019-05-05 RX ORDER — CARVEDILOL 25 MG/1
TABLET ORAL
Qty: 180 TABLET | Refills: 3 | Status: SHIPPED | OUTPATIENT
Start: 2019-05-05 | End: 2020-11-23

## 2019-05-08 NOTE — TELEPHONE ENCOUNTER
Patient called to get a refill of her prescription for Voltaren.  She was also wondering if [facility] can fill it or does it have to go through [facility]?  Please call to update.  Thank you.

## 2019-08-07 ENCOUNTER — OFFICE VISIT (OUTPATIENT)
Dept: INTERNAL MEDICINE | Facility: CLINIC | Age: 74
End: 2019-08-07

## 2019-08-07 VITALS
SYSTOLIC BLOOD PRESSURE: 124 MMHG | WEIGHT: 188.13 LBS | DIASTOLIC BLOOD PRESSURE: 72 MMHG | HEIGHT: 61 IN | OXYGEN SATURATION: 97 % | HEART RATE: 77 BPM | RESPIRATION RATE: 16 BRPM | BODY MASS INDEX: 35.52 KG/M2 | TEMPERATURE: 97.7 F

## 2019-08-07 DIAGNOSIS — D48.9 NEOPLASM, UNCERTAIN WHETHER BENIGN OR MALIGNANT: Primary | ICD-10-CM

## 2019-08-07 DIAGNOSIS — I77.0 AV FISTULA (HCC): ICD-10-CM

## 2019-08-07 PROCEDURE — 11301 SHAVE SKIN LESION 0.6-1.0 CM: CPT | Performed by: FAMILY MEDICINE

## 2019-08-07 PROCEDURE — 99213 OFFICE O/P EST LOW 20 MIN: CPT | Performed by: FAMILY MEDICINE

## 2019-08-07 NOTE — PROGRESS NOTES
"Subjective    Pamela Herrera is a 74 y.o. female here for:    Chief Complaint   Patient presents with   • Abrasion     bilateral arm warts that she would like to have looked at        History of Present Illness     Two skin lesions growing over time, she thinks they may be seed warts. One on each arm, one on left forearm is worst. Itchy to a degree. Has av fistula left arm but was told she could get lesion removed with local anesthesia.    The following portions of the patient's history were reviewed and updated as appropriate: allergies, current medications, past family history, past medical history, past social history, past surgical history and problem list.    Review of Systems   Constitutional: Positive for fatigue.   Musculoskeletal: Positive for arthralgias.   Skin: Positive for dry skin and skin lesions.   Neurological: Positive for weakness.       Vitals:    08/07/19 0917   BP: 124/72   Pulse: 77   Resp: 16   Temp: 97.7 °F (36.5 °C)   TempSrc: Temporal   SpO2: 97%   Weight: 85.3 kg (188 lb 2 oz)   Height: 154.9 cm (60.98\")     Patient's Body mass index is 35.56 kg/m². BMI is above normal parameters. Recommendations include: educational material.        Objective     Physical Exam   Constitutional: She is oriented to person, place, and time. Vital signs are normal. She appears well-developed and well-nourished. She is active.  Non-toxic appearance. She does not appear ill. No distress. She is obese.  HENT:   Head: Normocephalic and atraumatic.   Right Ear: Hearing normal.   Left Ear: Hearing normal.   Mouth/Throat: Mucous membranes are not dry.   Eyes: EOM are normal. No scleral icterus.   Neck: Phonation normal. Neck supple.   Pulmonary/Chest: Effort normal.   Neurological: She is alert and oriented to person, place, and time. She displays no tremor. No cranial nerve deficit.   In wheelchair   Skin: Skin is warm. Bruising (mid upper left arm and antecubital fossa) noted. No rash noted. She is not " diaphoretic. No pallor (baseline pallor).        Left forearm lesion 10 mm x 7 mm  Right forearm lesion 7 mm x 6 mm   Psychiatric: She has a normal mood and affect. Her speech is normal and behavior is normal. Judgment and thought content normal. Cognition and memory are normal.   Nursing note and vitals reviewed.    Biopsy  Date/Time: 8/7/2019 9:30 AM  Performed by: Michelle Wynn MD  Authorized by: Michelle Wynn MD     Procedure Details - Skin Biopsy:     Body area: upper extremity    Upper extremity location: R lower arm    Initial size (mm): 7    Final defect size (mm): 8    Malignancy: malignancy unknown      Destruction method: shave biopsy      Comments:   Local anesthesia with lidocaine 1% + epinephrine 1 ml. EBL minimal. Bandage applied after hemostasis achieved. Signs of secondary infection discussed, follow up for any issues. Lesion sent for pathology.    Biopsy  Date/Time: 8/7/2019 9:30 AM  Performed by: Michelle Wynn MD  Authorized by: Michelle Wynn MD     Procedure Details - Skin Biopsy:     Body area: upper extremity    Upper extremity location: L lower arm    Initial size (mm): 10    Final defect size (mm): 11    Malignancy: malignancy unknown      Destruction method: shave biopsy      Comments:   Local anesthesia with lidocaine 1% + epinephrine 1 ml. EBL minimal. Bandage applied after hemostasis achieved. Signs of secondary infection discussed, follow up for any issues. Lesion sent for pathology.        Assessment/Plan     Problem List Items Addressed This Visit        Cardiovascular and Mediastinum    AV fistula (CMS/[facility])    Overview     Left upper extremity.           Other Visit Diagnoses     Neoplasm, uncertain whether benign or malignant    -  Primary    Relevant Orders    Biopsy    Biopsy            Michelle Wynn MD

## 2019-09-30 RX ORDER — PANTOPRAZOLE SODIUM 40 MG/1
TABLET, DELAYED RELEASE ORAL
Qty: 90 TABLET | Refills: 1 | Status: SHIPPED | OUTPATIENT
Start: 2019-09-30 | End: 2020-01-21

## 2019-10-14 ENCOUNTER — OFFICE VISIT (OUTPATIENT)
Dept: INTERNAL MEDICINE | Facility: CLINIC | Age: 74
End: 2019-10-14

## 2019-10-14 VITALS
HEIGHT: 61 IN | TEMPERATURE: 97.3 F | WEIGHT: 185 LBS | OXYGEN SATURATION: 96 % | HEART RATE: 94 BPM | DIASTOLIC BLOOD PRESSURE: 80 MMHG | SYSTOLIC BLOOD PRESSURE: 145 MMHG | BODY MASS INDEX: 34.93 KG/M2

## 2019-10-14 DIAGNOSIS — H61.23 EXCESSIVE CERUMEN IN BOTH EAR CANALS: Primary | ICD-10-CM

## 2019-10-14 PROCEDURE — 99213 OFFICE O/P EST LOW 20 MIN: CPT | Performed by: NURSE PRACTITIONER

## 2019-10-14 PROCEDURE — 69210 REMOVE IMPACTED EAR WAX UNI: CPT | Performed by: NURSE PRACTITIONER

## 2019-10-14 NOTE — PROGRESS NOTES
"Date: 10/14/2019    Name: Pamela Herrera  : 1945    Chief Complaint:   Chief Complaint   Patient presents with   • Ear Fullness     rt ear       HPI: Patient presents to clinic today with right ear fullness and decreased hearing in right ear for a week.  She has had no drainage from ear, rhinorrhea, sore throat, headache, dizziness, cough.  She does have postnasal drip.  Has noticed she is unable to \"pull wax\" out of her ear, which she is normally able to do.      History:  The following portions of the patient's history were reviewed and updated as appropriate: allergies, current medications, past medical history, family history, surgical history, social history and problem list.     ROS:  Review of Systems   Constitutional: Negative for appetite change, chills and fever.   HENT: Negative for sinus pressure, sneezing and tinnitus.        VS:  Vitals:    10/14/19 1640   BP: 145/80   Pulse: 94   Temp: 97.3 °F (36.3 °C)   TempSrc: Temporal   SpO2: 96%   Weight: 83.9 kg (185 lb)   Height: 154.9 cm (60.98\")     Body mass index is 34.98 kg/m².    PE:  Physical Exam   Constitutional: She is oriented to person, place, and time. She appears well-developed and well-nourished. No distress.   HENT:   Head: Normocephalic.   Right Ear: External ear normal. cerumen impaction is present.  Left Ear: Tympanic membrane and external ear normal.   Nose: Mucosal edema present. No sinus tenderness.   Mouth/Throat: Uvula is midline and mucous membranes are normal. Posterior oropharyngeal erythema present.   Excess soft yellow cerumen in left ear canal, able to visualize TM; unable to visualize right TM due to cerumen impaction   Eyes: Conjunctivae and EOM are normal. Pupils are equal, round, and reactive to light.   Neck: Neck supple.   Cardiovascular: Normal rate, regular rhythm, normal heart sounds and intact distal pulses.   Pulmonary/Chest: Effort normal and breath sounds normal.   Lymphadenopathy:     She has no cervical " adenopathy.   Neurological: She is alert and oriented to person, place, and time.   Skin:   Chest dialysis port covered with dressing, left AV fistula positive bruit and thrill   Ear Cerumen Removal  Date/Time: 10/14/2019 5:00 PM  Performed by: Radha Zheng APRN  Authorized by: Radha Zheng APRN   Consent: Verbal consent obtained.  Consent given by: patient  Patient understanding: patient states understanding of the procedure being performed  Required items: required blood products, implants, devices, and special equipment available  Patient identity confirmed: verbally with patient    Anesthesia:  Local Anesthetic: none  Location details: left ear and right ear  Patient tolerance: Patient tolerated the procedure well with no immediate complications  Comments: Cerumen impaction not dislodged by irrigation or use of instrumentation in right ear.  Left ear clear of cerumen at this time.  Procedure type: instrumentation and irrigation   Sedation:  Patient sedated: no            Assessment/Plan:  Pamela was seen today for ear fullness.    Diagnoses and all orders for this visit:    Excessive cerumen in both ear canals  -     carbamide peroxide (DEBROX) 6.5 % otic solution; Administer 5 drops into both ears 2 (Two) Times a Day for 2 days.  -     Ear Cerumen Removal  - Advised to discontinue use of cotton tipped applicators in ears      Return in about 2 days (around 10/16/2019) for Next scheduled follow up.

## 2019-10-16 ENCOUNTER — OFFICE VISIT (OUTPATIENT)
Dept: INTERNAL MEDICINE | Facility: CLINIC | Age: 74
End: 2019-10-16

## 2019-10-16 VITALS
HEART RATE: 84 BPM | SYSTOLIC BLOOD PRESSURE: 131 MMHG | OXYGEN SATURATION: 95 % | HEIGHT: 61 IN | TEMPERATURE: 97.6 F | WEIGHT: 185 LBS | DIASTOLIC BLOOD PRESSURE: 76 MMHG | BODY MASS INDEX: 34.93 KG/M2

## 2019-10-16 DIAGNOSIS — H61.23 BILATERAL IMPACTED CERUMEN: ICD-10-CM

## 2019-10-16 DIAGNOSIS — H65.03 NON-RECURRENT ACUTE SEROUS OTITIS MEDIA OF BOTH EARS: Primary | ICD-10-CM

## 2019-10-16 PROCEDURE — 99213 OFFICE O/P EST LOW 20 MIN: CPT | Performed by: NURSE PRACTITIONER

## 2019-10-16 PROCEDURE — 69209 REMOVE IMPACTED EAR WAX UNI: CPT | Performed by: NURSE PRACTITIONER

## 2019-10-16 RX ORDER — FLUTICASONE PROPIONATE 50 MCG
1 SPRAY, SUSPENSION (ML) NASAL DAILY
Qty: 3 BOTTLE | Refills: 3 | Status: SHIPPED | OUTPATIENT
Start: 2019-10-16

## 2019-10-16 NOTE — PROGRESS NOTES
"Date: 10/16/2019    Name: Pamela Herrera  : 1945    Chief Complaint:   Chief Complaint   Patient presents with   • Follow-up       HPI:  Pamela Herrera is a 74 y.o. female presents for follow up of cerumen impaction.  She has been used debrox ear drops twice since yesterday, has not noted any drainage or earwax from ears.  Right ear pain persists, rates it as 4/10 at this time. She continues to feel like she can't hear out of right ear.  Denies fever, chills, dizziness, sore throat, headache.      History:  The following portions of the patient's history were reviewed and updated as appropriate: allergies, current medications, past medical history, family history, surgical history, social history and problem list.      ROS:  Review of Systems   Constitutional: Negative for appetite change.   HENT: Positive for congestion. Negative for sneezing and tinnitus.        VS:  Vitals:    10/16/19 1012   BP: 131/76   Pulse: 84   Temp: 97.6 °F (36.4 °C)   TempSrc: Temporal   SpO2: 95%   Weight: 83.9 kg (185 lb)   Height: 154.9 cm (60.98\")       PE:  Physical Exam   Constitutional: She is oriented to person, place, and time. She appears well-developed and well-nourished. No distress.   HENT:   Head: Normocephalic.   Right Ear: External ear normal.   Left Ear: External ear and ear canal normal. A middle ear effusion is present.   Nose: Mucosal edema present. No sinus tenderness.   Mouth/Throat: Uvula is midline and oropharynx is clear and moist.   Prior to ear irrigation, unable to visualize right TM, excess soft yellow cerumen; after irrigation, able to visualize right TM, some cerumen remains, was not irrigated or removed with instrumentation due to patient discomfort.   Cardiovascular: Normal rate, regular rhythm, normal heart sounds and intact distal pulses.   Pulmonary/Chest: Effort normal and breath sounds normal.   Neurological: She is alert and oriented to person, place, and time.   Ear Cerumen " Removal  Date/Time: 10/16/2019 2:50 PM  Performed by: Radha Zheng APRN  Authorized by: Radha Zheng APRN   Consent: Verbal consent obtained.  Risks and benefits: risks, benefits and alternatives were discussed  Consent given by: patient  Patient understanding: patient states understanding of the procedure being performed  Patient identity confirmed: verbally with patient    Anesthesia:  Local Anesthetic: none  Ceruminolytics applied: Ceruminolytics applied prior to the procedure. (per patient prior to appointment)  Location details: left ear and right ear  Comments: Unable to complete irrigation of right ear due to patient discomfort; no dizziness, pain following procedure.  Procedure type: irrigation   Sedation:  Patient sedated: no            Assessment/Plan:  Pamela was seen today for follow-up.    Diagnoses and all orders for this visit:    Non-recurrent acute serous otitis media of both ears  -     fluticasone (FLONASE) 50 MCG/ACT nasal spray; 1 spray into the nostril(s) as directed by provider Daily  Bilateral impacted cerumen        - Irrigation of bilateral ears        - Continue using debrox drops 1-2 x week for excess cerumen prn    Return if symptoms worsen or fail to improve.

## 2019-10-17 DIAGNOSIS — M54.32 SCIATICA OF LEFT SIDE: ICD-10-CM

## 2019-10-17 RX ORDER — GABAPENTIN 100 MG/1
100 CAPSULE ORAL 3 TIMES DAILY PRN
Qty: 270 CAPSULE | Refills: 2 | Status: SHIPPED | OUTPATIENT
Start: 2019-10-17 | End: 2020-08-28

## 2019-10-28 ENCOUNTER — TELEPHONE (OUTPATIENT)
Dept: INTERNAL MEDICINE | Facility: CLINIC | Age: 74
End: 2019-10-28

## 2019-10-28 DIAGNOSIS — H61.23 BILATERAL IMPACTED CERUMEN: Primary | ICD-10-CM

## 2019-10-28 DIAGNOSIS — H61.23 EXCESSIVE CERUMEN IN BOTH EAR CANALS: ICD-10-CM

## 2019-10-28 DIAGNOSIS — H65.03 NON-RECURRENT ACUTE SEROUS OTITIS MEDIA OF BOTH EARS: ICD-10-CM

## 2019-10-28 NOTE — TELEPHONE ENCOUNTER
Patient called and states she is still having ear trouble. She has had it cleaned out twice now with no relief. She would like to get a referral to see ent.

## 2019-12-20 ENCOUNTER — TELEPHONE (OUTPATIENT)
Dept: INTERNAL MEDICINE | Facility: CLINIC | Age: 74
End: 2019-12-20

## 2019-12-20 NOTE — TELEPHONE ENCOUNTER
Bianca Becker called in stating that patient FMLA paper work expires at the end of the month and needs to be filled out. She is wanting to fax it over today to have filled out and completed.

## 2020-01-08 ENCOUNTER — TRANSCRIBE ORDERS (OUTPATIENT)
Dept: ADMINISTRATIVE | Facility: HOSPITAL | Age: 75
End: 2020-01-08

## 2020-01-08 DIAGNOSIS — N18.4 CHRONIC KIDNEY DISEASE, STAGE IV (SEVERE) (HCC): Primary | ICD-10-CM

## 2020-01-08 RX ORDER — AMLODIPINE BESYLATE 10 MG/1
TABLET ORAL
Qty: 90 TABLET | Refills: 2 | Status: SHIPPED | OUTPATIENT
Start: 2020-01-08 | End: 2021-02-26 | Stop reason: SDUPTHER

## 2020-01-21 ENCOUNTER — HOSPITAL ENCOUNTER (OUTPATIENT)
Dept: CARDIOLOGY | Facility: HOSPITAL | Age: 75
Discharge: HOME | End: 2020-01-21
Admitting: INTERNAL MEDICINE

## 2020-01-21 VITALS
WEIGHT: 176.37 LBS | RESPIRATION RATE: 18 BRPM | DIASTOLIC BLOOD PRESSURE: 52 MMHG | HEIGHT: 62 IN | BODY MASS INDEX: 32.46 KG/M2 | TEMPERATURE: 97.9 F | SYSTOLIC BLOOD PRESSURE: 127 MMHG | HEART RATE: 64 BPM | OXYGEN SATURATION: 95 %

## 2020-01-21 DIAGNOSIS — N18.4 CHRONIC KIDNEY DISEASE, STAGE IV (SEVERE) (HCC): ICD-10-CM

## 2020-01-21 RX ORDER — SENNOSIDES 8.6 MG
650 CAPSULE ORAL EVERY 8 HOURS PRN
COMMUNITY

## 2020-01-21 NOTE — DISCHARGE INSTR - ACTIVITY
Return to usual activities.  Leave pressure dressing in place for 24 hours, have dialysis remove tomorrow.

## 2020-01-22 NOTE — POST-PROCEDURE NOTE
Validation General Procedure      Patient was recognized and procedure were discussed with the patient.  Under aseptic technique and local anesthesia right side was prepared.  Incision was made to free the barrier in the tunnel and catheter was removed without any difficulty.  1 sutures were applied along with some Steri-Strips and pressure dressing was done.  Patient tolerated the procedure well, there was no complications.

## 2020-01-22 NOTE — H&P
[facility]  HISTORY AND PHYSICAL    Primary Care Physician: Michelle Wynn MD    Subjective     Chief Complaint:  Has a functioning fistula needs tunneled dialysis catheter removal.    History of Present Illness:   Patient with long-standing history of diabetes and hypertension who had a clotted access get a tunneled catheter placed. He has new access placed which is working fine has been used for about 2 weeks.  At this point it has been decided to remove the tunneled dialysis catheter that was placed temporarily.  The patient denies having any fevers chills chest pain shortness of breath no nausea vomiting.  Patient was recently seen during dialysis and has been doing fairly well.      Review of Systems   Otherwise complete ROS performed and negative except as mentioned in the HPI.    Past Medical History:   Past Medical History:   Diagnosis Date   • C. difficile colitis    • Chronic heart failure (CMS/HCC)    • Diverticula of colon    • Endometrial cancer (CMS/HCC)     per [facility] notes stage IA, recurrent   • ESRD (end stage renal disease) (CMS/HCC)    • Lymphedema of both lower extremities    • Obesity    • S/P radiation therapy        Past Surgical History:  Past Surgical History:   Procedure Laterality Date   • HERNIA REPAIR     • HYSTERECTOMY     • KIDNEY SURGERY Bilateral     stents   • OTHER SURGICAL HISTORY      replacement of nephrostomy tube   • PERCUTANEOUS NEPHROSTOMY Bilateral    • TUNNELED VENOUS CATHETER PLACEMENT         Family History: family history includes Cancer in an other family member; Heart attack in an other family member.    Social History:  reports that she has never smoked. She has never used smokeless tobacco. She reports that she does not drink alcohol or use drugs.    Medications:  Current Outpatient Medications on File Prior to Encounter   Medication Sig Dispense Refill   • acetaminophen (TYLENOL) 650 MG 8 hr tablet Take 650 mg by mouth Every 8  "(Eight) Hours As Needed for Mild Pain .     • amLODIPine (NORVASC) 10 MG tablet TAKE 1 TABLET BY MOUTH EVERY DAY 90 tablet 2   • CALCIUM PO Take  by mouth.     • carvedilol (COREG) 25 MG tablet TAKE 1 TABLET BY MOUTH TWICE A  tablet 3   • COLLAGEN-VITAMIN C PO Take  by mouth.     • gabapentin (NEURONTIN) 100 MG capsule TAKE 1 CAPSULE BY MOUTH 3 (THREE) TIMES A DAY AS NEEDED (NERVE PAIN). 270 capsule 2   • Misc Natural Products (OSTEO BI-FLEX ADV JOINT SHIELD) tablet Take  by mouth.     • Prenatal Vit-Fe Fumarate-FA (PRENATAL PO) Take  by mouth.     • Probiotic Product (PROBIOTIC-10 PO) Take  by mouth.     • VOLTAREN 1 % gel gel Apply  topically to the appropriate area as directed 3 (Three) Times a Day. 300 g 3   • fluticasone (FLONASE) 50 MCG/ACT nasal spray 1 spray into the nostril(s) as directed by provider Daily. 3 bottle 3   • HYDROcodone-acetaminophen (NORCO) 5-325 MG per tablet Take 1 tablet by mouth Every 8 (Eight) Hours As Needed for Moderate Pain . 30 tablet 0   • triamcinolone (KENALOG) 0.1 % cream Apply  topically 1 (One) Time Per Week. 45 g 5   • [DISCONTINUED] furosemide (LASIX) 80 MG tablet      • [DISCONTINUED] pantoprazole (PROTONIX) 40 MG EC tablet TAKE 1 TABLET BY MOUTH EVERY DAY 90 tablet 1   • [DISCONTINUED] sodium bicarbonate 650 MG tablet Take 1,300 mg by mouth 2 (Two) Times a Day.       No current facility-administered medications on file prior to encounter.      Reviewed by me    Allergies:  Allergies   Allergen Reactions   • Latex    • Metronidazole    • Penicillins    • Procaine Other (See Comments)     Novacaine, passes out         Objective     Physical Exam:  Vital Signs: /52 (BP Location: Right arm, Patient Position: Sitting)   Pulse 64   Temp 97.9 °F (36.6 °C) (Temporal)   Resp 18   Ht 157.5 cm (62\")   Wt 80 kg (176 lb 5.9 oz)   SpO2 95%   BMI 32.26 kg/m²      General Appearance: alert, oriented x 3, no acute distress,   Skin: warm and dry  HEENT: pupils round and " reactive to light, oral mucosa normal,   Neck: supple, no JVD, trachea midline  Lungs: CTA, unlabored breathing effort  Heart: RRR, normal S1 and S2, no S3, no rub  Abdomen: soft, non-tender, no palpable bladder, present bowel sounds to auscultation  Extremities: no edema, cyanosis or clubbing  Neuro: normal speech and mental status          Results Reviewed:              Assessment / Plan     Assessment/Problem List:     ESRD (end stage renal disease) on dialysis (CMS/[facility])          Plan:    Tunneled dialysis catheter will be removed, please see the procedure note.  Keep the dressing on for 24 hours.    The stitches will be removed by the dialysis nurse at the dialysis clinic in 1 week.    Follow-up with me during dialysis at the dialysis clinic             Nikko Morel MD, CYNTHIA 01/21/20 10:16 PM

## 2020-02-03 ENCOUNTER — OFFICE VISIT (OUTPATIENT)
Dept: INTERNAL MEDICINE | Facility: CLINIC | Age: 75
End: 2020-02-03

## 2020-02-03 ENCOUNTER — TELEPHONE (OUTPATIENT)
Dept: INTERNAL MEDICINE | Facility: CLINIC | Age: 75
End: 2020-02-03

## 2020-02-03 VITALS
RESPIRATION RATE: 18 BRPM | BODY MASS INDEX: 33.03 KG/M2 | SYSTOLIC BLOOD PRESSURE: 118 MMHG | DIASTOLIC BLOOD PRESSURE: 60 MMHG | TEMPERATURE: 97.1 F | WEIGHT: 179.5 LBS | HEIGHT: 62 IN | OXYGEN SATURATION: 97 % | HEART RATE: 80 BPM

## 2020-02-03 DIAGNOSIS — N18.6 BENIGN HYPERTENSION WITH END-STAGE RENAL DISEASE (HCC): ICD-10-CM

## 2020-02-03 DIAGNOSIS — E66.9 CLASS 1 OBESITY WITH SERIOUS COMORBIDITY AND BODY MASS INDEX (BMI) OF 32.0 TO 32.9 IN ADULT, UNSPECIFIED OBESITY TYPE: ICD-10-CM

## 2020-02-03 DIAGNOSIS — Z28.21 INFLUENZA VACCINATION DECLINED: ICD-10-CM

## 2020-02-03 DIAGNOSIS — I12.0 BENIGN HYPERTENSION WITH END-STAGE RENAL DISEASE (HCC): ICD-10-CM

## 2020-02-03 DIAGNOSIS — Z93.6 NEPHROSTOMY STATUS (HCC): ICD-10-CM

## 2020-02-03 DIAGNOSIS — D63.1 ANEMIA, CHRONIC RENAL FAILURE, STAGE 5 (HCC): ICD-10-CM

## 2020-02-03 DIAGNOSIS — Z28.21 PNEUMOCOCCAL VACCINATION DECLINED: ICD-10-CM

## 2020-02-03 DIAGNOSIS — Z99.2 ESRD (END STAGE RENAL DISEASE) ON DIALYSIS (HCC): ICD-10-CM

## 2020-02-03 DIAGNOSIS — C54.1 ENDOMETRIAL CANCER (HCC): ICD-10-CM

## 2020-02-03 DIAGNOSIS — I77.0 AV FISTULA (HCC): ICD-10-CM

## 2020-02-03 DIAGNOSIS — Z00.00 MEDICARE ANNUAL WELLNESS VISIT, SUBSEQUENT: Primary | ICD-10-CM

## 2020-02-03 DIAGNOSIS — N18.5 ANEMIA, CHRONIC RENAL FAILURE, STAGE 5 (HCC): ICD-10-CM

## 2020-02-03 DIAGNOSIS — M17.0 PRIMARY OSTEOARTHRITIS OF BOTH KNEES: ICD-10-CM

## 2020-02-03 DIAGNOSIS — N18.6 ESRD (END STAGE RENAL DISEASE) ON DIALYSIS (HCC): ICD-10-CM

## 2020-02-03 PROBLEM — E66.811 CLASS 1 OBESITY WITH SERIOUS COMORBIDITY AND BODY MASS INDEX (BMI) OF 32.0 TO 32.9 IN ADULT: Status: ACTIVE | Noted: 2019-01-28

## 2020-02-03 PROCEDURE — G0439 PPPS, SUBSEQ VISIT: HCPCS | Performed by: FAMILY MEDICINE

## 2020-02-03 NOTE — PROGRESS NOTES
The ABCs of the Annual Wellness Visit  Subsequent Medicare Wellness Visit    Chief Complaint   Patient presents with   • Medicare Wellness-subsequent     Physical       Subjective   History of Present Illness:  Pamela Herrera is a 74 y.o. female who presents for a Subsequent Medicare Wellness Visit.    Got dialysis catheter out as AV fistula is now working.     Having some vaginal bleeding so CT scan is set up. History of endometrial cancer which is what led to all of her issues with ureters, etc. Will be five years soon. Followed by gyn/onc at .    Followed by nephrology due to end stage renal disease. Urology due to nephrostomy tubes bilaterally.     Knee pain bilateral is chronic, osteoarthritis. Voltaren helps, running into cost issues.    Blood pressure controlled current medicines.     HEALTH RISK ASSESSMENT    Recent Hospitalizations:  No hospitalization(s) within the last year.    Current Medical Providers:  Patient Care Team:  iMchelle Wynn MD as PCP - General (Family Medicine)  Junior Albright MD as PCP - [facility]  Ok Kunz MD as Surgeon (Orthopedic Surgery)  Nikko Morel MD, CYNTHIA as Consulting Physician (Nephrology)  Yomaira Santiago MD as Consulting Physician (Gynecologic Oncology)  Stefany Allen MD as Consulting Physician (Urology)    Smoking Status:  Social History     Tobacco Use   Smoking Status Never Smoker   Smokeless Tobacco Never Used       Alcohol Consumption:  Social History     Substance and Sexual Activity   Alcohol Use No       Depression Screen:   PHQ-2/PHQ-9 Depression Screening 2/3/2020   Little interest or pleasure in doing things 0   Feeling down, depressed, or hopeless 0   Total Score 0       Fall Risk Screen:  STEADI Fall Risk Assessment was completed, and patient is at LOW risk for falls.Assessment completed on:2/3/2020    Health Habits and Functional and Cognitive Screening:  Functional & Cognitive Status 2/3/2020   Do you have  difficulty preparing food and eating? No   Do you have difficulty bathing yourself, getting dressed or grooming yourself? No   Do you have difficulty using the toilet? No   Do you have difficulty moving around from place to place? No   Do you have trouble with steps or getting out of a bed or a chair? Yes   Current Diet Well Balanced Diet   Dental Exam Up to date   Eye Exam Up to date   Exercise (times per week) 0 times per week   Current Exercise Activities Include Walking   Do you need help using the phone?  No   Are you deaf or do you have serious difficulty hearing?  No   Do you need help with transportation? No   Do you need help shopping? No   Do you need help preparing meals?  No   Do you need help with housework?  No   Do you need help with laundry? No   Do you need help taking your medications? No   Do you need help managing money? No   Do you ever drive or ride in a car without wearing a seat belt? No   Have you felt unusual stress, anger or loneliness in the last month? No   Who do you live with? Child   If you need help, do you have trouble finding someone available to you? No   Have you been bothered in the last four weeks by sexual problems? No   Do you have difficulty concentrating, remembering or making decisions? No         Does the patient have evidence of cognitive impairment? possible per daughter.     Asprin use counseling:Does not need ASA (and currently is not on it)    Age-appropriate Screening Schedule:  Refer to the list below for future screening recommendations based on patient's age, sex and/or medical conditions. Orders for these recommended tests are listed in the plan section. The patient has been provided with a written plan.    Health Maintenance   Topic Date Due   • PNEUMOCOCCAL VACCINE (65+ HIGH RISK) (1 of 2 - PCV13) 02/03/2021 (Originally 6/28/2008)   • COLONOSCOPY  04/13/2028   • TDAP/TD VACCINES (2 - Td) 10/11/2028   • INFLUENZA VACCINE  Addressed   • MAMMOGRAM  Discontinued    • ZOSTER VACCINE  Discontinued          The following portions of the patient's history were reviewed and updated as appropriate: allergies, current medications, past family history, past medical history, past social history, past surgical history and problem list.    Outpatient Medications Prior to Visit   Medication Sig Dispense Refill   • acetaminophen (TYLENOL) 650 MG 8 hr tablet Take 650 mg by mouth Every 8 (Eight) Hours As Needed for Mild Pain .     • amLODIPine (NORVASC) 10 MG tablet TAKE 1 TABLET BY MOUTH EVERY DAY 90 tablet 2   • CALCIUM PO Take  by mouth.     • carvedilol (COREG) 25 MG tablet TAKE 1 TABLET BY MOUTH TWICE A  tablet 3   • COLLAGEN-VITAMIN C PO Take  by mouth.     • fluticasone (FLONASE) 50 MCG/ACT nasal spray 1 spray into the nostril(s) as directed by provider Daily. 3 bottle 3   • gabapentin (NEURONTIN) 100 MG capsule TAKE 1 CAPSULE BY MOUTH 3 (THREE) TIMES A DAY AS NEEDED (NERVE PAIN). 270 capsule 2   • HYDROcodone-acetaminophen (NORCO) 5-325 MG per tablet Take 1 tablet by mouth Every 8 (Eight) Hours As Needed for Moderate Pain . 30 tablet 0   • Misc Natural Products (OSTEO BI-FLEX ADV JOINT SHIELD) tablet Take  by mouth.     • Prenatal Vit-Fe Fumarate-FA (PRENATAL PO) Take  by mouth.     • Probiotic Product (PROBIOTIC-10 PO) Take  by mouth.     • triamcinolone (KENALOG) 0.1 % cream Apply  topically 1 (One) Time Per Week. 45 g 5   • betamethasone valerate (VALISONE) 0.1 % cream betamethasone valerate 0.1 % topical cream   APPLY A THIN LAYER TO THE AFFECTED AREA(S) BY TOPICAL ROUTE ONCE DAILY X 7 DAYS THEN ONCE WEEKLY PRN     • VOLTAREN 1 % gel gel Apply  topically to the appropriate area as directed 3 (Three) Times a Day. 300 g 3     No facility-administered medications prior to visit.        Patient Active Problem List   Diagnosis   • ESRD (end stage renal disease) on dialysis (CMS/[facility])   • Anemia, chronic renal failure, stage 5 (CMS/[facility])   • Benign hypertension with end-stage  "renal disease (CMS/HCC)   • Arthritis   • Endometrial cancer (CMS/HCC)   • Lymphedema of both lower extremities   • Nephrostomy status (CMS/HCC)   • Environmental and seasonal allergies   • AV fistula (CMS/HCC)   • Primary osteoarthritis of both knees   • Sciatica of left side   • Class 1 obesity with serious comorbidity and body mass index (BMI) of 32.0 to 32.9 in adult   • Influenza vaccination declined   • Pneumococcal vaccination declined       Advanced Care Planning:  Patient has an advance directive - a copy has been provided and is visible in patient header    Review of Systems   Constitutional: Positive for fatigue.   Cardiovascular: Positive for leg swelling (chronic). Negative for chest pain.   Musculoskeletal: Positive for arthralgias and gait problem (chronic).   All other systems reviewed and are negative.      Compared to one year ago, the patient feels her physical health is the same.  Compared to one year ago, the patient feels her mental health is the same.    Reviewed chart for potential of high risk medication in the elderly: yes  Reviewed chart for potential of harmful drug interactions in the elderly:yes    Objective         Vitals:    02/03/20 0843   BP: 118/60   Pulse: 80   Resp: 18   Temp: 97.1 °F (36.2 °C)   TempSrc: Temporal   SpO2: 97%   Weight: 81.4 kg (179 lb 8 oz)   Height: 157.5 cm (62.01\")   PainSc:   5       Body mass index is 32.82 kg/m².  Discussed the patient's BMI with her. The BMI is above average; no BMI management plan is appropriate..    Physical Exam   Constitutional: She is oriented to person, place, and time. Vital signs are normal. She appears well-developed and well-nourished. She is active.  Non-toxic appearance. She has a sickly appearance. She does not appear ill. No distress.   Appears stated age. Well groomed.    HENT:   Head: Normocephalic and atraumatic. Hair is normal.   Right Ear: Hearing, tympanic membrane, external ear and ear canal normal.   Left Ear: " Hearing, tympanic membrane, external ear and ear canal normal.   Nose: Nose normal.   Mouth/Throat: Mucous membranes are dry. She has dentures.   Eyes: Pupils are equal, round, and reactive to light. Conjunctivae, EOM and lids are normal. Right eye exhibits no discharge. Left eye exhibits no discharge. No scleral icterus.   Neck: Phonation normal. Neck supple.   Cardiovascular: Regular rhythm. Bradycardia present.   Murmur heard.  Pulmonary/Chest: Effort normal and breath sounds normal.   Abdominal: Soft. Bowel sounds are normal.   Musculoskeletal:        Right hand: She exhibits deformity.        Left hand: She exhibits deformity.        Right lower leg: She exhibits edema.   Urine bags on both legs from nephrostomy tubes bilaterally   Neurological: She is alert and oriented to person, place, and time. She displays no tremor. No cranial nerve deficit. Gait abnormal.   Skin: Skin is warm. No rash noted. She is not diaphoretic. No cyanosis. Nails show no clubbing.   Psychiatric: She has a normal mood and affect. Her speech is normal and behavior is normal. Judgment and thought content normal. Cognition and memory are normal.   Nursing note and vitals reviewed.            Assessment/Plan   Medicare Risks and Personalized Health Plan  CMS Preventative Services Quick Reference  Chronic Pain   Immunizations Discussed/Encouraged (specific immunizations; Influenza and pneumonia shots (declines). )    The above risks/problems have been discussed with the patient.  Pertinent information has been shared with the patient in the After Visit Summary.  Follow up plans and orders are seen below in the Assessment/Plan Section.    Diagnoses and all orders for this visit:    1. Medicare annual wellness visit, subsequent (Primary)    2. ESRD (end stage renal disease) on dialysis (CMS/[facility])  Comments:  follow up with nephrology    3. Anemia, chronic renal failure, stage 5 (CMS/[facility])  Comments:  follow up with nephrology    4. Benign  hypertension with end-stage renal disease (CMS/[facility])  Comments:  bp stable, follow up with nephrology    5. Nephrostomy status (CMS/[facility])  Comments:  bilateral, follow up with urology    6. AV fistula (CMS/[facility])  Comments:  follow up with nephrology    7. Endometrial cancer (CMS/[facility])  Comments:  hx, recent vag bleeding, follow up gyn/onc    8. Primary osteoarthritis of both knees  -     diclofenac (VOLTAREN) 1 % gel gel; Apply  topically to the appropriate area as directed 3 (Three) Times a Day.  Dispense: 300 g; Refill: 3    9. Class 1 obesity with serious comorbidity and body mass index (BMI) of 32.0 to 32.9 in adult, unspecified obesity type    10. Influenza vaccination declined    11. Pneumococcal vaccination declined      Follow Up:  Return in about 1 year (around 2/5/2021) for Medicare Wellness or sooner if needed.     An After Visit Summary and PPPS were given to the patient.

## 2020-02-03 NOTE — PATIENT INSTRUCTIONS
Medicare Wellness  Personal Prevention Plan of Service     Date of Office Visit:  2020  Encounter Provider:  Michelle Wynn MD  Place of Service:  [facility]  Patient Name: Pamela Herrera  :  1945    As part of the Medicare Wellness portion of your visit today, we are providing you with this personalized preventive plan of services (PPPS). This plan is based upon recommendations of the [place] Preventive Services Task Force (USPSTF) and the Advisory Committee on Immunization Practices (ACIP).    This lists the preventive care services that should be considered, and provides dates of when you are due. Items listed as completed are up-to-date and do not require any further intervention.    Health Maintenance   Topic Date Due   • PNEUMOCOCCAL VACCINE (65+ HIGH RISK) (1 of 2 - PCV13) 2021 (Originally 2008)   • MEDICARE ANNUAL WELLNESS  2021   • COLONOSCOPY  2028   • TDAP/TD VACCINES (2 - Td) 10/11/2028   • INFLUENZA VACCINE  Addressed   • MAMMOGRAM  Discontinued   • ZOSTER VACCINE  Discontinued       No orders of the defined types were placed in this encounter.      Return in about 1 year (around 2021) for Medicare Wellness or sooner if needed.

## 2020-02-03 NOTE — TELEPHONE ENCOUNTER
Spoke with Rut and was advised per the insurance, the amount used daily needs to be on the prescription. Gave a verbal that the patient will use 2g tid per day.

## 2020-03-10 ENCOUNTER — OFFICE VISIT (OUTPATIENT)
Dept: INTERNAL MEDICINE | Facility: CLINIC | Age: 75
End: 2020-03-10

## 2020-03-10 ENCOUNTER — TELEPHONE (OUTPATIENT)
Dept: INTERNAL MEDICINE | Facility: CLINIC | Age: 75
End: 2020-03-10

## 2020-03-10 VITALS
TEMPERATURE: 97.1 F | HEIGHT: 62 IN | OXYGEN SATURATION: 96 % | BODY MASS INDEX: 31.83 KG/M2 | SYSTOLIC BLOOD PRESSURE: 94 MMHG | HEART RATE: 70 BPM | DIASTOLIC BLOOD PRESSURE: 45 MMHG | WEIGHT: 173 LBS

## 2020-03-10 DIAGNOSIS — R53.83 FATIGUE, UNSPECIFIED TYPE: ICD-10-CM

## 2020-03-10 DIAGNOSIS — R56.9 WITNESSED SEIZURE-LIKE ACTIVITY (HCC): ICD-10-CM

## 2020-03-10 DIAGNOSIS — R19.5 FECAL OCCULT BLOOD TEST POSITIVE: ICD-10-CM

## 2020-03-10 DIAGNOSIS — R31.9 URINARY TRACT INFECTION WITH HEMATURIA, SITE UNSPECIFIED: ICD-10-CM

## 2020-03-10 DIAGNOSIS — N39.0 URINARY TRACT INFECTION WITH HEMATURIA, SITE UNSPECIFIED: ICD-10-CM

## 2020-03-10 DIAGNOSIS — Z13.0 SCREENING FOR ENDOCRINE, METABOLIC AND IMMUNITY DISORDER: ICD-10-CM

## 2020-03-10 DIAGNOSIS — Z13.29 SCREENING FOR ENDOCRINE, METABOLIC AND IMMUNITY DISORDER: ICD-10-CM

## 2020-03-10 DIAGNOSIS — Z13.228 SCREENING FOR ENDOCRINE, METABOLIC AND IMMUNITY DISORDER: ICD-10-CM

## 2020-03-10 DIAGNOSIS — Z13.0 SCREENING FOR DISORDER OF BLOOD AND BLOOD-FORMING ORGANS: Primary | ICD-10-CM

## 2020-03-10 LAB
ALBUMIN SERPL-MCNC: 3.8 G/DL (ref 3.5–5.2)
ALBUMIN/GLOB SERPL: 1.3 G/DL
ALP SERPL-CCNC: 76 U/L (ref 39–117)
ALT SERPL-CCNC: 5 U/L (ref 1–33)
AST SERPL-CCNC: 16 U/L (ref 1–32)
BASOPHILS # BLD AUTO: 0.01 10*3/MM3 (ref 0–0.2)
BASOPHILS NFR BLD AUTO: 0.2 % (ref 0–1.5)
BILIRUB BLD-MCNC: NEGATIVE MG/DL
BILIRUB SERPL-MCNC: 0.4 MG/DL (ref 0.2–1.2)
BUN SERPL-MCNC: 18 MG/DL (ref 8–23)
BUN/CREAT SERPL: 3.5 (ref 7–25)
CALCIUM SERPL-MCNC: 8.3 MG/DL (ref 8.6–10.5)
CHLORIDE SERPL-SCNC: 90 MMOL/L (ref 98–107)
CLARITY, POC: ABNORMAL
CO2 SERPL-SCNC: 27.4 MMOL/L (ref 22–29)
COLOR UR: ABNORMAL
CREAT SERPL-MCNC: 5.13 MG/DL (ref 0.57–1)
EOSINOPHIL # BLD AUTO: 0.04 10*3/MM3 (ref 0–0.4)
EOSINOPHIL NFR BLD AUTO: 0.8 % (ref 0.3–6.2)
ERYTHROCYTE [DISTWIDTH] IN BLOOD BY AUTOMATED COUNT: 13.8 % (ref 12.3–15.4)
EXPIRATION DATE: ABNORMAL
GASTROCULT GAST QL: POSITIVE
GLOBULIN SER CALC-MCNC: 3 GM/DL
GLUCOSE SERPL-MCNC: 198 MG/DL (ref 65–99)
GLUCOSE UR STRIP-MCNC: NEGATIVE MG/DL
HCT VFR BLD AUTO: 32.2 % (ref 34–46.6)
HGB BLD-MCNC: 11.1 G/DL (ref 12–15.9)
IMM GRANULOCYTES # BLD AUTO: 0.01 10*3/MM3 (ref 0–0.05)
IMM GRANULOCYTES NFR BLD AUTO: 0.2 % (ref 0–0.5)
KETONES UR QL: NEGATIVE
LEUKOCYTE EST, POC: ABNORMAL
LYMPHOCYTES # BLD AUTO: 0.79 10*3/MM3 (ref 0.7–3.1)
LYMPHOCYTES NFR BLD AUTO: 15.3 % (ref 19.6–45.3)
Lab: ABNORMAL
MCH RBC QN AUTO: 31.4 PG (ref 26.6–33)
MCHC RBC AUTO-ENTMCNC: 34.5 G/DL (ref 31.5–35.7)
MCV RBC AUTO: 91.2 FL (ref 79–97)
MONOCYTES # BLD AUTO: 0.45 10*3/MM3 (ref 0.1–0.9)
MONOCYTES NFR BLD AUTO: 8.7 % (ref 5–12)
NEUTROPHILS # BLD AUTO: 3.86 10*3/MM3 (ref 1.7–7)
NEUTROPHILS NFR BLD AUTO: 74.8 % (ref 42.7–76)
NITRITE UR-MCNC: NEGATIVE MG/ML
NRBC BLD AUTO-RTO: 0 /100 WBC (ref 0–0.2)
PH UR: 8 [PH] (ref 5–8)
PLATELET # BLD AUTO: 137 10*3/MM3 (ref 140–450)
POTASSIUM SERPL-SCNC: 3.5 MMOL/L (ref 3.5–5.2)
PROT SERPL-MCNC: 6.8 G/DL (ref 6–8.5)
PROT UR STRIP-MCNC: ABNORMAL MG/DL
RBC # BLD AUTO: 3.53 10*6/MM3 (ref 3.77–5.28)
RBC # UR STRIP: ABNORMAL /UL
SODIUM SERPL-SCNC: 135 MMOL/L (ref 136–145)
SP GR UR: 1.01 (ref 1–1.03)
T4 FREE SERPL-MCNC: 1.29 NG/DL (ref 0.93–1.7)
TSH SERPL DL<=0.005 MIU/L-ACNC: 1.14 UIU/ML (ref 0.27–4.2)
UROBILINOGEN UR QL: NORMAL
WBC # BLD AUTO: 5.16 10*3/MM3 (ref 3.4–10.8)

## 2020-03-10 PROCEDURE — 82274 ASSAY TEST FOR BLOOD FECAL: CPT | Performed by: NURSE PRACTITIONER

## 2020-03-10 PROCEDURE — 99214 OFFICE O/P EST MOD 30 MIN: CPT | Performed by: NURSE PRACTITIONER

## 2020-03-10 PROCEDURE — 81003 URINALYSIS AUTO W/O SCOPE: CPT | Performed by: NURSE PRACTITIONER

## 2020-03-10 RX ORDER — CLINDAMYCIN HYDROCHLORIDE 300 MG/1
300 CAPSULE ORAL 3 TIMES DAILY
Qty: 30 CAPSULE | Refills: 0 | Status: SHIPPED | OUTPATIENT
Start: 2020-03-10 | End: 2020-03-20

## 2020-03-10 NOTE — PROGRESS NOTES
Date: 03/10/2020    Name: Pamela Herrera  : 1945    Chief Complaint:   Chief Complaint   Patient presents with   • Follow-up     ER        HPI:  Pamela Herrera is a 74 y.o. female presents for follow up of  ED visit on 3/7/20.  She was at home with her daughter, working in the kitchen when she had what appeared to be a seizure.  Daughter noted tonic-clonic movements at around 5 PM lasted 3 to 5 minutes after which her daughter turned her on her left side and patient fell asleep.  Daughter called 911, patient was still sleeping when they arrived.  Patient does not recall the event.  Has not had seizures before.  No sick contacts; denies fever, chills, vomiting.  Admits to having diarrhea intermittently for a week prior to seizure.  She continues to have occasional diarrhea, states last 2 bowel movements in the past 2 days have been solid.  Has not noticed a particular odor, does state they are dark.  No neuro deficit other than being extremely fatigued since seizure activity.  No further seizure activity.  CT of the head indicated no acute intracranial abnormality.  EKG normal, labs consistent with CKD, chest x-ray normal.  Urinalysis was abnormal with large amount of blood, large leuks, positive nitrites; she was advised to follow-up with PCP or nephrologist for further evaluation of abnormal urinalysis.  She was discharged on 3/8/2020, neurology appointment to be scheduled in 1 to 2 weeks for EEG, MRI.  Patient has a history of endometrial cancer, CKD on hemodialysis MWF with no missed appointments, HTN.  Nephrostomy tubes were changed the week before episode.  Daughter is concerned that may have had something to do with reported seizure activity.  Continues to deny fever, chills, myalgia, n/v, confusion, headache, dizziness, slurred speech, palpitations, chest pain, change in vision.      History: The following portions of the patient's history were reviewed and updated as appropriate: allergies,  "current medications, past medical history, family history, surgical history, social history and problem list.      ROS:  Review of Systems   Constitutional: Negative for appetite change.   HENT: Negative for congestion, ear pain, hearing loss, postnasal drip, rhinorrhea, sneezing, sore throat, tinnitus, trouble swallowing and voice change.    Respiratory: Negative for chest tightness and wheezing.    Genitourinary: Negative for dysuria, frequency and urgency.   Neurological: Negative for tremors, syncope and facial asymmetry.       VS:  Vitals:    03/10/20 1044   BP: 94/45  Comment: automatic   Pulse: 70   Temp: 97.1 °F (36.2 °C)   TempSrc: Temporal   SpO2: 96%   Weight: 78.5 kg (173 lb)   Height: 157.5 cm (62.01\")     Body mass index is 31.63 kg/m².  PE:  Physical Exam   Constitutional: She is oriented to person, place, and time. She appears well-developed and well-nourished. No distress.   HENT:   Head: Normocephalic.   Nose: Nose normal.   Mouth/Throat: Oropharynx is clear and moist.   Eyes: Pupils are equal, round, and reactive to light. Conjunctivae and EOM are normal.   Neck: Normal range of motion. Neck supple.   Cardiovascular: Normal rate, regular rhythm and intact distal pulses.   Murmur heard.  Pulmonary/Chest: Effort normal and breath sounds normal.   Abdominal: There is no tenderness. There is no CVA tenderness.   Neurological: She is alert and oriented to person, place, and time. She has normal strength. No sensory deficit. Coordination normal.   In wheelchair, CN II-XII normal   Skin: Skin is warm. Capillary refill takes less than 2 seconds.       Assessment/Plan:  Pamela was seen today for follow-up.    Diagnoses and all orders for this visit:    Screening for disorder of blood and blood-forming organs  -     CBC & Differential    Screening for endocrine, metabolic and immunity disorder  -     Comprehensive Metabolic Panel    Fatigue, unspecified type  -     T4, Free  -     TSH  -     POCT Occult " blood x 3, stool; Future    Urinary tract infection with hematuria, site unspecified  -     clindamycin (CLEOCIN) 300 MG capsule; Take 1 capsule by mouth 3 (Three) Times a Day for 10 days.  -     POCT urinalysis dipstick, automated.  Cup given to daughter, will obtain urine sample when available.    -     Urine Culture - Urine, Urine, Clean Catch; Future    Witnessed seizure like activity        - Follow up with neurology as scheduled        - Monitor for abnormal muscle tone, LOC until appointment with neurology. Should changes be noted, call 911    Return in about 4 weeks (around 4/7/2020) for Next scheduled follow up.

## 2020-03-28 DIAGNOSIS — M17.0 PRIMARY OSTEOARTHRITIS OF BOTH KNEES: ICD-10-CM

## 2020-05-01 RX ORDER — PANTOPRAZOLE SODIUM 40 MG/1
TABLET, DELAYED RELEASE ORAL
Qty: 90 TABLET | Refills: 1 | Status: SHIPPED | OUTPATIENT
Start: 2020-05-01 | End: 2021-02-26

## 2020-06-03 ENCOUNTER — HOSPITAL ENCOUNTER (OUTPATIENT)
Facility: HOSPITAL | Age: 75
Discharge: HOME | End: 2020-06-03
Payer: MEDICARE

## 2020-06-03 LAB
SARS-COV-2, NAAT: NOT DETECTED
SARS-COV-2, PCR: NORMAL

## 2020-06-03 PROCEDURE — U0002 COVID-19 LAB TEST NON-CDC: HCPCS

## 2020-06-03 PROCEDURE — U0003 INFECTIOUS AGENT DETECTION BY NUCLEIC ACID (DNA OR RNA); SEVERE ACUTE RESPIRATORY SYNDROME CORONAVIRUS 2 (SARS-COV-2) (CORONAVIRUS DISEASE [COVID-19]), AMPLIFIED PROBE TECHNIQUE, MAKING USE OF HIGH THROUGHPUT TECHNOLOGIES AS DESCRIBED BY CMS-2020-01-R: HCPCS

## 2020-07-06 DIAGNOSIS — M17.0 PRIMARY OSTEOARTHRITIS OF BOTH KNEES: ICD-10-CM

## 2020-08-28 DIAGNOSIS — M54.32 SCIATICA OF LEFT SIDE: ICD-10-CM

## 2020-08-28 RX ORDER — GABAPENTIN 100 MG/1
100 CAPSULE ORAL 3 TIMES DAILY PRN
Qty: 270 CAPSULE | Refills: 0 | Status: SHIPPED | OUTPATIENT
Start: 2020-08-28 | End: 2021-01-05

## 2020-10-07 DIAGNOSIS — M17.0 PRIMARY OSTEOARTHRITIS OF BOTH KNEES: ICD-10-CM

## 2020-11-07 ENCOUNTER — LAB (OUTPATIENT)
Dept: LAB | Facility: HOSPITAL | Age: 75
End: 2020-11-07

## 2020-11-07 ENCOUNTER — TRANSCRIBE ORDERS (OUTPATIENT)
Dept: ADMINISTRATIVE | Facility: HOSPITAL | Age: 75
End: 2020-11-07

## 2020-11-07 DIAGNOSIS — U07.1 COVID-19: ICD-10-CM

## 2020-11-07 DIAGNOSIS — U07.1 COVID-19: Primary | ICD-10-CM

## 2020-11-07 PROCEDURE — C9803 HOPD COVID-19 SPEC COLLECT: HCPCS

## 2020-11-07 PROCEDURE — U0004 COV-19 TEST NON-CDC HGH THRU: HCPCS | Performed by: INTERNAL MEDICINE

## 2020-11-08 LAB — SARS-COV-2 RNA RESP QL NAA+PROBE: DETECTED

## 2020-11-18 NOTE — TELEPHONE ENCOUNTER
Looks like this was filled by Dr. Chowdary last time but Dr. Wynn is the PCP and has seen patient       Last office visit with extender 03/10/2020

## 2020-11-23 RX ORDER — CARVEDILOL 25 MG/1
25 TABLET ORAL 2 TIMES DAILY
Qty: 180 TABLET | Refills: 3 | Status: SHIPPED | OUTPATIENT
Start: 2020-11-23 | End: 2021-11-29

## 2020-11-23 NOTE — TELEPHONE ENCOUNTER
PATIENT CALLED BACK IN REGARDS TO THE REFILL FOR THIS MEDICATION, PHARMACY TOLD HER THAT THEY HAVE NOT RECEIVED A CONFIRMATION FROM THE  TO FILL.     PATIENT CONTACT: [phone]

## 2021-01-05 DIAGNOSIS — M54.32 SCIATICA OF LEFT SIDE: ICD-10-CM

## 2021-01-05 RX ORDER — GABAPENTIN 100 MG/1
100 CAPSULE ORAL 3 TIMES DAILY PRN
Qty: 270 CAPSULE | Refills: 0 | Status: SHIPPED | OUTPATIENT
Start: 2021-01-05 | End: 2021-05-27

## 2021-02-26 ENCOUNTER — OFFICE VISIT (OUTPATIENT)
Dept: INTERNAL MEDICINE | Facility: CLINIC | Age: 76
End: 2021-02-26

## 2021-02-26 VITALS
OXYGEN SATURATION: 98 % | DIASTOLIC BLOOD PRESSURE: 76 MMHG | BODY MASS INDEX: 31.43 KG/M2 | SYSTOLIC BLOOD PRESSURE: 122 MMHG | HEART RATE: 76 BPM | TEMPERATURE: 97.1 F | HEIGHT: 62 IN | WEIGHT: 170.8 LBS

## 2021-02-26 DIAGNOSIS — N25.81 SECONDARY HYPERPARATHYROIDISM OF RENAL ORIGIN (HCC): ICD-10-CM

## 2021-02-26 DIAGNOSIS — R73.9 HYPERGLYCEMIA: ICD-10-CM

## 2021-02-26 DIAGNOSIS — Z00.00 MEDICARE ANNUAL WELLNESS VISIT, SUBSEQUENT: Primary | ICD-10-CM

## 2021-02-26 DIAGNOSIS — M15.9 PRIMARY OSTEOARTHRITIS INVOLVING MULTIPLE JOINTS: ICD-10-CM

## 2021-02-26 DIAGNOSIS — Z11.59 NEED FOR HEPATITIS C SCREENING TEST: ICD-10-CM

## 2021-02-26 DIAGNOSIS — D63.1 ANEMIA, CHRONIC RENAL FAILURE, STAGE 5 (HCC): ICD-10-CM

## 2021-02-26 DIAGNOSIS — Z99.2 ESRD (END STAGE RENAL DISEASE) ON DIALYSIS (HCC): ICD-10-CM

## 2021-02-26 DIAGNOSIS — C54.1 ENDOMETRIAL CANCER (HCC): ICD-10-CM

## 2021-02-26 DIAGNOSIS — I77.0 AV FISTULA (HCC): ICD-10-CM

## 2021-02-26 DIAGNOSIS — Z93.6 NEPHROSTOMY STATUS (HCC): ICD-10-CM

## 2021-02-26 DIAGNOSIS — N18.6 ESRD (END STAGE RENAL DISEASE) ON DIALYSIS (HCC): ICD-10-CM

## 2021-02-26 DIAGNOSIS — I12.0 BENIGN HYPERTENSION WITH END-STAGE RENAL DISEASE (HCC): ICD-10-CM

## 2021-02-26 DIAGNOSIS — N18.6 BENIGN HYPERTENSION WITH END-STAGE RENAL DISEASE (HCC): ICD-10-CM

## 2021-02-26 DIAGNOSIS — N18.5 ANEMIA, CHRONIC RENAL FAILURE, STAGE 5 (HCC): ICD-10-CM

## 2021-02-26 DIAGNOSIS — Z28.21 PNEUMOCOCCAL VACCINATION DECLINED: ICD-10-CM

## 2021-02-26 DIAGNOSIS — Z91.81 AT HIGH RISK FOR FALLS: ICD-10-CM

## 2021-02-26 DIAGNOSIS — E66.9 CLASS 1 OBESITY WITH SERIOUS COMORBIDITY AND BODY MASS INDEX (BMI) OF 31.0 TO 31.9 IN ADULT, UNSPECIFIED OBESITY TYPE: ICD-10-CM

## 2021-02-26 DIAGNOSIS — Z28.21 INFLUENZA VACCINATION DECLINED: ICD-10-CM

## 2021-02-26 LAB
BUN BLD-MCNC: 56 MG/DL
CALCIUM SPEC-SCNC: 8 MG/DL (ref 8.6–10.5)
CHLORIDE BLD-SCNC: 99 MMOL/L
CREAT UR-MCNC: 7.11 MG/DL (ref 0.6–1.3)
ERYTHROCYTE [DISTWIDTH] IN BLOOD BY AUTOMATED COUNT: 14.1 %
EST GFR BY CLEARANCE: 6 ML/MIN
EXTERNAL HEMATOCRIT: 30 %
GLUCOSE BLD-MCNC: 137 MG/DL
HBA1C MFR BLD: 5.4 %
HCV AB SER DONR QL: NORMAL
HGB BLD-MCNC: 10.2 G/DL
Lab: 25
MCV RBC AUTO: 92 FL
PLATELETS: 155
POTASSIUM BLD-SCNC: 4 MMOL/L
SODIUM BLD-SCNC: 140 MMOL/L
WBC # BLD: 7.34 10*3/UL

## 2021-02-26 PROCEDURE — G0439 PPPS, SUBSEQ VISIT: HCPCS | Performed by: FAMILY MEDICINE

## 2021-02-26 PROCEDURE — 83036 HEMOGLOBIN GLYCOSYLATED A1C: CPT | Performed by: FAMILY MEDICINE

## 2021-02-26 RX ORDER — AMLODIPINE BESYLATE 10 MG/1
10 TABLET ORAL DAILY
Qty: 90 TABLET | Refills: 3 | Status: SHIPPED | OUTPATIENT
Start: 2021-02-26 | End: 2022-03-04 | Stop reason: SDUPTHER

## 2021-02-26 NOTE — PROGRESS NOTES
"The ABCs of the Annual Wellness Visit  Subsequent Medicare Wellness Visit    Chief Complaint   Patient presents with   • Medicare Wellness-subsequent       Subjective   History of Present Illness:  Pamela Herrera is a 75 y.o. female who presents for a Subsequent Medicare Wellness Visit.    HEALTH RISK ASSESSMENT    Recent Hospitalizations:  {Hospitalization history:[id number]::\"No hospitalization(s) within the last year.\"}    Current Medical Providers:  Patient Care Team:  Michelle Wynn MD as PCP - General (Family Medicine)  Ok Kunz MD as Surgeon (Orthopedic Surgery)  Nikko Morel MD, CYNTHIA as Consulting Physician (Nephrology)  Yomaira Santiago MD as Consulting Physician (Gynecologic Oncology)  Stefany Allen MD as Consulting Physician (Urology)    Smoking Status:  Social History     Tobacco Use   Smoking Status Never Smoker   Smokeless Tobacco Never Used       Alcohol Consumption:  Social History     Substance and Sexual Activity   Alcohol Use No       Depression Screen:   PHQ-2/PHQ-9 Depression Screening 2/26/2021   Little interest or pleasure in doing things 0   Feeling down, depressed, or hopeless 0   Total Score 0       Fall Risk Screen:  STEADI Fall Risk Assessment has not been completed.    Health Habits and Functional and Cognitive Screening:  Functional & Cognitive Status 2/26/2021   Do you have difficulty preparing food and eating? No   Do you have difficulty bathing yourself, getting dressed or grooming yourself? No   Do you have difficulty using the toilet? No   Do you have difficulty moving around from place to place? No   Do you have trouble with steps or getting out of a bed or a chair? Yes   Current Diet Well Balanced Diet   Dental Exam Up to date   Eye Exam Up to date   Exercise (times per week) 0 times per week   Current Exercise Activities Include None   Do you need help using the phone?  No   Are you deaf or do you have serious difficulty hearing?  No   Do you " "need help with transportation? No   Do you need help shopping? No   Do you need help preparing meals?  No   Do you need help with housework?  No   Do you need help with laundry? No   Do you need help taking your medications? No   Do you need help managing money? No   Do you ever drive or ride in a car without wearing a seat belt? No   Have you felt unusual stress, anger or loneliness in the last month? No   Who do you live with? Child   If you need help, do you have trouble finding someone available to you? No   Have you been bothered in the last four weeks by sexual problems? No   Do you have difficulty concentrating, remembering or making decisions? No         Does the patient have evidence of cognitive impairment? {Yes/No w/ pre-defaulted No:61889::\"No\"}    Asprin use counseling:{Aspirin :61205}    Age-appropriate Screening Schedule:  Refer to the list below for future screening recommendations based on patient's age, sex and/or medical conditions. Orders for these recommended tests are listed in the plan section. The patient has been provided with a written plan.    Health Maintenance   Topic Date Due   • DXA SCAN  1945   • HEMOGLOBIN A1C  05/05/2016   • INFLUENZA VACCINE  08/01/2020   • DIABETIC EYE EXAM  08/07/2021   • COLONOSCOPY  04/13/2028   • TDAP/TD VACCINES (2 - Td) 10/11/2028   • MAMMOGRAM  Discontinued   • URINE MICROALBUMIN  Discontinued   • ZOSTER VACCINE  Discontinued          The following portions of the patient's history were reviewed and updated as appropriate: allergies, current medications, past family history, past medical history, past social history, past surgical history and problem list.    Outpatient Medications Prior to Visit   Medication Sig Dispense Refill   • acetaminophen (TYLENOL) 650 MG 8 hr tablet Take 650 mg by mouth Every 8 (Eight) Hours As Needed for Mild Pain .     • betamethasone valerate (VALISONE) 0.1 % cream betamethasone valerate 0.1 % topical cream   APPLY " A THIN LAYER TO THE AFFECTED AREA(S) BY TOPICAL ROUTE ONCE DAILY X 7 DAYS THEN ONCE WEEKLY PRN     • CALCIUM PO Take  by mouth.     • carvedilol (COREG) 25 MG tablet Take 1 tablet by mouth 2 (Two) Times a Day. Indications: High Blood Pressure Disorder 180 tablet 3   • COLLAGEN-VITAMIN C PO Take  by mouth.     • diclofenac (VOLTAREN) 1 % gel gel APPLY TO APPROPRIATE AREA AS DIRECTED 3 TIMES A  g 3   • fluticasone (FLONASE) 50 MCG/ACT nasal spray 1 spray into the nostril(s) as directed by provider Daily. 3 bottle 3   • gabapentin (NEURONTIN) 100 MG capsule TAKE 1 CAPSULE BY MOUTH 3 (THREE) TIMES A DAY AS NEEDED (NERVE PAIN). 270 capsule 0   • HYDROcodone-acetaminophen (NORCO) 5-325 MG per tablet Take 1 tablet by mouth Every 8 (Eight) Hours As Needed for Moderate Pain . 30 tablet 0   • Misc Natural Products (OSTEO BI-FLEX ADV JOINT SHIELD) tablet Take  by mouth.     • Prenatal Vit-Fe Fumarate-FA (PRENATAL PO) Take  by mouth.     • Probiotic Product (PROBIOTIC-10 PO) Take  by mouth.     • triamcinolone (KENALOG) 0.1 % cream Apply  topically 1 (One) Time Per Week. 45 g 5   • amLODIPine (NORVASC) 10 MG tablet TAKE 1 TABLET BY MOUTH EVERY DAY 90 tablet 2   • pantoprazole (PROTONIX) 40 MG EC tablet TAKE 1 TABLET BY MOUTH EVERY DAY 90 tablet 1     No facility-administered medications prior to visit.        Patient Active Problem List   Diagnosis   • ESRD (end stage renal disease) on dialysis (CMS/[facility])   • Anemia, chronic renal failure, stage 5 (CMS/HCC)   • Benign hypertension with end-stage renal disease (CMS/HCC)   • Arthritis   • Endometrial cancer (CMS/HCC)   • Lymphedema of both lower extremities   • Nephrostomy status (CMS/HCC)   • Environmental and seasonal allergies   • AV fistula (CMS/HCC)   • Primary osteoarthritis of both knees   • Sciatica of left side   • Class 1 obesity with serious comorbidity and body mass index (BMI) of 32.0 to 32.9 in adult   • Influenza vaccination declined   • Pneumococcal  "vaccination declined       Advanced Care Planning:  ACP discussion was held with the patient during this visit. Patient has an advance directive in EMR which is still valid.     Review of Systems    Compared to one year ago, the patient feels her physical health is {better worse same:79602}.  Compared to one year ago, the patient feels her mental health is {better worse same:98522}.    Reviewed chart for potential of high risk medication in the elderly: yes  Reviewed chart for potential of harmful drug interactions in the elderly:yes    Objective         Vitals:    02/26/21 0758   BP: 122/76   Pulse: 76   Temp: 97.1 °F (36.2 °C)   TempSrc: Infrared   SpO2: 98%   Weight: 77.5 kg (170 lb 12.8 oz)   Height: 157.5 cm (62.01\")       Body mass index is 31.23 kg/m².  Discussed the patient's BMI with her. The BMI is above average; BMI management plan is completed.    Physical Exam          Assessment/Plan   Medicare Risks and Personalized Health Plan  CMS Preventative Services Quick Reference  {Medicare Wellness Risk Factors and Personalized Health Plan:72656}    The above risks/problems have been discussed with the patient.  Pertinent information has been shared with the patient in the After Visit Summary.  Follow up plans and orders are seen below in the Assessment/Plan Section.    Diagnoses and all orders for this visit:    1. Medicare annual wellness visit, subsequent (Primary)    Other orders  -     amLODIPine (NORVASC) 10 MG tablet; Take 1 tablet by mouth Daily. Indications: High Blood Pressure Disorder  Dispense: 90 tablet; Refill: 3      Follow Up:  No follow-ups on file.     An After Visit Summary and PPPS were given to the patient.             "

## 2021-02-26 NOTE — PROGRESS NOTES
The ABCs of the Annual Wellness Visit  Subsequent Medicare Wellness Visit    Chief Complaint   Patient presents with   • Medicare Wellness-subsequent       Subjective   History of Present Illness:  Pamela Herrera is a 75 y.o. female who presents for a Subsequent Medicare Wellness Visit.    76 yo treated for endometrial cancer with radiation which led to ureter damage and subsequent end stage renal disease. On dialysis, nephrostomy tubes in place due to ureteral damage. Was told in past was prediabetic, maybe diabetic, but she thinks that was incorrect. Overall feels okay, has improved in last year or two, hair growing better. Daughter here with her, continues to help with care.     HEALTH RISK ASSESSMENT    Recent Hospitalizations:  Recently treated at the following:  Other:     Current Medical Providers:  Patient Care Team:  Michelle Wynn MD as PCP - General (Family Medicine)  Ok Kunz MD as Surgeon (Orthopedic Surgery)  Nikko Morel MD, CYNTHIA as Consulting Physician (Nephrology)  Yomaira Santiago MD as Consulting Physician (Gynecologic Oncology)  Stefany Allen MD as Consulting Physician (Urology)    Smoking Status:  Social History     Tobacco Use   Smoking Status Never Smoker   Smokeless Tobacco Never Used       Alcohol Consumption:  Social History     Substance and Sexual Activity   Alcohol Use No       Depression Screen:   PHQ-2/PHQ-9 Depression Screening 2/26/2021   Little interest or pleasure in doing things 0   Feeling down, depressed, or hopeless 0   Total Score 0       Fall Risk Screen:  STEADI Fall Risk Assessment was completed, and patient is at HIGH risk for falls. Assessment completed on:2/26/2021    Health Habits and Functional and Cognitive Screening:  Functional & Cognitive Status 2/26/2021   Do you have difficulty preparing food and eating? No   Do you have difficulty bathing yourself, getting dressed or grooming yourself? No   Do you have difficulty using the  toilet? No   Do you have difficulty moving around from place to place? No   Do you have trouble with steps or getting out of a bed or a chair? Yes   Current Diet Well Balanced Diet   Dental Exam Up to date   Eye Exam Up to date   Exercise (times per week) 0 times per week   Current Exercise Activities Include None   Do you need help using the phone?  No   Are you deaf or do you have serious difficulty hearing?  No   Do you need help with transportation? No   Do you need help shopping? No   Do you need help preparing meals?  No   Do you need help with housework?  No   Do you need help with laundry? No   Do you need help taking your medications? No   Do you need help managing money? No   Do you ever drive or ride in a car without wearing a seat belt? No   Have you felt unusual stress, anger or loneliness in the last month? No   Who do you live with? Child   If you need help, do you have trouble finding someone available to you? No   Have you been bothered in the last four weeks by sexual problems? No   Do you have difficulty concentrating, remembering or making decisions? No         Does the patient have evidence of cognitive impairment? No    Asprin use counseling:Does not need ASA (and currently is not on it)    Age-appropriate Screening Schedule:  Refer to the list below for future screening recommendations based on patient's age, sex and/or medical conditions. Orders for these recommended tests are listed in the plan section. The patient has been provided with a written plan.    Health Maintenance   Topic Date Due   • HEMOGLOBIN A1C  05/05/2016   • INFLUENZA VACCINE  03/31/2021 (Originally 8/1/2020)   • DIABETIC EYE EXAM  08/07/2021   • COLONOSCOPY  04/13/2028   • TDAP/TD VACCINES (2 - Td) 10/11/2028   • MAMMOGRAM  Discontinued   • URINE MICROALBUMIN  Discontinued   • DXA SCAN  Discontinued   • ZOSTER VACCINE  Discontinued          The following portions of the patient's history were reviewed and updated as  appropriate: allergies, current medications, past family history, past medical history, past social history, past surgical history and problem list.    Outpatient Medications Prior to Visit   Medication Sig Dispense Refill   • acetaminophen (TYLENOL) 650 MG 8 hr tablet Take 650 mg by mouth Every 8 (Eight) Hours As Needed for Mild Pain .     • betamethasone valerate (VALISONE) 0.1 % cream betamethasone valerate 0.1 % topical cream   APPLY A THIN LAYER TO THE AFFECTED AREA(S) BY TOPICAL ROUTE ONCE DAILY X 7 DAYS THEN ONCE WEEKLY PRN     • CALCIUM PO Take  by mouth.     • carvedilol (COREG) 25 MG tablet Take 1 tablet by mouth 2 (Two) Times a Day. Indications: High Blood Pressure Disorder 180 tablet 3   • COLLAGEN-VITAMIN C PO Take  by mouth.     • diclofenac (VOLTAREN) 1 % gel gel APPLY TO APPROPRIATE AREA AS DIRECTED 3 TIMES A  g 3   • fluticasone (FLONASE) 50 MCG/ACT nasal spray 1 spray into the nostril(s) as directed by provider Daily. 3 bottle 3   • gabapentin (NEURONTIN) 100 MG capsule TAKE 1 CAPSULE BY MOUTH 3 (THREE) TIMES A DAY AS NEEDED (NERVE PAIN). 270 capsule 0   • HYDROcodone-acetaminophen (NORCO) 5-325 MG per tablet Take 1 tablet by mouth Every 8 (Eight) Hours As Needed for Moderate Pain . 30 tablet 0   • Misc Natural Products (OSTEO BI-FLEX ADV JOINT SHIELD) tablet Take  by mouth.     • Prenatal Vit-Fe Fumarate-FA (PRENATAL PO) Take  by mouth.     • Probiotic Product (PROBIOTIC-10 PO) Take  by mouth.     • triamcinolone (KENALOG) 0.1 % cream Apply  topically 1 (One) Time Per Week. 45 g 5   • amLODIPine (NORVASC) 10 MG tablet TAKE 1 TABLET BY MOUTH EVERY DAY 90 tablet 2   • pantoprazole (PROTONIX) 40 MG EC tablet TAKE 1 TABLET BY MOUTH EVERY DAY 90 tablet 1     No facility-administered medications prior to visit.        Patient Active Problem List   Diagnosis   • ESRD (end stage renal disease) on dialysis (CMS/[facility])   • Anemia, chronic renal failure, stage 5 (CMS/[facility])   • Benign hypertension with  "end-stage renal disease (CMS/HCC)   • Primary osteoarthritis involving multiple joints   • Endometrial cancer (CMS/HCC)   • Lymphedema of both lower extremities   • Nephrostomy status (CMS/HCC)   • Environmental and seasonal allergies   • AV fistula (CMS/HCC)   • Primary osteoarthritis of both knees   • Sciatica of left side   • Class 1 obesity with serious comorbidity and body mass index (BMI) of 31.0 to 31.9 in adult   • Influenza vaccination declined   • Pneumococcal vaccination declined   • Secondary hyperparathyroidism of renal origin (CMS/HCC)       Advanced Care Planning:  ACP discussion was held with the patient during this visit. Patient has an advance directive in EMR which is still valid.     Review of Systems   Constitutional: Negative for fatigue and fever.   Respiratory: Negative for cough and shortness of breath.    Musculoskeletal: Positive for arthralgias.       Compared to one year ago, the patient feels her physical health is the same.  Compared to one year ago, the patient feels her mental health is worse.    Reviewed chart for potential of high risk medication in the elderly: yes  Reviewed chart for potential of harmful drug interactions in the elderly:yes    Objective         Vitals:    02/26/21 0758   BP: 122/76   Pulse: 76   Temp: 97.1 °F (36.2 °C)   TempSrc: Infrared   SpO2: 98%   Weight: 77.5 kg (170 lb 12.8 oz)   Height: 157.5 cm (62.01\")   PainSc:   5       Body mass index is 31.23 kg/m².  Discussed the patient's BMI with her. The BMI is above average; BMI management plan is completed.    Physical Exam  Vitals signs and nursing note reviewed.   Constitutional:       General: She is not in acute distress.     Appearance: Normal appearance. She is well-developed and well-groomed. She is obese. She is not ill-appearing, toxic-appearing or diaphoretic.      Interventions: Face mask in place.   HENT:      Head: Normocephalic and atraumatic.      Right Ear: Hearing normal.      Left Ear: " Hearing normal.   Eyes:      General: Lids are normal. No scleral icterus.     Extraocular Movements: Extraocular movements intact.   Neck:      Trachea: Phonation normal.   Cardiovascular:      Rate and Rhythm: Normal rate and regular rhythm.      Heart sounds: Murmur present.   Pulmonary:      Effort: Pulmonary effort is normal.      Breath sounds: Normal breath sounds.   Skin:     Coloration: Skin is not jaundiced.   Neurological:      General: No focal deficit present.      Mental Status: She is alert and oriented to person, place, and time.      Motor: Motor function is intact.      Gait: Gait abnormal (using rollator).   Psychiatric:         Attention and Perception: Attention and perception normal.         Mood and Affect: Mood and affect normal.         Speech: Speech normal.         Behavior: Behavior normal. Behavior is cooperative.         Thought Content: Thought content normal.         Cognition and Memory: Cognition and memory normal.         Judgment: Judgment normal.         Lab Results   Component Value Date    HGBA1C 5.4 02/26/2021        Assessment/Plan   Medicare Risks and Personalized Health Plan  CMS Preventative Services Quick Reference  Advance Directive Discussion  Cardiovascular risk  Chronic Pain   Dementia/Memory   Depression/Dysphoria  Diabetic Lab Screening   Fall Risk  Immunizations Discussed/Encouraged (specific immunizations; Influenza, Pneumococcal 23 and Covid-19 )  Obesity/Overweight   Polypharmacy    The above risks/problems have been discussed with the patient.  Pertinent information has been shared with the patient in the After Visit Summary.  Follow up plans and orders are seen below in the Assessment/Plan Section.    Diagnoses and all orders for this visit:    1. Medicare annual wellness visit, subsequent (Primary)    2. Endometrial cancer (CMS/[facility])  Comments:  follow up [facility]    3. ESRD (end stage renal disease) on dialysis (CMS/[facility])  Comments:  follow up nephrology    4.  Anemia, chronic renal failure, stage 5 (CMS/[facility])  Comments:  follow up nephrology    5. Secondary hyperparathyroidism of renal origin (CMS/[facility])  Comments:  follow up nephrology    6. Benign hypertension with end-stage renal disease (CMS/[facility])  Comments:  follow up nephrology  Orders:  -     amLODIPine (NORVASC) 10 MG tablet; Take 1 tablet by mouth Daily. Indications: High Blood Pressure Disorder  Dispense: 90 tablet; Refill: 3    7. Nephrostomy status (CMS/[facility])  Comments:  follow up urology, Dr. Allen retiring per daughter/pt    8. AV fistula (CMS/[facility])  Comments:  follow up nephrology    9. Class 1 obesity with serious comorbidity and body mass index (BMI) of 31.0 to 31.9 in adult, unspecified obesity type  Comments:  discussed sweets in diet    10. Hyperglycemia  Comments:  normal a1c today  Orders:  -     POC Glycosylated Hemoglobin (Hb A1C)    11. Primary osteoarthritis involving multiple joints  Comments:  coupons for otc voltaren gel provided    12. Influenza vaccination declined    13. Pneumococcal vaccination declined    14. At high risk for falls    15. Need for hepatitis C screening test  Comments:  [facility] labs x 1 year printed, negative Hep C and negative HIV      Follow Up:  Return in about 1 year (around 3/1/2022) for Medicare Wellness (366 days from last AWV).     An After Visit Summary and PPPS were given to the patient.       Patient has been erroneously marked as diabetic. Based on the available clinical information, she does not have diabetes and should therefore be excluded from diabetic health maintenance and quality measures for the remainder of the reporting period.

## 2021-02-26 NOTE — PATIENT INSTRUCTIONS
Fall Prevention in the Home, Adult  Falls can cause injuries and can affect people from all age groups. There are many simple things that you can do to make your home safe and to help prevent falls. Ask for help when making these changes, if needed.  What actions can I take to prevent falls?  General instructions  · Use good lighting in all rooms. Replace any light bulbs that burn out.  · Turn on lights if it is dark. Use night-lights.  · Place frequently used items in easy-to-reach places. Lower the shelves around your home if necessary.  · Set up furniture so that there are clear paths around it. Avoid moving your furniture around.  · Remove throw rugs and other tripping hazards from the floor.  · Avoid walking on wet floors.  · Fix any uneven floor surfaces.  · Add color or contrast paint or tape to grab bars and handrails in your home. Place contrasting color strips on the first and last steps of stairways.  · When you use a stepladder, make sure that it is completely opened and that the sides are firmly locked. Have someone hold the ladder while you are using it. Do not climb a closed stepladder.  · Be aware of any and all pets.  What can I do in the bathroom?         · Keep the floor dry. Immediately clean up any water that spills onto the floor.  · Remove soap buildup in the tub or shower on a regular basis.  · Use non-skid mats or decals on the floor of the tub or shower.  · Attach bath mats securely with double-sided, non-slip rug tape.  · If you need to sit down while you are in the shower, use a plastic, non-slip stool.  · Install grab bars by the toilet and in the tub and shower. Do not use towel bars as grab bars.  What can I do in the bedroom?  · Make sure that a bedside light is easy to reach.  · Do not use oversized bedding that drapes onto the floor.  · Have a firm chair that has side arms to use for getting dressed.  What can I do in the kitchen?  · Clean up any spills right away.  · If you  need to reach for something above you, use a sturdy step stool that has a grab bar.  · Keep electrical cables out of the way.  · Do not use floor polish or wax that makes floors slippery. If you must use wax, make sure that it is non-skid floor wax.  What can I do in the stairways?  · Do not leave any items on the stairs.  · Make sure that you have a light switch at the top of the stairs and the bottom of the stairs. Have them installed if you do not have them.  · Make sure that there are handrails on both sides of the stairs. Fix handrails that are broken or loose. Make sure that handrails are as long as the stairways.  · Install non-slip stair treads on all stairs in your home.  · Avoid having throw rugs at the top or bottom of stairways, or secure the rugs with carpet tape to prevent them from moving.  · Choose a carpet design that does not hide the edge of steps on the stairway.  · Check any carpeting to make sure that it is firmly attached to the stairs. Fix any carpet that is loose or worn.  What can I do on the outside of my home?  · Use bright outdoor lighting.  · Regularly repair the edges of walkways and driveways and fix any cracks.  · Remove high doorway thresholds.  · Trim any shrubbery on the main path into your home.  · Regularly check that handrails are securely fastened and in good repair. Both sides of any steps should have handrails.  · Install guardrails along the edges of any raised decks or porches.  · Clear walkways of debris and clutter, including tools and rocks.  · Have leaves, snow, and ice cleared regularly.  · Use sand or salt on walkways during winter months.  · In the garage, clean up any spills right away, including grease or oil spills.  What other actions can I take?  · Wear closed-toe shoes that fit well and support your feet. Wear shoes that have rubber soles or low heels.  · Use mobility aids as needed, such as canes, walkers, scooters, and crutches.  · Review your medicines with  your health care provider. Some medicines can cause dizziness or changes in blood pressure, which increase your risk of falling.  Talk with your health care provider about other ways that you can decrease your risk of falls. This may include working with a physical therapist or  to improve your strength, balance, and endurance.  Where to find more information  · Centers for Disease Control and Prevention, [place]: https://www.cdc.gov  · National [place] on Aging: https://gm3zlbs.quintin.nih.gov  Contact a health care provider if:  · You are afraid of falling at home.  · You feel weak, drowsy, or dizzy at home.  · You fall at home.  Summary  · There are many simple things that you can do to make your home safe and to help prevent falls.  · Ways to make your home safe include removing tripping hazards and installing grab bars in the bathroom.  · Ask for help when making these changes in your home.  This information is not intended to replace advice given to you by your health care provider. Make sure you discuss any questions you have with your health care provider.  Document Revised: 2018 Document Reviewed: 2018  [employer] Patient Education ©  [employer]      Medicare Wellness  Personal Prevention Plan of Service     Date of Office Visit:  2021  Encounter Provider:  Michelle Wynn MD  Place of Service:  [facility]  Patient Name: Pamela Herrera  :  1945    As part of the Medicare Wellness portion of your visit today, we are providing you with this personalized preventive plan of services (PPPS). This plan is based upon recommendations of the [place] Preventive Services Task Force (USPSTF) and the Advisory Committee on Immunization Practices (ACIP).    This lists the preventive care services that should be considered, and provides dates of when you are due. Items listed as completed are up-to-date and do not require any further  intervention.    Health Maintenance   Topic Date Due   • HEPATITIS C SCREENING  05/05/2016   • HEMOGLOBIN A1C  05/05/2016   • INFLUENZA VACCINE  03/31/2021 (Originally 8/1/2020)   • DIABETIC EYE EXAM  08/07/2021   • ANNUAL WELLNESS VISIT  02/26/2022   • COLONOSCOPY  04/13/2028   • TDAP/TD VACCINES (2 - Td) 10/11/2028   • MENINGOCOCCAL VACCINE  Aged Out   • Hepatitis B  Discontinued   • COVID-19 Vaccine  Discontinued   • Pneumococcal Vaccine 65+  Discontinued   • MAMMOGRAM  Discontinued   • URINE MICROALBUMIN  Discontinued   • DXA SCAN  Discontinued   • ZOSTER VACCINE  Discontinued       No orders of the defined types were placed in this encounter.      Return in about 1 year (around 3/1/2022) for Medicare Wellness (366 days from last AWV).

## 2021-03-02 RX ORDER — AMLODIPINE BESYLATE 10 MG/1
TABLET ORAL
Qty: 90 TABLET | Refills: 2 | OUTPATIENT
Start: 2021-03-02

## 2021-05-18 ENCOUNTER — TELEPHONE (OUTPATIENT)
Dept: INTERNAL MEDICINE | Facility: CLINIC | Age: 76
End: 2021-05-18

## 2021-05-18 DIAGNOSIS — R26.9 GAIT DISORDER: ICD-10-CM

## 2021-05-18 DIAGNOSIS — M15.9 PRIMARY OSTEOARTHRITIS INVOLVING MULTIPLE JOINTS: Primary | ICD-10-CM

## 2021-05-18 DIAGNOSIS — R53.1 WEAKNESS: ICD-10-CM

## 2021-05-18 DIAGNOSIS — M17.0 PRIMARY OSTEOARTHRITIS OF BOTH KNEES: ICD-10-CM

## 2021-05-18 DIAGNOSIS — I89.0 LYMPHEDEMA OF BOTH LOWER EXTREMITIES: ICD-10-CM

## 2021-05-18 DIAGNOSIS — Z91.81 AT HIGH RISK FOR FALLS: ICD-10-CM

## 2021-05-18 RX ORDER — CALCIUM CARBONATE 160(400)MG
TABLET,CHEWABLE ORAL
Qty: 1 EACH | Refills: 0 | Status: SHIPPED | OUTPATIENT
Start: 2021-05-18

## 2021-05-18 NOTE — TELEPHONE ENCOUNTER
PT CALLED TO REQUEST RX A ROLLING WALKER, PT STATED THAT MEDICARE WILL NOT PAY FOR IT UNLESS IT HAS BEEN OVER 5 YEARS    PLEASE ADVISE.  CALL BACK:[phone]

## 2021-05-18 NOTE — TELEPHONE ENCOUNTER
Spoke with Mamadou at [facility] and confirmed they did fill an order for a rollator for this pt September 2020.     Called the patient and advised she was given a new walker September 2020. Pt states she was given the walker then the company came and picked it up because Medicare would not pay for it yet.    Called [facility] back and spoke with Asmita. She does not see a  order, only shows the patient was given the walker. She also notes they never billed Medicare for the walker. Medicare was only billed in March 2016 by [facility]. Unsure why they did not bill Medicare.

## 2021-05-25 ENCOUNTER — TELEPHONE (OUTPATIENT)
Dept: INTERNAL MEDICINE | Facility: CLINIC | Age: 76
End: 2021-05-25

## 2021-05-27 DIAGNOSIS — M54.32 SCIATICA OF LEFT SIDE: ICD-10-CM

## 2021-05-27 RX ORDER — GABAPENTIN 100 MG/1
100 CAPSULE ORAL 3 TIMES DAILY
Qty: 270 CAPSULE | Refills: 2 | Status: SHIPPED | OUTPATIENT
Start: 2021-05-27 | End: 2021-12-21

## 2021-11-29 RX ORDER — CARVEDILOL 25 MG/1
TABLET ORAL
Qty: 180 TABLET | Refills: 0 | Status: SHIPPED | OUTPATIENT
Start: 2021-11-29 | End: 2022-03-04 | Stop reason: SDUPTHER

## 2021-12-21 DIAGNOSIS — M54.32 SCIATICA OF LEFT SIDE: ICD-10-CM

## 2021-12-21 RX ORDER — GABAPENTIN 100 MG/1
CAPSULE ORAL
Qty: 270 CAPSULE | Refills: 1 | Status: SHIPPED | OUTPATIENT
Start: 2021-12-21 | End: 2022-06-30

## 2021-12-28 ENCOUNTER — TELEPHONE (OUTPATIENT)
Dept: INTERNAL MEDICINE | Facility: CLINIC | Age: 76
End: 2021-12-28

## 2021-12-28 NOTE — TELEPHONE ENCOUNTER
Caller: Bianca Reynolds    Relationship: Emergency Contact    Best call back number: [phone]    What form or medical record are you requesting: FMLA / REJI  Who is requesting this form or medical record from you: DAUGHTER    How would you like to receive the form or medical records (pick-up, mail, fax): FAX  If fax, what is the fax number: [phone]    If mail, what is the address:   If pick-up, provide patient with address and location details    Timeframe paperwork needed: ASAP    Additional notes:

## 2022-03-04 ENCOUNTER — OFFICE VISIT (OUTPATIENT)
Dept: INTERNAL MEDICINE | Facility: CLINIC | Age: 77
End: 2022-03-04

## 2022-03-04 VITALS
DIASTOLIC BLOOD PRESSURE: 62 MMHG | OXYGEN SATURATION: 97 % | BODY MASS INDEX: 32.46 KG/M2 | HEART RATE: 72 BPM | WEIGHT: 176.4 LBS | RESPIRATION RATE: 16 BRPM | SYSTOLIC BLOOD PRESSURE: 136 MMHG | TEMPERATURE: 97.3 F | HEIGHT: 62 IN

## 2022-03-04 DIAGNOSIS — E66.09 CLASS 1 OBESITY DUE TO EXCESS CALORIES WITH SERIOUS COMORBIDITY AND BODY MASS INDEX (BMI) OF 32.0 TO 32.9 IN ADULT: ICD-10-CM

## 2022-03-04 DIAGNOSIS — I12.0 BENIGN HYPERTENSION WITH END-STAGE RENAL DISEASE: ICD-10-CM

## 2022-03-04 DIAGNOSIS — Z00.00 MEDICARE ANNUAL WELLNESS VISIT, SUBSEQUENT: Primary | ICD-10-CM

## 2022-03-04 DIAGNOSIS — N25.81 SECONDARY HYPERPARATHYROIDISM OF RENAL ORIGIN: ICD-10-CM

## 2022-03-04 DIAGNOSIS — N18.6 BENIGN HYPERTENSION WITH END-STAGE RENAL DISEASE: ICD-10-CM

## 2022-03-04 DIAGNOSIS — I11.0 HYPERTENSIVE HEART DISEASE WITH HEART FAILURE: ICD-10-CM

## 2022-03-04 DIAGNOSIS — I77.0 AV FISTULA: ICD-10-CM

## 2022-03-04 DIAGNOSIS — N18.6 ESRD (END STAGE RENAL DISEASE) ON DIALYSIS: ICD-10-CM

## 2022-03-04 DIAGNOSIS — E46 PROTEIN-CALORIE MALNUTRITION, UNSPECIFIED SEVERITY: ICD-10-CM

## 2022-03-04 DIAGNOSIS — Z99.2 ESRD (END STAGE RENAL DISEASE) ON DIALYSIS: ICD-10-CM

## 2022-03-04 DIAGNOSIS — Z91.81 AT HIGH RISK FOR FALLS: ICD-10-CM

## 2022-03-04 DIAGNOSIS — C54.1 ENDOMETRIAL CANCER: ICD-10-CM

## 2022-03-04 DIAGNOSIS — Z93.6 NEPHROSTOMY STATUS: ICD-10-CM

## 2022-03-04 PROCEDURE — 1125F AMNT PAIN NOTED PAIN PRSNT: CPT | Performed by: FAMILY MEDICINE

## 2022-03-04 PROCEDURE — 1159F MED LIST DOCD IN RCRD: CPT | Performed by: FAMILY MEDICINE

## 2022-03-04 PROCEDURE — 1170F FXNL STATUS ASSESSED: CPT | Performed by: FAMILY MEDICINE

## 2022-03-04 PROCEDURE — G0439 PPPS, SUBSEQ VISIT: HCPCS | Performed by: FAMILY MEDICINE

## 2022-03-04 RX ORDER — AMLODIPINE BESYLATE 10 MG/1
10 TABLET ORAL DAILY
Qty: 90 TABLET | Refills: 3 | Status: SHIPPED | OUTPATIENT
Start: 2022-03-04 | End: 2023-03-27

## 2022-03-04 RX ORDER — CARVEDILOL 25 MG/1
25 TABLET ORAL 2 TIMES DAILY
Qty: 180 TABLET | Refills: 3 | Status: SHIPPED | OUTPATIENT
Start: 2022-03-04

## 2022-03-04 NOTE — PATIENT INSTRUCTIONS
Fall Prevention in the Home, Adult  Falls can cause injuries and can affect people from all age groups. There are many simple things that you can do to make your home safe and to help prevent falls. Ask for help when making these changes, if needed.  What actions can I take to prevent falls?  General instructions  · Use good lighting in all rooms. Replace any light bulbs that burn out.  · Turn on lights if it is dark. Use night-lights.  · Place frequently used items in easy-to-reach places. Lower the shelves around your home if necessary.  · Set up furniture so that there are clear paths around it. Avoid moving your furniture around.  · Remove throw rugs and other tripping hazards from the floor.  · Avoid walking on wet floors.  · Fix any uneven floor surfaces.  · Add color or contrast paint or tape to grab bars and handrails in your home. Place contrasting color strips on the first and last steps of stairways.  · When you use a stepladder, make sure that it is completely opened and that the sides are firmly locked. Have someone hold the ladder while you are using it. Do not climb a closed stepladder.  · Be aware of any and all pets.  What can I do in the bathroom?         · Keep the floor dry. Immediately clean up any water that spills onto the floor.  · Remove soap buildup in the tub or shower on a regular basis.  · Use non-skid mats or decals on the floor of the tub or shower.  · Attach bath mats securely with double-sided, non-slip rug tape.  · If you need to sit down while you are in the shower, use a plastic, non-slip stool.  · Install grab bars by the toilet and in the tub and shower. Do not use towel bars as grab bars.  What can I do in the bedroom?  · Make sure that a bedside light is easy to reach.  · Do not use oversized bedding that drapes onto the floor.  · Have a firm chair that has side arms to use for getting dressed.  What can I do in the kitchen?  · Clean up any spills right away.  · If you  need to reach for something above you, use a sturdy step stool that has a grab bar.  · Keep electrical cables out of the way.  · Do not use floor polish or wax that makes floors slippery. If you must use wax, make sure that it is non-skid floor wax.  What can I do in the stairways?  · Do not leave any items on the stairs.  · Make sure that you have a light switch at the top of the stairs and the bottom of the stairs. Have them installed if you do not have them.  · Make sure that there are handrails on both sides of the stairs. Fix handrails that are broken or loose. Make sure that handrails are as long as the stairways.  · Install non-slip stair treads on all stairs in your home.  · Avoid having throw rugs at the top or bottom of stairways, or secure the rugs with carpet tape to prevent them from moving.  · Choose a carpet design that does not hide the edge of steps on the stairway.  · Check any carpeting to make sure that it is firmly attached to the stairs. Fix any carpet that is loose or worn.  What can I do on the outside of my home?  · Use bright outdoor lighting.  · Regularly repair the edges of walkways and driveways and fix any cracks.  · Remove high doorway thresholds.  · Trim any shrubbery on the main path into your home.  · Regularly check that handrails are securely fastened and in good repair. Both sides of any steps should have handrails.  · Install guardrails along the edges of any raised decks or porches.  · Clear walkways of debris and clutter, including tools and rocks.  · Have leaves, snow, and ice cleared regularly.  · Use sand or salt on walkways during winter months.  · In the garage, clean up any spills right away, including grease or oil spills.  What other actions can I take?  · Wear closed-toe shoes that fit well and support your feet. Wear shoes that have rubber soles or low heels.  · Use mobility aids as needed, such as canes, walkers, scooters, and crutches.  · Review your medicines with  your health care provider. Some medicines can cause dizziness or changes in blood pressure, which increase your risk of falling.  Talk with your health care provider about other ways that you can decrease your risk of falls. This may include working with a physical therapist or  to improve your strength, balance, and endurance.  Where to find more information  · Centers for Disease Control and Prevention, [place]: https://www.cdc.gov  · National [place] on Aging: https://yn2obqn.quintin.nih.gov  Contact a health care provider if:  · You are afraid of falling at home.  · You feel weak, drowsy, or dizzy at home.  · You fall at home.  Summary  · There are many simple things that you can do to make your home safe and to help prevent falls.  · Ways to make your home safe include removing tripping hazards and installing grab bars in the bathroom.  · Ask for help when making these changes in your home.  This information is not intended to replace advice given to you by your health care provider. Make sure you discuss any questions you have with your health care provider.  Document Revised: 2018 Document Reviewed: 2018  [employer] Patient Education ©  [employer] Inc.      Medicare Wellness  Personal Prevention Plan of Service     Date of Office Visit:  2022  Encounter Provider:  Michelle Wynn MD  Place of Service:  [facility]  Patient Name: Pamela Herrera  :  1945    As part of the Medicare Wellness portion of your visit today, we are providing you with this personalized preventive plan of services (PPPS). This plan is based upon recommendations of the [place] Preventive Services Task Force (USPSTF) and the Advisory Committee on Immunization Practices (ACIP).    This lists the preventive care services that should be considered, and provides dates of when you are due. Items listed as completed are up-to-date and do not require any further  intervention.    Health Maintenance   Topic Date Due   • ANNUAL WELLNESS VISIT  02/26/2022   • INFLUENZA VACCINE  03/31/2022 (Originally 8/1/2021)   • COLORECTAL CANCER SCREENING  10/15/2024   • TDAP/TD VACCINES (2 - Td or Tdap) 10/11/2028   • HEPATITIS C SCREENING  Completed   • Hepatitis B  Discontinued   • COVID-19 Vaccine  Discontinued   • Pneumococcal Vaccine 65+  Discontinued   • MAMMOGRAM  Discontinued   • DXA SCAN  Discontinued   • ZOSTER VACCINE  Discontinued       No orders of the defined types were placed in this encounter.      Return in about 1 year (around 3/7/2023).

## 2022-03-04 NOTE — PROGRESS NOTES
The ABCs of the Annual Wellness Visit  Subsequent Medicare Wellness Visit    Chief Complaint   Patient presents with   • Medicare Wellness-subsequent     annual wellness      Subjective    History of Present Illness:  Pamela Herrera is a 76 y.o. female who presents for a Subsequent Medicare Wellness Visit.    The following portions of the patient's history were reviewed and   updated as appropriate: allergies, current medications, past family history, past medical history, past social history, past surgical history and problem list.    Compared to one year ago, the patient feels her physical   health is the same.    Compared to one year ago, the patient feels her mental   health is the same.    Recent Hospitalizations:  She was not admitted to the hospital during the last year.       Current Medical Providers:  Patient Care Team:  Michelle Wynn MD as PCP - General (Family Medicine)  Ok Kunz MD as Surgeon (Orthopedic Surgery)  Nikko Morel MD as Consulting Physician (Nephrology)  Yomaira Santiago MD as Consulting Physician (Gynecologic Oncology)  Stefany Allen MD as Consulting Physician (Urology)    Outpatient Medications Prior to Visit   Medication Sig Dispense Refill   • acetaminophen (TYLENOL) 650 MG 8 hr tablet Take 650 mg by mouth Every 8 (Eight) Hours As Needed for Mild Pain .     • betamethasone valerate (VALISONE) 0.1 % cream betamethasone valerate 0.1 % topical cream   APPLY A THIN LAYER TO THE AFFECTED AREA(S) BY TOPICAL ROUTE ONCE DAILY X 7 DAYS THEN ONCE WEEKLY PRN     • CALCIUM PO Take  by mouth.     • COLLAGEN-VITAMIN C PO Take  by mouth.     • diclofenac (VOLTAREN) 1 % gel gel APPLY TO APPROPRIATE AREA AS DIRECTED 3 TIMES A  g 3   • fluticasone (FLONASE) 50 MCG/ACT nasal spray 1 spray into the nostril(s) as directed by provider Daily. 3 bottle 3   • gabapentin (NEURONTIN) 100 MG capsule TAKE 1 CAPSULE BY MOUTH THREE TIMES A  capsule 1   •  HYDROcodone-acetaminophen (NORCO) 5-325 MG per tablet Take 1 tablet by mouth Every 8 (Eight) Hours As Needed for Moderate Pain . 30 tablet 0   • Misc Natural Products (OSTEO BI-FLEX ADV JOINT SHIELD) tablet Take  by mouth.     • Misc. Devices (Rollator Ultra-Light) misc Use one walker to assist with mobility 1 each 0   • Prenatal Vit-Fe Fumarate-FA (PRENATAL PO) Take  by mouth.     • Probiotic Product (PROBIOTIC-10 PO) Take  by mouth.     • triamcinolone (KENALOG) 0.1 % cream Apply  topically 1 (One) Time Per Week. 45 g 5   • amLODIPine (NORVASC) 10 MG tablet Take 1 tablet by mouth Daily. Indications: High Blood Pressure Disorder 90 tablet 3   • carvedilol (COREG) 25 MG tablet TAKE 1 TABLET BY MOUTH 2 (TWO) TIMES A  tablet 0     No facility-administered medications prior to visit.       Opioid medication/s are on active medication list.  and I have evaluated her active treatment plan and pain score trends (see table).  Vitals:    03/04/22 0821   PainSc:   5   PainLoc: Finger     I have reviewed the chart for potential of high risk medication and harmful drug interactions in the elderly.            Aspirin is not on active medication list.  Aspirin use is not indicated based on review of current medical condition/s. Risk of harm outweighs potential benefits.  .    Patient Active Problem List   Diagnosis   • ESRD (end stage renal disease) on dialysis (HCC)   • Anemia, chronic renal failure, stage 5 ([facility])   • Benign hypertension with end-stage renal disease (HCC)   • Primary osteoarthritis involving multiple joints   • Endometrial cancer ([facility])   • Lymphedema of both lower extremities   • Nephrostomy status ([facility])   • Environmental and seasonal allergies   • AV fistula ([facility])   • Primary osteoarthritis of both knees   • Sciatica of left side   • Class 1 obesity with serious comorbidity and body mass index (BMI) of 31.0 to 31.9 in adult   • Influenza vaccination declined   • Pneumococcal vaccination declined   •  "Secondary hyperparathyroidism of renal origin (HCC)   • Ureteral stenosis     Advance Care Planning  Advance Directive is on file.  ACP discussion was held with the patient during this visit. Patient has an advance directive in EMR which is still valid.     Review of Systems   Constitutional: Negative for fever.   Musculoskeletal: Positive for arthralgias.        Objective    Vitals:    03/04/22 0821   BP: 136/62   BP Location: Right arm   Patient Position: Sitting   Cuff Size: Adult   Pulse: 72   Resp: 16   Temp: 97.3 °F (36.3 °C)   TempSrc: Temporal   SpO2: 97%   Weight: 80 kg (176 lb 6.4 oz)   Height: 157.5 cm (62\")   PainSc:   5   PainLoc: Finger     BMI Readings from Last 1 Encounters:   03/04/22 32.26 kg/m²   BMI is above normal parameters. Recommendations include: educational material    Does the patient have evidence of cognitive impairment? No    Physical Exam  Vitals and nursing note reviewed.   Constitutional:       General: She is not in acute distress.     Appearance: Normal appearance. She is well-developed and well-groomed. She is obese. She is not ill-appearing, toxic-appearing or diaphoretic.      Interventions: Face mask in place.      Comments: In wheelchair   HENT:      Head: Normocephalic and atraumatic.      Right Ear: Hearing, tympanic membrane, ear canal and external ear normal.      Left Ear: Hearing, tympanic membrane, ear canal and external ear normal.   Eyes:      General: Lids are normal. No scleral icterus.     Extraocular Movements: Extraocular movements intact.   Neck:      Trachea: Phonation normal.   Pulmonary:      Effort: Pulmonary effort is normal.   Musculoskeletal:      Cervical back: Neck supple.   Skin:     Coloration: Skin is not jaundiced or pale.   Neurological:      General: No focal deficit present.      Mental Status: She is alert and oriented to person, place, and time.      Motor: Motor function is intact.   Psychiatric:         Attention and Perception: Attention and " perception normal.         Mood and Affect: Mood and affect normal.         Speech: Speech normal.         Behavior: Behavior normal. Behavior is cooperative.         Thought Content: Thought content normal.         Cognition and Memory: Cognition and memory normal.         Judgment: Judgment normal.                 HEALTH RISK ASSESSMENT    Smoking Status:  Social History     Tobacco Use   Smoking Status Never Smoker   Smokeless Tobacco Never Used     Alcohol Consumption:  Social History     Substance and Sexual Activity   Alcohol Use No     Fall Risk Screen:    CIROADI Fall Risk Assessment was completed, and patient is at MODERATE risk for falls. Assessment completed on:3/4/2022    Depression Screening:  PHQ-2/PHQ-9 Depression Screening 3/4/2022   Little interest or pleasure in doing things 0   Feeling down, depressed, or hopeless 0   Total Score 0       Health Habits and Functional and Cognitive Screening:  Functional & Cognitive Status 3/4/2022   Do you have difficulty preparing food and eating? No   Do you have difficulty bathing yourself, getting dressed or grooming yourself? No   Do you have difficulty using the toilet? No   Do you have difficulty moving around from place to place? No   Do you have trouble with steps or getting out of a bed or a chair? Yes   Current Diet Well Balanced Diet   Dental Exam Up to date   Eye Exam Up to date   Exercise (times per week) 0 times per week   Current Exercises Include No Regular Exercise   Current Exercise Activities Include -   Do you need help using the phone?  No   Are you deaf or do you have serious difficulty hearing?  No   Do you need help with transportation? No   Do you need help shopping? Yes   Do you need help preparing meals?  No   Do you need help with housework?  No   Do you need help with laundry? No   Do you need help taking your medications? No   Do you need help managing money? No   Do you ever drive or ride in a car without wearing a seat belt? No    Have you felt unusual stress, anger or loneliness in the last month? No   Who do you live with? Child   If you need help, do you have trouble finding someone available to you? No   Have you been bothered in the last four weeks by sexual problems? No   Do you have difficulty concentrating, remembering or making decisions? Yes       Age-appropriate Screening Schedule:  Refer to the list below for future screening recommendations based on patient's age, sex and/or medical conditions. Orders for these recommended tests are listed in the plan section. The patient has been provided with a written plan.    Health Maintenance   Topic Date Due   • INFLUENZA VACCINE  03/31/2022 (Originally 8/1/2021)   • TDAP/TD VACCINES (2 - Td or Tdap) 10/11/2028   • MAMMOGRAM  Discontinued   • DXA SCAN  Discontinued   • ZOSTER VACCINE  Discontinued              Assessment/Plan   CMS Preventative Services Quick Reference  Risk Factors Identified During Encounter  Chronic Pain   Fall Risk-High or Moderate  Obesity/Overweight   Polypharmacy  The above risks/problems have been discussed with the patient.  Follow up actions/plans if indicated are seen below in the Assessment/Plan Section.  Pertinent information has been shared with the patient in the After Visit Summary.    Diagnoses and all orders for this visit:    1. Medicare annual wellness visit, subsequent (Primary)    2. Benign hypertension with end-stage renal disease (HCC)  Comments:  follow up nephrology  Orders:  -     amLODIPine (NORVASC) 10 MG tablet; Take 1 tablet by mouth Daily. Indications: High Blood Pressure Disorder  Dispense: 90 tablet; Refill: 3  -     carvedilol (COREG) 25 MG tablet; Take 1 tablet by mouth 2 (Two) Times a Day. Indications: High Blood Pressure Disorder  Dispense: 180 tablet; Refill: 3    3. ESRD (end stage renal disease) on dialysis (HCC)  Comments:  follow up with nephrology    4. Secondary hyperparathyroidism of renal origin ([facility])  Comments:  follow up  with nephrology    5. AV fistula (HCC)  Comments:  follow up with nephrology    6. Nephrostomy status (HCC)  Comments:  follow up with urology    7. Protein-calorie malnutrition, unspecified severity (HCC)  Comments:  cardiac renal diet    8. Hypertensive heart disease with heart failure (HCC)  Comments:  continue beta blocker    9. Endometrial cancer (HCC)  Comments:  follow up with specialists    10. Class 1 obesity due to excess calories with serious comorbidity and body mass index (BMI) of 32.0 to 32.9 in adult  Comments:  cardiac renal diet    11. At high risk for falls        Follow Up:   Return in about 1 year (around 3/7/2023) for Medicare Wellness (366 days from last AWV).     An After Visit Summary and PPPS were made available to the patient.

## 2022-06-30 DIAGNOSIS — M54.32 SCIATICA OF LEFT SIDE: ICD-10-CM

## 2022-06-30 RX ORDER — GABAPENTIN 100 MG/1
CAPSULE ORAL
Qty: 270 CAPSULE | Refills: 1 | Status: SHIPPED | OUTPATIENT
Start: 2022-06-30

## 2022-12-01 ENCOUNTER — OFFICE VISIT (OUTPATIENT)
Dept: INTERNAL MEDICINE | Facility: CLINIC | Age: 77
End: 2022-12-01

## 2022-12-01 ENCOUNTER — TELEPHONE (OUTPATIENT)
Dept: INTERNAL MEDICINE | Facility: CLINIC | Age: 77
End: 2022-12-01

## 2022-12-01 VITALS
SYSTOLIC BLOOD PRESSURE: 144 MMHG | BODY MASS INDEX: 32.39 KG/M2 | DIASTOLIC BLOOD PRESSURE: 76 MMHG | TEMPERATURE: 97.3 F | OXYGEN SATURATION: 98 % | HEART RATE: 81 BPM | WEIGHT: 176 LBS | HEIGHT: 62 IN | RESPIRATION RATE: 16 BRPM

## 2022-12-01 DIAGNOSIS — R68.89 FLU-LIKE SYMPTOMS: Primary | ICD-10-CM

## 2022-12-01 DIAGNOSIS — R05.1 ACUTE COUGH: ICD-10-CM

## 2022-12-01 LAB
EXPIRATION DATE: NORMAL
FLUAV AG UPPER RESP QL IA.RAPID: NOT DETECTED
FLUBV AG UPPER RESP QL IA.RAPID: NOT DETECTED
INTERNAL CONTROL: NORMAL
Lab: NORMAL
SARS-COV-2 AG UPPER RESP QL IA.RAPID: NOT DETECTED

## 2022-12-01 PROCEDURE — 99213 OFFICE O/P EST LOW 20 MIN: CPT | Performed by: FAMILY MEDICINE

## 2022-12-01 PROCEDURE — 87428 SARSCOV & INF VIR A&B AG IA: CPT | Performed by: FAMILY MEDICINE

## 2022-12-01 RX ORDER — DEXTROMETHORPHAN HYDROBROMIDE AND PROMETHAZINE HYDROCHLORIDE 15; 6.25 MG/5ML; MG/5ML
5 SYRUP ORAL NIGHTLY PRN
Qty: 118 ML | Refills: 0 | Status: SHIPPED | OUTPATIENT
Start: 2022-12-01

## 2022-12-01 NOTE — PROGRESS NOTES
"Chief Complaint  Cough, Shortness of Breath, and Wheezing    Subjective        Pamela Herrera presents to [facility]  History of Present Illness  Ill since yesterday  Cough somewhat productive  Some shortness of breath  Sweating  No measured fever  Exposed to covid-19 at dialysis.      Objective   Vital Signs:  /76   Pulse 81   Temp 97.3 °F (36.3 °C)   Resp 16   Ht 157.5 cm (62.01\")   Wt 79.8 kg (176 lb)   SpO2 98%   BMI 32.18 kg/m²   Estimated body mass index is 32.18 kg/m² as calculated from the following:    Height as of this encounter: 157.5 cm (62.01\").    Weight as of this encounter: 79.8 kg (176 lb).          Physical Exam  Vitals and nursing note reviewed.   Constitutional:       General: She is not in acute distress.     Appearance: Normal appearance. She is well-developed and well-groomed. She is obese. She is not ill-appearing, toxic-appearing or diaphoretic.      Interventions: Face mask in place.   HENT:      Head: Normocephalic and atraumatic.      Right Ear: Hearing normal.      Left Ear: Hearing normal.   Eyes:      General: Lids are normal. No scleral icterus.     Extraocular Movements: Extraocular movements intact.   Neck:      Trachea: Phonation normal.   Cardiovascular:      Rate and Rhythm: Normal rate and regular rhythm.   Pulmonary:      Effort: Pulmonary effort is normal.      Breath sounds: Normal breath sounds.   Musculoskeletal:      Cervical back: Neck supple.   Skin:     Coloration: Skin is not jaundiced or pale.   Neurological:      General: No focal deficit present.      Mental Status: She is alert and oriented to person, place, and time.      Motor: Motor function is intact.   Psychiatric:         Attention and Perception: Attention and perception normal.         Mood and Affect: Mood and affect normal.         Speech: Speech normal.         Behavior: Behavior normal. Behavior is cooperative.         Thought Content: Thought content normal.  "        Cognition and Memory: Cognition and memory normal.         Judgment: Judgment normal.        Result Review :  The following data was reviewed by: Michelle Wynn MD on 12/01/2022:  Office Visit on 12/01/2022   Component Date Value Ref Range Status   • SARS Antigen 12/01/2022 Not Detected  Not Detected, Presumptive Negative Final   • Influenza A Antigen LEODAN 12/01/2022 Not Detected  Not Detected Final   • Influenza B Antigen LEODAN 12/01/2022 Not Detected  Not Detected Final   • Internal Control 12/01/2022 Passed  Passed Final   • Lot Number 12/01/2022 [id number]   Final   • Expiration Date 12/01/2022 3/9/2023   Final                Assessment and Plan   Diagnoses and all orders for this visit:    1. Flu-like symptoms (Primary)  -     POCT SARS-CoV-2 Antigen LEODAN + Flu    2. Acute cough  -     promethazine-dextromethorphan (PROMETHAZINE-DM) 6.25-15 MG/5ML syrup; Take 5 mL by mouth At Night As Needed for Cough.  Dispense: 118 mL; Refill: 0    likely viral illness. History of C diff so we do not want to use antibiotics if not needed. Symptomatic treatment. Stay hydrated. Mucinex. Incentive spirometer/deep breathing exercises.          Follow Up   Return for As scheduled previously, Medicare Wellness.  Patient was given instructions and counseling regarding her condition or for health maintenance advice. Please see specific information pulled into the AVS if appropriate.

## 2022-12-01 NOTE — TELEPHONE ENCOUNTER
Caller: Bianca Reynolds    Relationship to patient: Emergency Contact    Best call back number: [phone]     Chief complaint: PRODUCTIVE COUGH, NOT FEELING GOOD SINCE LAST NIGHT    Type of visit: OFFICE    Requested date: TODAY

## 2022-12-01 NOTE — TELEPHONE ENCOUNTER
Dr. Wynn do you want to see patient or advise to go to [facility]? No openings with extenders.

## 2023-02-13 ENCOUNTER — TELEPHONE (OUTPATIENT)
Dept: INTERNAL MEDICINE | Facility: CLINIC | Age: 78
End: 2023-02-13
Payer: MEDICARE

## 2023-02-13 NOTE — TELEPHONE ENCOUNTER
Caller: Pamela Herrera    Relationship to patient: Self    Best call back number: [phone]    Chief complaint: NO CHIEF COMPLAINT     Type of visit:  SUBSEQUENT MEDICARE WELLNESS     Requested date: 03/27/2023     If rescheduling, when is the original appointment: 03/06/2023    Additional notes: THE PATIENT STATES SHE IS REQUESTING 03/27/2023 FOR HER MEDICARE WELLNESS VISIT, BUT THERE IS NO AVAILABILITY. THE PATIENT IS REQUESTING TO POSSIBLY COME ON 03/27/2023 FOR HER MWV. PLEASE CALL THE PATIENT AND ADVISE

## 2023-02-13 NOTE — TELEPHONE ENCOUNTER
Spoke with patient, offered appt with extender-patient declined, only wants to see PCP.  Next avail MWV in May 23.  Please advise.

## 2023-03-25 DIAGNOSIS — I12.0 BENIGN HYPERTENSION WITH END-STAGE RENAL DISEASE: ICD-10-CM

## 2023-03-25 DIAGNOSIS — N18.6 BENIGN HYPERTENSION WITH END-STAGE RENAL DISEASE: ICD-10-CM

## 2023-03-27 RX ORDER — AMLODIPINE BESYLATE 10 MG/1
TABLET ORAL
Qty: 90 TABLET | Refills: 3 | Status: SHIPPED | OUTPATIENT
Start: 2023-03-27

## 2023-03-27 NOTE — TELEPHONE ENCOUNTER
Rx Refill Note  Requested Prescriptions     Pending Prescriptions Disp Refills   • amLODIPine (NORVASC) 10 MG tablet [Pharmacy Med Name: AMLODIPINE BESYLATE 10 MG TAB] 90 tablet 3     Sig: TAKE 1 TABLET BY MOUTH DAILY FOR HIGH BLOOD PRESSURE      Last office visit with prescribing clinician: 12/1/2022   Last telemedicine visit with prescribing clinician:  Next office visit with prescribing clinician: 5/25/2023       Bri Clark MA  03/27/23, 10:19 EDT

## 2023-04-18 ENCOUNTER — TELEPHONE (OUTPATIENT)
Dept: INTERNAL MEDICINE | Facility: CLINIC | Age: 78
End: 2023-04-18
Payer: MEDICARE

## 2023-04-18 DIAGNOSIS — M54.32 SCIATICA OF LEFT SIDE: ICD-10-CM

## 2023-04-18 RX ORDER — GABAPENTIN 100 MG/1
CAPSULE ORAL
Qty: 270 CAPSULE | Refills: 0 | Status: SHIPPED | OUTPATIENT
Start: 2023-04-18

## 2023-04-18 NOTE — TELEPHONE ENCOUNTER
Caller: Pamela Herrera    Relationship: Self    Best call back number:     Requested Prescriptions:   Requested Prescriptions      No prescriptions requested or ordered in this encounter      gabapentin (NEURONTIN) 100 MG capsule    Pharmacy where request should be sent:      [facility]/pharmacy #[id number] - [place], [place] - [address] [place] [address] [phone] [facility] [phone] FX     Last office visit with prescribing clinician: 12/1/2022   Last telemedicine visit with prescribing clinician: 5/25/2023   Next office visit with prescribing clinician: 5/25/2023     Does the patient have less than a 3 day supply:    [x] Yes  [] No    Would you like a call back once the refill request has been completed: [] Yes [x] No    If the office needs to give you a call back, can they leave a voicemail: [] Yes [x] No    Mirna Owen, PCT   04/18/23 09:12 EDT

## 2023-04-18 NOTE — TELEPHONE ENCOUNTER
Rx Refill Note  Requested Prescriptions     Pending Prescriptions Disp Refills   • gabapentin (NEURONTIN) 100 MG capsule [Pharmacy Med Name: GABAPENTIN 100 MG CAPSULE] 270 capsule 0     Sig: TAKE 1 CAPSULE BY MOUTH THREE TIMES A DAY      Last office visit with prescribing clinician: 12/1/2022   Next office visit with prescribing clinician: 4/18/2023    UDS: not on file  CSA:  7/20/17    Seema Dailey MA  04/18/23, 09:22 EDT

## 2023-05-25 ENCOUNTER — OFFICE VISIT (OUTPATIENT)
Dept: INTERNAL MEDICINE | Facility: CLINIC | Age: 78
End: 2023-05-25
Payer: MEDICARE

## 2023-05-25 VITALS
HEIGHT: 62 IN | BODY MASS INDEX: 32.46 KG/M2 | RESPIRATION RATE: 16 BRPM | HEART RATE: 73 BPM | TEMPERATURE: 97.5 F | WEIGHT: 176.4 LBS | SYSTOLIC BLOOD PRESSURE: 124 MMHG | OXYGEN SATURATION: 96 % | DIASTOLIC BLOOD PRESSURE: 68 MMHG

## 2023-05-25 DIAGNOSIS — R23.8 SKIN BREAKDOWN: ICD-10-CM

## 2023-05-25 DIAGNOSIS — R53.1 WEAKNESS: ICD-10-CM

## 2023-05-25 DIAGNOSIS — R53.81 PHYSICAL DECONDITIONING: ICD-10-CM

## 2023-05-25 DIAGNOSIS — M25.611 DECREASED RANGE OF MOTION OF SHOULDER, RIGHT: ICD-10-CM

## 2023-05-25 DIAGNOSIS — Z00.00 MEDICARE ANNUAL WELLNESS VISIT, SUBSEQUENT: Primary | ICD-10-CM

## 2023-05-25 DIAGNOSIS — M54.32 SCIATICA OF LEFT SIDE: ICD-10-CM

## 2023-05-25 DIAGNOSIS — G89.29 CHRONIC LEFT SHOULDER PAIN: ICD-10-CM

## 2023-05-25 DIAGNOSIS — Z99.2 ESRD (END STAGE RENAL DISEASE) ON DIALYSIS: ICD-10-CM

## 2023-05-25 DIAGNOSIS — I12.0 BENIGN HYPERTENSION WITH END-STAGE RENAL DISEASE: ICD-10-CM

## 2023-05-25 DIAGNOSIS — G89.29 CHRONIC PAIN OF BOTH KNEES: ICD-10-CM

## 2023-05-25 DIAGNOSIS — M25.562 CHRONIC PAIN OF BOTH KNEES: ICD-10-CM

## 2023-05-25 DIAGNOSIS — I89.0 LYMPHEDEMA OF BOTH LOWER EXTREMITIES: ICD-10-CM

## 2023-05-25 DIAGNOSIS — M25.512 CHRONIC LEFT SHOULDER PAIN: ICD-10-CM

## 2023-05-25 DIAGNOSIS — M25.561 CHRONIC PAIN OF BOTH KNEES: ICD-10-CM

## 2023-05-25 DIAGNOSIS — Z28.21 PNEUMOCOCCAL VACCINATION DECLINED: ICD-10-CM

## 2023-05-25 DIAGNOSIS — R26.9 GAIT DIFFICULTY: ICD-10-CM

## 2023-05-25 DIAGNOSIS — Z91.81 AT HIGH RISK FOR FALLS: ICD-10-CM

## 2023-05-25 DIAGNOSIS — N18.6 ESRD (END STAGE RENAL DISEASE) ON DIALYSIS: ICD-10-CM

## 2023-05-25 DIAGNOSIS — N18.6 BENIGN HYPERTENSION WITH END-STAGE RENAL DISEASE: ICD-10-CM

## 2023-05-25 PROBLEM — E66.9 CLASS 1 OBESITY WITH SERIOUS COMORBIDITY AND BODY MASS INDEX (BMI) OF 31.0 TO 31.9 IN ADULT: Status: RESOLVED | Noted: 2019-01-28 | Resolved: 2023-05-25

## 2023-05-25 PROBLEM — E66.811 CLASS 1 OBESITY WITH SERIOUS COMORBIDITY AND BODY MASS INDEX (BMI) OF 31.0 TO 31.9 IN ADULT: Status: RESOLVED | Noted: 2019-01-28 | Resolved: 2023-05-25

## 2023-05-25 RX ORDER — CARVEDILOL 25 MG/1
25 TABLET ORAL 2 TIMES DAILY
Qty: 180 TABLET | Refills: 3 | Status: SHIPPED | OUTPATIENT
Start: 2023-05-25

## 2023-05-25 RX ORDER — AMLODIPINE BESYLATE 10 MG/1
10 TABLET ORAL DAILY
Qty: 90 TABLET | Refills: 3 | Status: SHIPPED | OUTPATIENT
Start: 2023-05-25

## 2023-05-25 RX ORDER — LIDOCAINE AND PRILOCAINE 25; 25 MG/G; MG/G
CREAM TOPICAL
COMMUNITY
Start: 2023-03-21

## 2023-05-25 NOTE — PROGRESS NOTES
The ABCs of the Annual Wellness Visit  Subsequent Medicare Wellness Visit    Subjective    Pamela Herrera is a 77 y.o. female who presents for a Subsequent Medicare Wellness Visit.    The following portions of the patient's history were reviewed and   updated as appropriate: allergies, current medications, past family history, past medical history, past social history, past surgical history and problem list.    Compared to one year ago, the patient feels her physical   health is the same.    Compared to one year ago, the patient feels her mental   health is the same.    Recent Hospitalizations:  She was not admitted to the hospital during the last year.       Current Medical Providers:  Patient Care Team:  Michelle Wynn MD as PCP - General (Family Medicine)  Ok Kunz MD as Surgeon (Orthopedic Surgery)  Nikko Morel MD, CYNTHIA as Consulting Physician (Nephrology)  Yomaira Santiago MD as Consulting Physician (Gynecologic Oncology)  Stefany Allen MD as Consulting Physician (Urology)    Outpatient Medications Prior to Visit   Medication Sig Dispense Refill   • acetaminophen (TYLENOL) 650 MG 8 hr tablet Take 1 tablet by mouth Every 8 (Eight) Hours As Needed for Mild Pain.     • betamethasone valerate (VALISONE) 0.1 % cream betamethasone valerate 0.1 % topical cream   APPLY A THIN LAYER TO THE AFFECTED AREA(S) BY TOPICAL ROUTE ONCE DAILY X 7 DAYS THEN ONCE WEEKLY PRN     • CALCIUM PO Take  by mouth.     • COLLAGEN-VITAMIN C PO Take  by mouth.     • diclofenac (VOLTAREN) 1 % gel gel APPLY TO APPROPRIATE AREA AS DIRECTED 3 TIMES A  g 3   • gabapentin (NEURONTIN) 100 MG capsule TAKE 1 CAPSULE BY MOUTH THREE TIMES A  capsule 0   • HYDROcodone-acetaminophen (NORCO) 5-325 MG per tablet Take 1 tablet by mouth Every 8 (Eight) Hours As Needed for Moderate Pain . 30 tablet 0   • lidocaine-prilocaine (EMLA) 2.5-2.5 % cream APPLY SMALL AMOUNT TO ACCESS SITE (AVF) 1 TO 2 HOURS BEFORE  DIALYSIS. COVER WITH OCCLUSIVE DRESSING (SARAN WRAP)     • Misc Natural Products (OSTEO BI-FLEX ADV JOINT SHIELD) tablet Take  by mouth.     • Prenatal Vit-Fe Fumarate-FA (PRENATAL PO) Take  by mouth.     • Probiotic Product (PROBIOTIC-10 PO) Take  by mouth.     • promethazine-dextromethorphan (PROMETHAZINE-DM) 6.25-15 MG/5ML syrup Take 5 mL by mouth At Night As Needed for Cough. 118 mL 0   • triamcinolone (KENALOG) 0.1 % cream Apply  topically 1 (One) Time Per Week. 45 g 5   • amLODIPine (NORVASC) 10 MG tablet TAKE 1 TABLET BY MOUTH DAILY FOR HIGH BLOOD PRESSURE 90 tablet 3   • carvedilol (COREG) 25 MG tablet Take 1 tablet by mouth 2 (Two) Times a Day. Indications: High Blood Pressure Disorder 180 tablet 3   • Misc. Devices (Rollator Ultra-Light) misc Use one walker to assist with mobility 1 each 0   • fluticasone (FLONASE) 50 MCG/ACT nasal spray 1 spray into the nostril(s) as directed by provider Daily. (Patient not taking: Reported on 5/25/2023) 3 bottle 3     No facility-administered medications prior to visit.       Opioid medication/s are on active medication list.  and I have evaluated her active treatment plan and pain score trends (see table).  Vitals:    05/25/23 0841   PainSc: 0-No pain     I have reviewed the chart for potential of high risk medication and harmful drug interactions in the elderly.            Aspirin is not on active medication list.  Aspirin use is not indicated based on review of current medical condition/s. Risk of harm outweighs potential benefits.  .    Patient Active Problem List   Diagnosis   • ESRD (end stage renal disease) on dialysis   • Anemia, chronic renal failure, stage 5   • Benign hypertension with end-stage renal disease   • Primary osteoarthritis involving multiple joints   • Endometrial cancer   • Lymphedema of both lower extremities   • Nephrostomy status   • Environmental and seasonal allergies   • AV fistula   • Primary osteoarthritis of both knees   • Sciatica of  "left side   • Influenza vaccination declined   • Pneumococcal vaccination declined   • Secondary hyperparathyroidism of renal origin   • Ureteral stenosis     Advance Care Planning   Advance Care Planning     Advance Directive is on file.  ACP discussion was held with the patient during this visit. Patient has an advance directive in EMR which is still valid.      Objective    Vitals:    23 0841   BP: 124/68   BP Location: Right arm   Patient Position: Sitting   Cuff Size: Adult   Pulse: 73   Resp: 16   Temp: 97.5 °F (36.4 °C)   TempSrc: Temporal   SpO2: 96%   Weight: 80 kg (176 lb 6.4 oz)   Height: 157.5 cm (62\")   PainSc: 0-No pain     Estimated body mass index is 32.26 kg/m² as calculated from the following:    Height as of this encounter: 157.5 cm (62\").    Weight as of this encounter: 80 kg (176 lb 6.4 oz).    BMI is >= 30 and <35. (Class 1 Obesity). The following options were offered after discussion;: weight loss educational material (shared in after visit summary)      Does the patient have evidence of cognitive impairment? No          HEALTH RISK ASSESSMENT    Smoking Status:  Social History     Tobacco Use   Smoking Status Never   Smokeless Tobacco Never     Alcohol Consumption:  Social History     Substance and Sexual Activity   Alcohol Use No     Fall Risk Screen:    STEADI Fall Risk Assessment was completed, and patient is at MODERATE risk for falls. Assessment completed on:2023    Depression Screenin/25/2023     8:40 AM   PHQ-2/PHQ-9 Depression Screening   Little Interest or Pleasure in Doing Things 0-->not at all   Feeling Down, Depressed or Hopeless 0-->not at all   PHQ-9: Brief Depression Severity Measure Score 0       Health Habits and Functional and Cognitive Screenin/25/2023     8:37 AM   Functional & Cognitive Status   Do you have difficulty preparing food and eating? No   Do you have difficulty bathing yourself, getting dressed or grooming yourself? No   Do you have " difficulty using the toilet? No   Do you have difficulty moving around from place to place? Yes   Do you have trouble with steps or getting out of a bed or a chair? Yes   Current Diet Well Balanced Diet   Dental Exam Up to date   Eye Exam Up to date   Exercise (times per week) 3 times per week   Current Exercises Include Other   Do you need help using the phone?  No   Are you deaf or do you have serious difficulty hearing?  No   Do you need help with transportation? Yes   Do you need help shopping? Yes   Do you need help preparing meals?  No   Do you need help with housework?  No   Do you need help with laundry? No   Do you need help taking your medications? No   Do you need help managing money? No   Do you ever drive or ride in a car without wearing a seat belt? No   Have you felt unusual stress, anger or loneliness in the last month? No   Who do you live with? Child   If you need help, do you have trouble finding someone available to you? No   Have you been bothered in the last four weeks by sexual problems? No   Do you have difficulty concentrating, remembering or making decisions? Yes       Age-appropriate Screening Schedule:  Refer to the list below for future screening recommendations based on patient's age, sex and/or medical conditions. Orders for these recommended tests are listed in the plan section. The patient has been provided with a written plan.    Health Maintenance   Topic Date Due   • INFLUENZA VACCINE  08/01/2023   • ANNUAL WELLNESS VISIT  05/25/2024   • TDAP/TD VACCINES (2 - Td or Tdap) 10/11/2028   • HEPATITIS C SCREENING  Completed   • Hepatitis B  Discontinued   • COVID-19 Vaccine  Discontinued   • Pneumococcal Vaccine 65+  Discontinued   • DXA SCAN  Discontinued   • ZOSTER VACCINE  Discontinued   • COLORECTAL CANCER SCREENING  Discontinued                  CMS Preventative Services Quick Reference  Risk Factors Identified During Encounter  Chronic Pain: meds as ordered  Immunizations  "Discussed/Encouraged: Influenza and Prevnar 20 (Pneumococcal 20-valent conjugate)  The above risks/problems have been discussed with the patient.  Pertinent information has been shared with the patient in the After Visit Summary.  An After Visit Summary and PPPS were made available to the patient.    Follow Up:   Next Medicare Wellness visit to be scheduled in 1 year.       Additional E&M Note during same encounter follows:  Patient has multiple medical problems which are significant and separately identifiable that require additional work above and beyond the Medicare Wellness Visit.      Chief Complaint  Medicare Wellness-subsequent (AWV and preventive care), Pain, and Edema    Subjective        Lymphedema pumps wearing out. Has had them for over six years. Wears on both legs. Failed conservative measures such as wraps, elevations. Right leg has been swelling more. Daughter is nurse trained in wound care, plans to wrap leg.    Chronic pain--gabapentin helps some. Takes bid. Has tid to take if needed.     Pamela Herrera is also being seen today for follow up of chronic conditions.         Objective   Vital Signs:  /68 (BP Location: Right arm, Patient Position: Sitting, Cuff Size: Adult)   Pulse 73   Temp 97.5 °F (36.4 °C) (Temporal)   Resp 16   Ht 157.5 cm (62\")   Wt 80 kg (176 lb 6.4 oz)   SpO2 96%   BMI 32.26 kg/m²     Physical Exam  Vitals and nursing note reviewed.   Constitutional:       General: She is not in acute distress.     Appearance: Normal appearance. She is well-developed and well-groomed. She is obese. She is ill-appearing (chronically). She is not toxic-appearing or diaphoretic.      Comments: Urine bags connected to lower extremities bilaterally. Urine in left bag darker color than right.    HENT:      Head: Normocephalic and atraumatic.      Right Ear: Hearing normal.      Left Ear: Hearing normal.   Eyes:      General: Lids are normal. No scleral icterus.     Extraocular Movements: " "Extraocular movements intact.   Neck:      Trachea: Phonation normal.   Cardiovascular:      Rate and Rhythm: Normal rate and regular rhythm.   Pulmonary:      Effort: Pulmonary effort is normal.      Breath sounds: Normal breath sounds and air entry.   Musculoskeletal:      Cervical back: Neck supple.      Right lower leg: 3+ Edema present.      Left lower le+ Edema present.   Skin:     Coloration: Skin is not jaundiced or pale.   Neurological:      General: No focal deficit present.      Mental Status: She is alert and oriented to person, place, and time.      Motor: Motor function is intact.      Gait: Gait abnormal.   Psychiatric:         Attention and Perception: Attention and perception normal.         Mood and Affect: Mood and affect normal.         Speech: Speech normal.         Behavior: Behavior normal. Behavior is cooperative.         Thought Content: Thought content normal.         Cognition and Memory: Cognition and memory normal.         Judgment: Judgment normal.                         Assessment and Plan   Diagnoses and all orders for this visit:    1. Medicare annual wellness visit, subsequent (Primary)    2. ESRD (end stage renal disease) on dialysis  Comments:  follow up with nephrology; stable; hd m/w/f    3. Sciatica of left side  Comments:  Mainly taking at night. Can refill by call in between visits.    4. Lymphedema of both lower extremities  -     Pneumatic Compressors    5. Skin breakdown  -     Skin Protectants, Misc. (InterDry 10\"x144\") sheet; Apply 1 application topically Daily As Needed (skin irritation).  Dispense: 10 each; Refill: 11    6. Weakness  -     Ambulatory Referral to Home Health    7. Chronic pain of both knees  -     Ambulatory Referral to Home Health    8. Decreased range of motion of shoulder, right  -     Ambulatory Referral to Home Health    9. Chronic left shoulder pain  -     Ambulatory Referral to Home Health    10. Gait difficulty  -     Ambulatory Referral to " Home Health    11. Physical deconditioning  -     Ambulatory Referral to Home Health    12. Benign hypertension with end-stage renal disease  Comments:  follow up nephrology  Orders:  -     amLODIPine (NORVASC) 10 MG tablet; Take 1 tablet by mouth Daily. for high blood pressure  Dispense: 90 tablet; Refill: 3  -     carvedilol (COREG) 25 MG tablet; Take 1 tablet by mouth 2 (Two) Times a Day. Indications: High Blood Pressure Disorder  Dispense: 180 tablet; Refill: 3    13. Pneumococcal vaccination declined    14. At high risk for falls  Comments:  info via avs    pt has utilized >4 weeks of conservative treatment including compression and elevation and exercise and symptoms persist. Has used lymphedema pumps for lower extremities x 6+ years with good results but current equipment is failing. New device requested.     Gabapentin-- daughter will let us know when refill needed where to send (mail order?)         Follow Up   Return in about 6 months (around 11/25/2023) for Controlled Rx Follow Up.  Patient was given instructions and counseling regarding her condition or for health maintenance advice. Please see specific information pulled into the AVS if appropriate.

## 2023-05-25 NOTE — PATIENT INSTRUCTIONS
Fall Prevention in the Home, Adult  Falls can cause injuries and affect people of all ages. There are many simple things that you can do to make your home safe and to help prevent falls. Ask for help when making these changes, if needed.  What actions can I take to prevent falls?  General instructions  Use good lighting in all rooms. Replace any light bulbs that burn out, turn on lights if it is dark, and use night-lights.  Place frequently used items in easy-to-reach places. Lower the shelves around your home if necessary.  Set up furniture so that there are clear paths around it. Avoid moving your furniture around.  Remove throw rugs and other tripping hazards from the floor.  Avoid walking on wet floors.  Fix any uneven floor surfaces.  Add color or contrast paint or tape to grab bars and handrails in your home. Place contrasting color strips on the first and last steps of staircases.  When you use a stepladder, make sure that it is completely opened and that the sides and supports are firmly locked. Have someone hold the ladder while you are using it. Do not climb a closed stepladder.  Know where your pets are when moving through your home.  What can I do in the bathroom?     Keep the floor dry. Immediately clean up any water that is on the floor.  Remove soap buildup in the tub or shower regularly.  Use nonskid mats or decals on the floor of the tub or shower.  Attach bath mats securely with double-sided, nonslip rug tape.  If you need to sit down while you are in the shower, use a plastic, nonslip stool.  Install grab bars by the toilet and in the tub and shower. Do not use towel bars as grab bars.  What can I do in the bedroom?  Make sure that a bedside light is easy to reach.  Do not use oversized bedding that reaches the floor.  Have a firm chair that has side arms to use for getting dressed.  What can I do in the kitchen?  Clean up any spills right away.  If you need to reach for something above you,  use a sturdy step stool that has a grab bar.  Keep electrical cables out of the way.  Do not use floor polish or wax that makes floors slippery. If you must use wax, make sure that it is non-skid floor wax.  What can I do with my stairs?  Do not leave any items on the stairs.  Make sure that you have a light switch at the top and the bottom of the stairs. Have them installed if you do not have them.  Make sure that there are handrails on both sides of the stairs. Fix handrails that are broken or loose. Make sure that handrails are as long as the staircases.  Install non-slip stair treads on all stairs in your home.  Avoid having throw rugs at the top or bottom of stairs, or secure the rugs with carpet tape to prevent them from moving.  Choose a carpet design that does not hide the edge of steps on the stairs.  Check any carpeting to make sure that it is firmly attached to the stairs. Fix any carpet that is loose or worn.  What can I do on the outside of my home?  Use bright outdoor lighting.  Regularly repair the edges of walkways and driveways and fix any cracks.  Remove high doorway thresholds.  Trim any shrubbery on the main path into your home.  Regularly check that handrails are securely fastened and in good repair. Both sides of all steps should have handrails.  Install guardrails along the edges of any raised decks or porches.  Clear walkways of debris and clutter, including tools and rocks.  Have leaves, snow, and ice cleared regularly.  Use sand or salt on walkways during winter months.  In the garage, clean up any spills right away, including grease or oil spills.  What other actions can I take?  Wear closed-toe shoes that fit well and support your feet. Wear shoes that have rubber soles or low heels.  Use mobility aids as needed, such as canes, walkers, scooters, and crutches.  Review your medicines with your health care provider. Some medicines can cause dizziness or changes in blood pressure, which  increase your risk of falling.  Talk with your health care provider about other ways that you can decrease your risk of falls. This may include working with a physical therapist or  to improve your strength, balance, and endurance.  Where to find more information  Centers for Disease Control and PreventionBHARATHI: www.cdc.gov  National Speculator on Aging: www.quintin.nih.gov  Contact a health care provider if:  You are afraid of falling at home.  You feel weak, drowsy, or dizzy at home.  You fall at home.  Summary  There are many simple things that you can do to make your home safe and to help prevent falls.  Ways to make your home safe include removing tripping hazards and installing grab bars in the bathroom.  Ask for help when making these changes in your home.  This information is not intended to replace advice given to you by your health care provider. Make sure you discuss any questions you have with your health care provider.  Document Revised: 09/19/2022 Document Reviewed: 07/21/2021  [employer] Patient Education © 2022 [employer]      Sit-to-Stand Exercise  The sit-to-stand exercise (also known as the chair stand or chair rise exercise) strengthens your lower body and helps you maintain or improve your mobility and independence. The end goal is to do the sit-to-stand exercise without using your hands. This will be easier as you become stronger. You should always talk with your health care provider before starting any exercise program, especially if you have had recent surgery.  Do the exercise exactly as told by your health care provider and adjust it as directed. It is normal to feel mild stretching, pulling, tightness, or discomfort as you do this exercise, but you should stop right away if you feel sudden pain or your pain gets worse. Do not begin doing this exercise until told by your health care provider.  What the sit-to-stand exercise does  The sit-to-stand exercise helps to strengthen the  muscles in your thighs and the muscles in the center of your body that give you stability (core muscles). This exercise is especially helpful if:  You have had knee or hip surgery.  You have trouble getting up from a chair, out of a car, or off the toilet due to muscle weakness.  How to do the sit-to-stand exercise  Sit toward the front edge of a sturdy chair without armrests. Your knees should be bent and your feet should be flat on the floor and shoulder-width apart and underneath your hips.  Place your hands lightly on each side of the seat. Keep your back and neck as straight as possible, with your chest slightly forward.  Breathe in slowly. Lean forward and slightly shift your weight to the front of your feet.  Breathe out as you slowly stand up. Try not to support any weight with your hands.  Stand and pause for a full breath in and out.  Breathe in as you sit down slowly. Tighten your core and abdominal muscles to control your lowering as much as possible. You should lower yourself back to the chair slowly, not just drop back into the seat.  Breathe out slowly.  Do this exercise 10-15 times. If needed, do it fewer times until you build up strength.  Rest for 1 minute, then do another set of 10-15 repetitions.  To change the difficulty of the sit-to-stand exercise  If the exercise is too difficult, use a chair with sturdy armrests, and push off the armrests to help you come to the standing position. You can also use the armrests to help slowly lower yourself back to sitting. As this gets easier, try to use your arms less. You can also place a firm cushion or pillow on the chair to make the surface higher.  If this exercise is too easy, do not use your arms to help raise or lower yourself. You can also wear a weighted vest, use hand weights, increase your repetitions, or try a lower chair.  General tips  You may feel tired when starting an exercise routine. This is normal.  You may have muscle soreness that  lasts a few days. This is normal. As you get stronger, you may not feel muscle soreness.  Use smooth, steady movements.  Do not  hold your breath during strength exercises. This can cause unsafe changes in your blood pressure.  Breathe in slowly through your nose, and breathe out slowly through your mouth.  Summary  Strengthening your lower body is an important step to help you move safely and independently.  The sit-to-stand exercise helps strengthen the muscles in your thighs and core.  You should always talk with your health care provider before starting any exercise program, especially if you have had recent surgery.  This information is not intended to replace advice given to you by your health care provider. Make sure you discuss any questions you have with your health care provider.  Document Revised: 04/10/2022 Document Reviewed: 04/10/2022  [employer] Patient Education © 2022 [employer]      Exercising to Stay Healthy  To become healthy and stay healthy, it is recommended that you do moderate-intensity and vigorous-intensity exercise. You can tell that you are exercising at a moderate intensity if your heart starts beating faster and you start breathing faster but can still hold a conversation. You can tell that you are exercising at a vigorous intensity if you are breathing much harder and faster and cannot hold a conversation while exercising.  How can exercise benefit me?  Exercising regularly is important. It has many health benefits, such as:  Improving overall fitness, flexibility, and endurance.  Increasing bone density.  Helping with weight control.  Decreasing body fat.  Increasing muscle strength and endurance.  Reducing stress and tension, anxiety, depression, or anger.  Improving overall health.  What guidelines should I follow while exercising?  Before you start a new exercise program, talk with your health care provider.  Do not exercise so much that you hurt yourself, feel dizzy, or get very  short of breath.  Wear comfortable clothes and wear shoes with good support.  Drink plenty of water while you exercise to prevent dehydration or heat stroke.  Work out until your breathing and your heartbeat get faster (moderate intensity).  How often should I exercise?  Choose an activity that you enjoy, and set realistic goals. Your health care provider can help you make an activity plan that is individually designed and works best for you.  Exercise regularly as told by your health care provider. This may include:  Doing strength training two times a week, such as:  Lifting weights.  Using resistance bands.  Push-ups.  Sit-ups.  Yoga.  Doing a certain intensity of exercise for a given amount of time. Choose from these options:  A total of 150 minutes of moderate-intensity exercise every week.  A total of 75 minutes of vigorous-intensity exercise every week.  A mix of moderate-intensity and vigorous-intensity exercise every week.  Children, pregnant women, people who have not exercised regularly, people who are overweight, and older adults may need to talk with a health care provider about what activities are safe to perform. If you have a medical condition, be sure to talk with your health care provider before you start a new exercise program.  What are some exercise ideas?  Moderate-intensity exercise ideas include:  Walking 1 mile (1.6 km) in about 15 minutes.  Biking.  Hiking.  Golfing.  Dancing.  Water aerobics.  Vigorous-intensity exercise ideas include:  Walking 4.5 miles (7.2 km) or more in about 1 hour.  Jogging or running 5 miles (8 km) in about 1 hour.  Biking 10 miles (16.1 km) or more in about 1 hour.  Lap swimming.  Roller-skating or in-line skating.  Cross-country skiing.  Vigorous competitive sports, such as football, basketball, and soccer.  Jumping rope.  Aerobic dancing.  What are some everyday activities that can help me get exercise?  Yard work, such as:  Pushing a .  Raking and  bagging leaves.  Washing your car.  Pushing a stroller.  Shoveling snow.  Gardening.  Washing windows or floors.  How can I be more active in my day-to-day activities?  Use stairs instead of an elevator.  Take a walk during your lunch break.  If you drive, park your car farther away from your work or school.  If you take public transportation, get off one stop early and walk the rest of the way.  Stand up or walk around during all of your indoor phone calls.  Get up, stretch, and walk around every 30 minutes throughout the day.  Enjoy exercise with a friend. Support to continue exercising will help you keep a regular routine of activity.  Where to find more information  You can find more information about exercising to stay healthy from:  U.S. Department of Health and Human Services: www.hhs.gov  Centers for Disease Control and Prevention (CDC): www.cdc.gov  Summary  Exercising regularly is important. It will improve your overall fitness, flexibility, and endurance.  Regular exercise will also improve your overall health. It can help you control your weight, reduce stress, and improve your bone density.  Do not exercise so much that you hurt yourself, feel dizzy, or get very short of breath.  Before you start a new exercise program, talk with your health care provider.  This information is not intended to replace advice given to you by your health care provider. Make sure you discuss any questions you have with your health care provider.  Document Revised: 04/15/2022 Document Reviewed: 04/15/2022  [employer] Patient Education ©  [employer]      Medicare Wellness  Personal Prevention Plan of Service     Date of Office Visit:    Encounter Provider:  Michelle Wynn MD  Place of Service:  [facility]  Patient Name: Pamela Herrera  :  1945    As part of the Medicare Wellness portion of your visit today, we are providing you with this personalized preventive plan of  services (PPPS). This plan is based upon recommendations of the [place] Preventive Services Task Force (USPSTF) and the Advisory Committee on Immunization Practices (ACIP).    This lists the preventive care services that should be considered, and provides dates of when you are due. Items listed as completed are up-to-date and do not require any further intervention.    Health Maintenance   Topic Date Due    ANNUAL WELLNESS VISIT  03/04/2023    INFLUENZA VACCINE  08/01/2023    TDAP/TD VACCINES (2 - Td or Tdap) 10/11/2028    HEPATITIS C SCREENING  Completed    Hepatitis B  Discontinued    COVID-19 Vaccine  Discontinued    Pneumococcal Vaccine 65+  Discontinued    DXA SCAN  Discontinued    ZOSTER VACCINE  Discontinued    COLORECTAL CANCER SCREENING  Discontinued       Orders Placed This Encounter   Procedures    Ambulatory Referral to Home Health     Referral Priority:   Routine     Referral Type:   Home Health     Referral Reason:   Specialty Services Required     Requested Specialty:   Home Health Services     Number of Visits Requested:   999       Return in about 6 months (around 11/25/2023) for Controlled Rx Follow Up.

## 2023-10-26 DIAGNOSIS — M54.32 SCIATICA OF LEFT SIDE: ICD-10-CM

## 2023-10-27 RX ORDER — GABAPENTIN 100 MG/1
CAPSULE ORAL
Qty: 270 CAPSULE | Refills: 0 | Status: SHIPPED | OUTPATIENT
Start: 2023-10-27

## 2023-10-27 NOTE — TELEPHONE ENCOUNTER
Rx Refill Note  Requested Prescriptions     Pending Prescriptions Disp Refills    gabapentin (NEURONTIN) 100 MG capsule [Pharmacy Med Name: GABAPENTIN 100 MG CAPSULE] 270 capsule 0     Sig: TAKE 1 CAPSULE BY MOUTH THREE TIMES A DAY      Last office visit with prescribing clinician: 5/25/2023   Last telemedicine visit with prescribing clinician: Visit date not found   Next office visit with prescribing clinician: 12/21/2023                         Would you like a call back once the refill request has been completed: [] Yes [] No    If the office needs to give you a call back, can they leave a voicemail: [] Yes [] No    Jose Kenyon MA  10/27/23, 10:35 EDT

## 2023-12-27 ENCOUNTER — OFFICE VISIT (OUTPATIENT)
Dept: INTERNAL MEDICINE | Facility: CLINIC | Age: 78
End: 2023-12-27
Payer: MEDICARE

## 2023-12-27 VITALS
OXYGEN SATURATION: 95 % | SYSTOLIC BLOOD PRESSURE: 148 MMHG | HEART RATE: 84 BPM | DIASTOLIC BLOOD PRESSURE: 72 MMHG | BODY MASS INDEX: 32.79 KG/M2 | HEIGHT: 62 IN | TEMPERATURE: 98.4 F | WEIGHT: 178.2 LBS

## 2023-12-27 DIAGNOSIS — N18.6 ESRD (END STAGE RENAL DISEASE) ON DIALYSIS: ICD-10-CM

## 2023-12-27 DIAGNOSIS — Z99.2 ESRD (END STAGE RENAL DISEASE) ON DIALYSIS: ICD-10-CM

## 2023-12-27 DIAGNOSIS — R05.1 ACUTE COUGH: Primary | ICD-10-CM

## 2023-12-27 LAB
EXPIRATION DATE: NORMAL
FLUAV AG NPH QL: NEGATIVE
FLUBV AG NPH QL: NEGATIVE
INTERNAL CONTROL: NORMAL
Lab: NORMAL
RSV AG SPEC QL: NEGATIVE
SARS-COV-2 AG UPPER RESP QL IA.RAPID: NOT DETECTED

## 2023-12-27 RX ORDER — LIDOCAINE AND PRILOCAINE 25; 25 MG/G; MG/G
CREAM TOPICAL
Qty: 30 G | Refills: 11 | Status: SHIPPED | OUTPATIENT
Start: 2023-12-27

## 2023-12-27 RX ORDER — HYDROCODONE BITARTRATE AND HOMATROPINE METHYLBROMIDE ORAL SOLUTION 5; 1.5 MG/5ML; MG/5ML
5 LIQUID ORAL NIGHTLY PRN
Qty: 120 ML | Refills: 0 | Status: SHIPPED | OUTPATIENT
Start: 2023-12-27

## 2023-12-27 NOTE — PROGRESS NOTES
"Chief Complaint  Cough, Nasal Congestion, and Generalized Body Aches (All symptoms started on Friday )    Subjective        Pamela Herrera presents to [facility]  History of Present Illness  Sick x 5 days  Cough   No shortness of breath  Sinus drainage but clear      Objective   Vital Signs:  /72 (BP Location: Right arm, Patient Position: Sitting, Cuff Size: Adult)   Pulse 84   Temp 98.4 °F (36.9 °C) (Temporal)   Ht 157.5 cm (62\")   Wt 80.8 kg (178 lb 3.2 oz)   SpO2 95%   BMI 32.59 kg/m²   Estimated body mass index is 32.59 kg/m² as calculated from the following:    Height as of this encounter: 157.5 cm (62\").    Weight as of this encounter: 80.8 kg (178 lb 3.2 oz).               Physical Exam  Vitals and nursing note reviewed.   Constitutional:       General: She is not in acute distress.     Appearance: Normal appearance. She is well-developed and well-groomed. She is obese. She is not ill-appearing, toxic-appearing or diaphoretic.      Interventions: Face mask in place.   HENT:      Head: Normocephalic and atraumatic.      Right Ear: Hearing normal.      Left Ear: Hearing normal.   Eyes:      General: Lids are normal. No scleral icterus.     Extraocular Movements: Extraocular movements intact.   Neck:      Trachea: Phonation normal.   Cardiovascular:      Rate and Rhythm: Normal rate and regular rhythm.   Pulmonary:      Effort: Pulmonary effort is normal.      Breath sounds: Normal breath sounds.   Musculoskeletal:      Cervical back: Neck supple.   Skin:     Coloration: Skin is not jaundiced or pale.   Neurological:      General: No focal deficit present.      Mental Status: She is alert and oriented to person, place, and time.      Motor: Motor function is intact.   Psychiatric:         Attention and Perception: Attention and perception normal.         Mood and Affect: Mood and affect normal.         Speech: Speech normal.         Behavior: Behavior normal. Behavior " is cooperative.         Thought Content: Thought content normal.         Cognition and Memory: Cognition and memory normal.         Judgment: Judgment normal.        Result Review :  The following data was reviewed by: Michelle Wynn MD on 12/27/2023:  BASIC METABOLIC PANEL (04/06/2023 09:06)     Office Visit on 12/27/2023   Component Date Value Ref Range Status    RSV Rapid Ag 12/27/2023 Negative  Negative Final    Expiration Date 12/27/2023 11/26/2025   Final    Lot Number 12/27/2023 [id number]   Final    Internal Control 12/27/2023 Passed  Passed Final    SARS Antigen 12/27/2023 Not Detected  Not Detected, Presumptive Negative Final    Internal Control 12/27/2023 Passed  Passed Final    Lot Number 12/27/2023 [id number]   Final    Expiration Date 12/27/2023 04/16/2024   Final    Rapid Influenza A Ag 12/27/2023 Negative  Negative Final    Rapid Influenza B Ag 12/27/2023 Negative  Negative Final    Internal Control 12/27/2023 Passed  Passed Final    Lot Number 12/27/2023 [id number]   Final    Expiration Date 12/27/2023 12/01/2025   Final                   Assessment and Plan   Diagnoses and all orders for this visit:    1. Acute cough (Primary)  -     POC RSV Screen  -     POCT SARS-CoV-2 Antigen LEODAN  -     POC Influenza A / B  -     HYDROcodone Bit-Homatrop MBr (HYCODAN) 5-1.5 MG/5ML solution; Take 5 mL by mouth At Night As Needed for Cough.  Dispense: 120 mL; Refill: 0    2. ESRD (end stage renal disease) on dialysis  -     lidocaine-prilocaine (EMLA) 2.5-2.5 % cream; APPLY SMALL AMOUNT TO ACCESS SITE (AVF) 1 TO 2 HOURS BEFORE DIALYSIS. COVER WITH OCCLUSIVE DRESSING (SARAN WRAP)  Dispense: 30 g; Refill: 11    If sinus symptoms worsen/persist and get to 10 days duration let me know will send antibiotic. Likely viral. Treat symptoms. Rest. Cough syrup sent for night time to help with sleep, needs to cough to expectorate during day. Cough syrup can be habit forming only use when needed. Refilled emla cream for  dialysis.          Follow Up   No follow-ups on file.  Patient was given instructions and counseling regarding her condition or for health maintenance advice. Please see specific information pulled into the AVS if appropriate.

## 2024-01-05 ENCOUNTER — PRIOR AUTHORIZATION (OUTPATIENT)
Dept: INTERNAL MEDICINE | Facility: CLINIC | Age: 79
End: 2024-01-05
Payer: MEDICARE

## 2024-01-05 NOTE — TELEPHONE ENCOUNTER
DANIEL ANAIS (Key: [id number])  Rx #: [id number]  Need Help? Call us at [phone]  Status  sent iconSent to Plan today  Drug  Lidocaine-Prilocaine 2.5-2.5% cream  ePA cloud logo  Form  [employer] Medicare Electronic PA Form (2017 NCPDP)  Original Claim Info  38,F2,671 DIAGNOSIS REQUIRED, MD CONTACT FOR PA.THIS PRODUCT MAY BE COVERED UNDER THE MEDICARE - B BUNDLED PAYMENT TO AN ESRD DIALYSIS FACILITYIF NOT FOR ESRD, REOBED ALBA; [facility] [phone](PHARMACY HELP DESK 1-

## 2024-01-08 NOTE — TELEPHONE ENCOUNTER
[facility] is not able to process this request through [facility], please contact the plan at [phone] or fax in request to [phone].

## 2024-01-08 NOTE — TELEPHONE ENCOUNTER
Called and discussed with patient she will talk with dialysis clinic to seek help. She will let us know.

## 2024-01-08 NOTE — TELEPHONE ENCOUNTER
Prescription insurance    [id number]    Silver script.     Must be billed through Medicare Part B  THE MEDICARE - B BUNDLED PAYMENT TO AN ESRD DIALYSIS FACILITYIF NOT FOR ESRD, HAYDE ALBA; [facility] CALL [phone]

## 2024-03-08 ENCOUNTER — PATIENT MESSAGE (OUTPATIENT)
Dept: INTERNAL MEDICINE | Facility: CLINIC | Age: 79
End: 2024-03-08
Payer: MEDICARE

## 2024-03-08 DIAGNOSIS — C54.1 ENDOMETRIAL CANCER: ICD-10-CM

## 2024-03-08 DIAGNOSIS — N93.9 ABNORMAL UTERINE BLEEDING (AUB): Primary | ICD-10-CM

## 2024-03-13 NOTE — TELEPHONE ENCOUNTER
"From: Pamela Herrera  To: Michelle Wynn  Sent: 3/8/2024 4:42 PM EST  Subject: Vaginal bleeding    Vaginal bleeding has increased from spotting to \"normal\" cycle bleeding daily. She reports filling the \"crotch area\" of a depends with blood daily. Has been increasing over the last couple of weeks. Has not seen Dr Santiago at [facility] in over 3 years. Her office says a referral is needed to return. Will you please fax a referral to Dr Yomaira Santiago [phone].   "

## 2024-03-19 ENCOUNTER — TRANSCRIBE ORDERS (OUTPATIENT)
Dept: ADMINISTRATIVE | Facility: HOSPITAL | Age: 79
End: 2024-03-19
Payer: MEDICARE

## 2024-03-19 DIAGNOSIS — Z85.42 HISTORY OF ENDOMETRIAL CANCER: ICD-10-CM

## 2024-03-19 DIAGNOSIS — N93.9 HEMORRHAGE IN UTERUS: ICD-10-CM

## 2024-03-19 DIAGNOSIS — N93.9 VAGINAL BLEEDING: Primary | ICD-10-CM

## 2024-03-19 DIAGNOSIS — Z85.42 PERSONAL HISTORY OF MALIGNANT NEOPLASM OF OTHER PARTS OF UTERUS: ICD-10-CM

## 2024-03-19 DIAGNOSIS — C54.1 ENDOMETRIAL CANCER: ICD-10-CM

## 2024-03-31 DIAGNOSIS — M54.32 SCIATICA OF LEFT SIDE: ICD-10-CM

## 2024-04-01 RX ORDER — GABAPENTIN 100 MG/1
CAPSULE ORAL
Qty: 270 CAPSULE | Refills: 0 | Status: SHIPPED | OUTPATIENT
Start: 2024-04-01

## 2024-04-01 NOTE — TELEPHONE ENCOUNTER
Rx Refill Note  Requested Prescriptions     Pending Prescriptions Disp Refills    gabapentin (NEURONTIN) 100 MG capsule [Pharmacy Med Name: GABAPENTIN 100 MG CAPSULE] 270 capsule 0     Sig: TAKE 1 CAPSULE BY MOUTH THREE TIMES A DAY      Last office visit with prescribing clinician: 12/27/2023   Next office visit with prescribing clinician: Visit date not found       Pura May MA  04/01/24, 07:59 EDT    UDS: NA  Message sent regarding scheduling annual wellness

## 2024-04-06 ENCOUNTER — READMISSION MANAGEMENT (OUTPATIENT)
Dept: CALL CENTER | Facility: HOSPITAL | Age: 79
End: 2024-04-06
Payer: MEDICARE

## 2024-04-06 NOTE — OUTREACH NOTE
Prep Survey      Flowsheet Row Responses   Congregational facility patient discharged from? Non-BH   Is LACE score < 7 ? Non-BH Discharge   Eligibility [facility]   Date of Admission 04/04/24   Date of Discharge 04/06/24   Discharge Disposition Home or Self Care   Discharge diagnosis Gross hematuria   Does the patient have one of the following disease processes/diagnoses(primary or secondary)? Other   Does the patient have Home health ordered? No   Is there a DME ordered? No   Prep survey completed? Yes            SKIP QUEEN - Registered Nurse

## 2024-04-08 ENCOUNTER — TRANSITIONAL CARE MANAGEMENT TELEPHONE ENCOUNTER (OUTPATIENT)
Dept: CALL CENTER | Facility: HOSPITAL | Age: 79
End: 2024-04-08
Payer: MEDICARE

## 2024-04-08 ENCOUNTER — TELEPHONE (OUTPATIENT)
Dept: INTERNAL MEDICINE | Facility: CLINIC | Age: 79
End: 2024-04-08
Payer: MEDICARE

## 2024-04-08 NOTE — TELEPHONE ENCOUNTER
Need to schedule a hospital f/u.  [facility] discharge on 4/6.  She has dialysis MWF from 11-4 so appointment on those days would have to be first thing in the morning or anytime on TR.  Please return call to daughter for scheduling.

## 2024-04-08 NOTE — OUTREACH NOTE
Call Center TCM Note      Flowsheet Row Responses   [facility] patient discharged from? Non-  [[facility]]   Does the patient have one of the following disease processes/diagnoses(primary or secondary)? Other   TCM attempt successful? Yes   Call start time 1240   Call end time 1244   General alerts for this patient ESRD on dialysis MWF   Discharge diagnosis acute blood loss anemia complicated by bilateral percutaneous nephrostomy exchange and left perinephric hematoma.   Is patient permission given to speak with other caregiver? Yes   List who call center can speak with Bianca Herrera Daughter   Person spoke with today (if not patient) and relationship Patient   Meds reviewed with patient/caregiver? Yes   Does the patient have all medications ordered at discharge? Yes   Is the patient taking all medications as directed (includes completed medication regime)? Yes   Comments PCP Dr Wynn. NO appts showing availabe for PCP HFU appt within 2 weeks of discharge. Office to please scheduled needed appt and contact Patient/daughter with appt info. Note patient has dialysis MWF. Message routed to office.   Does the patient have an appointment with their PCP within 7-14 days of discharge? No appointments available   Nursing Interventions Routed TCM call to PCP office, PCP office requested to make appointment - message sent   Has home health visited the patient within 72 hours of discharge? N/A   Psychosocial issues? No   Comments Patient reports daughter flushes nephrostomy tube as instructed.   Did the patient receive a copy of their discharge instructions? Yes   Nursing interventions Reviewed instructions with patient   What is the patient's perception of their health status since discharge? Same   Is the patient/caregiver able to teach back signs and symptoms related to disease process for when to call PCP? Yes   Is the patient/caregiver able to teach back signs and symptoms related to disease process for when to  call 911? Yes   Is the patient/caregiver able to teach back the hierarchy of who to call/visit for symptoms/problems? PCP, Specialist, Home health nurse, Urgent Care, ED, 911 Yes   If the patient is a current smoker, are they able to teach back resources for cessation? Not a smoker   TCM call completed? Yes   Call end time 1244   Would this patient benefit from a Referral to [facility] Social Work? No   Is the patient interested in additional calls from an ambulatory ? No            Lizbeth Cantrell RN    4/8/2024, 12:49 EDT

## 2024-04-16 ENCOUNTER — OFFICE VISIT (OUTPATIENT)
Dept: INTERNAL MEDICINE | Facility: CLINIC | Age: 79
End: 2024-04-16
Payer: MEDICARE

## 2024-04-16 VITALS
BODY MASS INDEX: 33.09 KG/M2 | DIASTOLIC BLOOD PRESSURE: 66 MMHG | HEIGHT: 62 IN | RESPIRATION RATE: 16 BRPM | SYSTOLIC BLOOD PRESSURE: 140 MMHG | TEMPERATURE: 97.6 F | HEART RATE: 71 BPM | WEIGHT: 179.8 LBS | OXYGEN SATURATION: 99 %

## 2024-04-16 DIAGNOSIS — Z09 HOSPITAL DISCHARGE FOLLOW-UP: ICD-10-CM

## 2024-04-16 DIAGNOSIS — Z85.42 HISTORY OF ENDOMETRIAL CANCER: ICD-10-CM

## 2024-04-16 DIAGNOSIS — D62 ACUTE BLOOD LOSS ANEMIA: Primary | ICD-10-CM

## 2024-04-16 DIAGNOSIS — R87.629 ABNORMAL PAP SMEAR OF VAGINA: ICD-10-CM

## 2024-04-16 DIAGNOSIS — Z91.81 AT HIGH RISK FOR FALLS: ICD-10-CM

## 2024-04-16 LAB
EXPIRATION DATE: ABNORMAL
HGB BLDA-MCNC: 8.8 G/DL (ref 12–17)
Lab: ABNORMAL

## 2024-04-16 PROCEDURE — 3078F DIAST BP <80 MM HG: CPT | Performed by: FAMILY MEDICINE

## 2024-04-16 PROCEDURE — 3077F SYST BP >= 140 MM HG: CPT | Performed by: FAMILY MEDICINE

## 2024-04-16 PROCEDURE — 1160F RVW MEDS BY RX/DR IN RCRD: CPT | Performed by: FAMILY MEDICINE

## 2024-04-16 PROCEDURE — 1159F MED LIST DOCD IN RCRD: CPT | Performed by: FAMILY MEDICINE

## 2024-04-16 PROCEDURE — 85018 HEMOGLOBIN: CPT | Performed by: FAMILY MEDICINE

## 2024-04-16 PROCEDURE — 1111F DSCHRG MED/CURRENT MED MERGE: CPT | Performed by: FAMILY MEDICINE

## 2024-04-16 PROCEDURE — 99495 TRANSJ CARE MGMT MOD F2F 14D: CPT | Performed by: FAMILY MEDICINE

## 2024-04-16 NOTE — PROGRESS NOTES
"Transitional Care Follow Up Visit  Subjective     Pamela Herrera is a 78 y.o. female who presents for a transitional care management visit.    Within 48 business hours after discharge our office contacted her via telephone to coordinate her care and needs.      I reviewed and discussed the details of that call along with the discharge summary, hospital problems, inpatient lab results, inpatient diagnostic studies, and consultation reports with Pamela.     Current outpatient and discharge medications have been reconciled for the patient.  Reviewed by: Michelle Wynn MD          4/6/2024     6:16 PM   Date of TCM Phone Call   Hospital [facility]   Date of Admission 4/4/2024   Date of Discharge 4/6/2024   Discharge Disposition Home or Self Care     Risk for Readmission (LACE) No data recorded    History of Present Illness  No further issues since discharge.  Here with daughter who provides supplemental history.  GÓMEZ work up was mentioned at  but patient has no desire to pursue.  Had a \"pap smear\" with specialist and now has PET scan scheduled.     Course During Hospital Stay:  reviewed discharge summary via Care Everywhere from , see info below:    Inpatient Discharge Summary    BRIEF OVERVIEW  Admitting Provider: Sharee Shah MD  Discharge Provider: Bharathi Vasques MD  Primary Care Physician at Discharge: Michelle Wynn MD     Admission Date: 4/4/2024 Discharge Date: 04/06/24     Primary Discharge Diagnosis  Traumatic perinephric hematoma, left, initial encounter     Secondary Discharge Diagnosis  Principal Problem:  Traumatic perinephric hematoma, left, initial encounter  Active Problems:  Endometrial adenocarcinoma (CMS/HCC)  Gross hematuria  ABLA (acute blood loss anemia)  Vaginal bleeding  Hypomagnesemia  Thrombocytopenia (CMS/HCC)    Discharge Disposition  Home   Discharge Condition: good    Outpatient Follow-Up  Future Appointments   Date Time Provider Department Center   [facility]   4/30/2024 8:30 AM [facility] PET [facility]   5/2/2024 8:45 AM Yomaira Santiago MD [facility]   8/6/2024 9:40 AM [facility]     DETAILS OF HOSPITAL STAY    Presenting Problem/History of Present Illness  Gross hematuria [R31.0]    Hospital Course  Pamela Herrera is a 78 y.o. female with ESRD on HD (MWF), Stage IV endometrial CA s/p ELODIA in 2011 and XRT for recurrence in 2014 c/b bilateral ureteral stenosis and hydronephrosis requiring bilateral PCN placement, HTN, and PUD w/ GI bleed post embolectomy (2018) presenting to the  ED on 04/04 around 2200 with bleeding from left PCNT tube. CTA showed a large left perinephric hematoma with active extravasation. She underwent angiogram with IR on 4/5 and was found to have no arterial bleed. She was deemed to have vascular bleed that would tamponade without intervention. She was admitted to hospital medicine for post-procedural monitoring of her acute blood loss anemia. She required transfusion of 1u PRBC post-procedurally, afterward her hemoglobin remained stable. At time of discharge her left nephrostomy tube continued to have some bloody drainage, this finding was discussed with interventional nephrology who reported that this will continue as the hematoma resolves. They recommended that the patient monitor for presence of bright red blood in the tube or for symptoms of acute blood loss (light-headedness, fatigue). They will follow up with her in two weeks as an outpatient. It was recommended that she followup with a primary care provider in the 3-5 days following discharge for potential repeat hemoglobin if deemed needed at that time.     Please see below for a problem based hospital course:     Acute blood loss anemia  Left Perinephric Hematoma   Arrived with report of bleeding from left PCNT. CTA (4/4) showed large perinephric hematoma extending to the retroperitoneum and pelvis,  with evidence of active contrast extravasation suggesting ongoing hemorrhage. Patient underwent diagnostic angiogram (4/5), which showed no evidence of arterial contrast extravasation; deemed venous in source and will likely tamponade without intervention. Diagnostic nephrostogram was also performed which demonstrated appropriate location of the nephrostomy tube without evidence of fistulization with vasculature. Patient required 1u PRBC on admission and 1 post-procedurally. Her hemoglobin remained stable for 24 hours s/p transfusion and patient requested discharge home. She was instructed to monitor for bright red blood within the nephrostomy tube and for symptoms of ongoing blood loss. It is expected that she will have some bloody drainage within the bag and drain until the hematoma resolves. She will followup with IR in two weeks, recommended sooner followup with PCP.     ESRD on Dialysis-Nephrology continued HD while inpatient     Primary Hypertension- Home medications restarted at discharge     Likely Obstructive Sleep Apnea- While inpatient patient observed to have obstructive apnea, desaturation while sleeping. She has a long history of snoring and apnea at home per patient and daughter. She would benefit from an outpatient sleep study for confirmation and treatment if needed; patient and daughter plan to discuss with patient's primary care physician.     Pamela Herrera is a 78 y.o. female with ESRD on HD (MWF), Stage IV endometrial CA s/p ELODIA in 2011 and XRT for recurrence in 2014 c/b bilateral ureteral stenosis and hydronephrosis requiring bilateral PCN placement, HTN, and PUD w/ GI bleed post embolectomy (2018) presenting to the  ED on 04/04 around 2200 with bleeding from left PCNT tube. CTA showed a large left perinephric hematoma with active extravasation. She underwent angiogram with IR on 4/5 and was found to have no arterial bleed. She was deemed to have vascular bleed that would tamponade without  intervention. She was admitted to hospital medicine for post-procedural monitoring of her acute blood loss anemia. She required transfusion of 1u PRBC post-procedurally, afterward her hemoglobin remained stable. At time of discharge her left nephrostomy tube continued to have some bloody drainage, this finding was discussed with interventional nephrology who reported that this will continue as the hematoma resolves. They recommended that the patient monitor for presence of bright red blood in the tube or for symptoms of acute blood loss (light-headedness, fatigue). They will follow up with her in two weeks as an outpatient. It was recommended that she followup with a primary care provider in the 3-5 days following discharge for potential repeat hemoglobin if deemed needed at that time.         The following portions of the patient's history were reviewed and updated as appropriate: allergies, current medications, past family history, past medical history, past social history, past surgical history, and problem list.    Review of Systems    Objective   Physical Exam  Vitals and nursing note reviewed.   Constitutional:       General: She is not in acute distress.     Appearance: Normal appearance. She is well-developed and well-groomed. She is not ill-appearing, toxic-appearing or diaphoretic.      Comments: In wheelchair   HENT:      Head: Normocephalic and atraumatic.      Right Ear: Hearing normal.      Left Ear: Hearing normal.   Eyes:      General: Lids are normal. No scleral icterus.     Extraocular Movements: Extraocular movements intact.   Neck:      Trachea: Phonation normal.   Pulmonary:      Effort: Pulmonary effort is normal.   Musculoskeletal:      Cervical back: Neck supple.   Skin:     Coloration: Skin is not jaundiced or pale.   Neurological:      General: No focal deficit present.      Mental Status: She is alert and oriented to person, place, and time.      Motor: Motor function is intact.    Psychiatric:         Attention and Perception: Attention and perception normal.         Mood and Affect: Mood and affect normal.         Speech: Speech normal.         Behavior: Behavior normal. Behavior is cooperative.         Thought Content: Thought content normal.         Cognition and Memory: Cognition and memory normal.         Judgment: Judgment normal.         Pap Test (03/28/2024 12:51) : atypical endometrial cells    Office Visit on 04/16/2024   Component Date Value Ref Range Status    Hemoglobin 04/16/2024 8.8 (A)  12.0 - 17.0 g/dL Final    Lot Number 04/16/2024 [id number]   Final    Expiration Date 04/16/2024 12/16/2025   Final      Hgb up from 8.5.    Assessment & Plan   Diagnoses and all orders for this visit:    1. Acute blood loss anemia (Primary)  -     POC Hemoglobin    2. Abnormal Pap smear of vagina    3. History of endometrial cancer    4. At high risk for falls  Comments:  info via avs    5. Hospital discharge follow-up        Discussed concerns for possible GÓMEZ but patient does not wish to pursue testing and has no interest in wearing CPAP or other devices at night. Will monitor. Follow up with urology as previously scheduled for chronic urological conditions. Reviewed via care everywhere recent pathology from vaginal testing, patient has pet scan scheduled.     Return for As scheduled previously.

## 2024-04-16 NOTE — PATIENT INSTRUCTIONS
Fall Prevention in the Home, Adult  Falls can cause injuries and affect people of all ages. There are many simple things that you can do to make your home safe and to help prevent falls.  If you need it, ask for help making these changes.  What actions can I take to prevent falls?  General information  Use good lighting in all rooms. Make sure to:  Replace any light bulbs that burn out.  Turn on lights if it is dark and use night-lights.  Keep items that you use often in easy-to-reach places. Lower the shelves around your home if needed.  Move furniture so that there are clear paths around it.  Do not keep throw rugs or other things on the floor that can make you trip.  If any of your floors are uneven, fix them.  Add color or contrast paint or tape to clearly arsenio and help you see:  Grab bars or handrails.  First and last steps of staircases.  Where the edge of each step is.  If you use a ladder or stepladder:  Make sure that it is fully opened. Do not climb a closed ladder.  Make sure the sides of the ladder are locked in place.  Have someone hold the ladder while you use it.  Know where your pets are as you move through your home.  What can I do in the bathroom?         Keep the floor dry. Clean up any water that is on the floor right away.  Remove soap buildup in the bathtub or shower. Buildup makes bathtubs and showers slippery.  Use non-skid mats or decals on the floor of the bathtub or shower.  Attach bath mats securely with double-sided, non-slip rug tape.  If you need to sit down while you are in the shower, use a non-slip stool.  Install grab bars by the toilet and in the bathtub and shower. Do not use towel bars as grab bars.  What can I do in the bedroom?  Make sure that you have a light by your bed that is easy to reach.  Do not use any sheets or blankets on your bed that hang to the floor.  Have a firm bench or chair with side arms that you can use for support when you get dressed.  What can I do in  the kitchen?  Clean up any spills right away.  If you need to reach something above you, use a sturdy step stool that has a grab bar.  Keep electrical cables out of the way.  Do not use floor polish or wax that makes floors slippery.  What can I do with my stairs?  Do not leave anything on the stairs.  Make sure that you have a light switch at the top and the bottom of the stairs. Have them installed if you do not have them.  Make sure that there are handrails on both sides of the stairs. Fix handrails that are broken or loose. Make sure that handrails are as long as the staircases.  Install non-slip stair treads on all stairs in your home if they do not have carpet.  Avoid having throw rugs at the top or bottom of stairs, or secure the rugs with carpet tape to prevent them from moving.  Choose a carpet design that does not hide the edge of steps on the stairs. Make sure that carpet is firmly attached to the stairs. Fix any carpet that is loose or worn.  What can I do on the outside of my home?  Use bright outdoor lighting.  Repair the edges of walkways and driveways and fix any cracks. Clear paths of anything that can make you trip, such as tools or rocks.  Add color or contrast paint or tape to clearly arsenio and help you see high doorway thresholds.  Trim any bushes or trees on the main path into your home.  Check that handrails are securely fastened and in good repair. Both sides of all steps should have handrails.  Install guardrails along the edges of any raised decks or porches.  Have leaves, snow, and ice cleared regularly. Use sand, salt, or ice melt on walkways during winter months if you live where there is ice and snow.  In the garage, clean up any spills right away, including grease or oil spills.  What other actions can I take?  Review your medicines with your health care provider. Some medicines can make you confused or feel dizzy. This can increase your chance of falling.  Wear closed-toe shoes that  fit well and support your feet. Wear shoes that have rubber soles and low heels.  Use a cane, walker, scooter, or crutches that help you move around if needed.  Talk with your provider about other ways that you can decrease your risk of falls. This may include seeing a physical therapist to learn to do exercises to improve movement and strength.  Where to find more information  Centers for Disease Control and Prevention, [place]: cdc.gov  [place] on Aging: quint[place].nih.gov  [place] on Aging: [place].nih.gov  Contact a health care provider if:  You are afraid of falling at home.  You feel weak, drowsy, or dizzy at home.  You fall at home.  Get help right away if you:  Lose consciousness or have trouble moving after a fall.  Have a fall that causes a head injury.  These symptoms may be an emergency. Get help right away. Call 911.  Do not wait to see if the symptoms will go away.  Do not drive yourself to the hospital.  This information is not intended to replace advice given to you by your health care provider. Make sure you discuss any questions you have with your health care provider.  Document Revised: 08/21/2023 Document Reviewed: 08/21/2023  [employer] Patient Education © 2023 [employer]    Sit-to-Stand Exercise    The sit-to-stand exercise (also known as the chair stand or chair rise exercise) strengthens your lower body and helps you maintain or improve your mobility and independence. The end goal is to do the sit-to-stand exercise without using your hands. This will be easier as you become stronger. You should always talk with your health care provider before starting any exercise program, especially if you have had recent surgery.  Do the exercise exactly as told by your health care provider and adjust it as directed. It is normal to feel mild stretching, pulling, tightness, or discomfort as you do this exercise, but you should stop right away if you feel sudden pain or your pain gets worse. Do  not begin doing this exercise until told by your health care provider.  What the sit-to-stand exercise does  The sit-to-stand exercise helps to strengthen the muscles in your thighs and the muscles in the center of your body that give you stability (core muscles). This exercise is especially helpful if:  You have had knee or hip surgery.  You have trouble getting up from a chair, out of a car, or off the toilet due to muscle weakness.  How to do the sit-to-stand exercise  Sit toward the front edge of a sturdy chair without armrests. Your knees should be bent and your feet should be flat on the floor and shoulder-width apart and underneath your hips.  Place your hands lightly on each side of the seat. Keep your back and neck as straight as possible, with your chest slightly forward.  Breathe in slowly. Lean forward and slightly shift your weight to the front of your feet.  Breathe out as you slowly stand up. Try not to support any weight with your hands.  Stand and pause for a full breath in and out.  Breathe in as you sit down slowly. Tighten your core and abdominal muscles to control your lowering as much as possible. You should lower yourself back to the chair slowly, not just drop back into the seat.  Breathe out slowly.  Do this exercise 10-15 times. If needed, do it fewer times until you build up strength.  Rest for 1 minute, then do another set of 10-15 repetitions.  To change the difficulty of the sit-to-stand exercise  If the exercise is too difficult, use a chair with sturdy armrests, and push off the armrests to help you come to the standing position. You can also use the armrests to help slowly lower yourself back to sitting. As this gets easier, try to use your arms less. You can also place a firm cushion or pillow on the chair to make the surface higher.  If this exercise is too easy, do not use your arms to help raise or lower yourself. You can also wear a weighted vest, use hand weights, increase your  repetitions, or try a lower chair.  General tips  You may feel tired when starting an exercise routine. This is normal.  You may have muscle soreness that lasts a few days. This is normal. As you get stronger, you may not feel muscle soreness.  Use smooth, steady movements.  Do not  hold your breath during strength exercises. This can cause unsafe changes in your blood pressure.  Breathe in slowly through your nose, and breathe out slowly through your mouth.  Summary  Strengthening your lower body is an important step to help you move safely and independently.  The sit-to-stand exercise helps strengthen the muscles in your thighs and core.  You should always talk with your health care provider before starting any exercise program, especially if you have had recent surgery.  This information is not intended to replace advice given to you by your health care provider. Make sure you discuss any questions you have with your health care provider.  Document Revised: 04/10/2022 Document Reviewed: 04/10/2022  [employer] Patient Education © 2023 [employer]    Exercising to Stay Healthy  To become healthy and stay healthy, it is recommended that you do moderate-intensity and vigorous-intensity exercise. You can tell that you are exercising at a moderate intensity if your heart starts beating faster and you start breathing faster but can still hold a conversation. You can tell that you are exercising at a vigorous intensity if you are breathing much harder and faster and cannot hold a conversation while exercising.  How can exercise benefit me?  Exercising regularly is important. It has many health benefits, such as:  Improving overall fitness, flexibility, and endurance.  Increasing bone density.  Helping with weight control.  Decreasing body fat.  Increasing muscle strength and endurance.  Reducing stress and tension, anxiety, depression, or anger.  Improving overall health.  What guidelines should I follow while  exercising?  Before you start a new exercise program, talk with your health care provider.  Do not exercise so much that you hurt yourself, feel dizzy, or get very short of breath.  Wear comfortable clothes and wear shoes with good support.  Drink plenty of water while you exercise to prevent dehydration or heat stroke.  Work out until your breathing and your heartbeat get faster (moderate intensity).  How often should I exercise?  Choose an activity that you enjoy, and set realistic goals. Your health care provider can help you make an activity plan that is individually designed and works best for you.  Exercise regularly as told by your health care provider. This may include:  Doing strength training two times a week, such as:  Lifting weights.  Using resistance bands.  Push-ups.  Sit-ups.  Yoga.  Doing a certain intensity of exercise for a given amount of time. Choose from these options:  A total of 150 minutes of moderate-intensity exercise every week.  A total of 75 minutes of vigorous-intensity exercise every week.  A mix of moderate-intensity and vigorous-intensity exercise every week.  Children, pregnant women, people who have not exercised regularly, people who are overweight, and older adults may need to talk with a health care provider about what activities are safe to perform. If you have a medical condition, be sure to talk with your health care provider before you start a new exercise program.  What are some exercise ideas?  Moderate-intensity exercise ideas include:  Walking 1 mile (1.6 km) in about 15 minutes.  Biking.  Hiking.  Golfing.  Dancing.  Water aerobics.  Vigorous-intensity exercise ideas include:  Walking 4.5 miles (7.2 km) or more in about 1 hour.  Jogging or running 5 miles (8 km) in about 1 hour.  Biking 10 miles (16.1 km) or more in about 1 hour.  Lap swimming.  Roller-skating or in-line skating.  Cross-country skiing.  Vigorous competitive sports, such as football, basketball, and  soccer.  Jumping rope.  Aerobic dancing.  What are some everyday activities that can help me get exercise?  Yard work, such as:  Pushing a .  Raking and bagging leaves.  Washing your car.  Pushing a stroller.  Shoveling snow.  Gardening.  Washing windows or floors.  How can I be more active in my day-to-day activities?  Use stairs instead of an elevator.  Take a walk during your lunch break.  If you drive, park your car farther away from your work or school.  If you take public transportation, get off one stop early and walk the rest of the way.  Stand up or walk around during all of your indoor phone calls.  Get up, stretch, and walk around every 30 minutes throughout the day.  Enjoy exercise with a friend. Support to continue exercising will help you keep a regular routine of activity.  Where to find more information  You can find more information about exercising to stay healthy from:  [place] Department of Health and Human Services: www.hhs.gov  Centers for Disease Control and Prevention (CDC): www.cdc.gov  Summary  Exercising regularly is important. It will improve your overall fitness, flexibility, and endurance.  Regular exercise will also improve your overall health. It can help you control your weight, reduce stress, and improve your bone density.  Do not exercise so much that you hurt yourself, feel dizzy, or get very short of breath.  Before you start a new exercise program, talk with your health care provider.  This information is not intended to replace advice given to you by your health care provider. Make sure you discuss any questions you have with your health care provider.  Document Revised: 04/15/2022 Document Reviewed: 04/15/2022  [employer] Patient Education © 2023 [employer]

## 2024-06-11 ENCOUNTER — OFFICE VISIT (OUTPATIENT)
Dept: INTERNAL MEDICINE | Facility: CLINIC | Age: 79
End: 2024-06-11
Payer: MEDICARE

## 2024-06-11 VITALS
OXYGEN SATURATION: 98 % | RESPIRATION RATE: 16 BRPM | DIASTOLIC BLOOD PRESSURE: 60 MMHG | WEIGHT: 176.8 LBS | TEMPERATURE: 97 F | HEART RATE: 71 BPM | SYSTOLIC BLOOD PRESSURE: 134 MMHG | HEIGHT: 62 IN | BODY MASS INDEX: 32.54 KG/M2

## 2024-06-11 DIAGNOSIS — M25.561 CHRONIC PAIN OF BOTH KNEES: ICD-10-CM

## 2024-06-11 DIAGNOSIS — Z99.2 ESRD (END STAGE RENAL DISEASE) ON DIALYSIS: ICD-10-CM

## 2024-06-11 DIAGNOSIS — I12.0 BENIGN HYPERTENSION WITH END-STAGE RENAL DISEASE: ICD-10-CM

## 2024-06-11 DIAGNOSIS — Z28.21 PNEUMOCOCCAL VACCINATION DECLINED: ICD-10-CM

## 2024-06-11 DIAGNOSIS — M54.32 SCIATICA OF LEFT SIDE: ICD-10-CM

## 2024-06-11 DIAGNOSIS — N18.6 BENIGN HYPERTENSION WITH END-STAGE RENAL DISEASE: ICD-10-CM

## 2024-06-11 DIAGNOSIS — M25.562 CHRONIC PAIN OF BOTH KNEES: ICD-10-CM

## 2024-06-11 DIAGNOSIS — N18.6 ESRD (END STAGE RENAL DISEASE) ON DIALYSIS: ICD-10-CM

## 2024-06-11 DIAGNOSIS — Z00.00 MEDICARE ANNUAL WELLNESS VISIT, SUBSEQUENT: Primary | ICD-10-CM

## 2024-06-11 DIAGNOSIS — G89.29 CHRONIC PAIN OF BOTH KNEES: ICD-10-CM

## 2024-06-11 DIAGNOSIS — C54.1 ENDOMETRIAL CANCER: ICD-10-CM

## 2024-06-11 PROCEDURE — 1159F MED LIST DOCD IN RCRD: CPT | Performed by: FAMILY MEDICINE

## 2024-06-11 PROCEDURE — G0439 PPPS, SUBSEQ VISIT: HCPCS | Performed by: FAMILY MEDICINE

## 2024-06-11 PROCEDURE — 1125F AMNT PAIN NOTED PAIN PRSNT: CPT | Performed by: FAMILY MEDICINE

## 2024-06-11 PROCEDURE — 1170F FXNL STATUS ASSESSED: CPT | Performed by: FAMILY MEDICINE

## 2024-06-11 PROCEDURE — 3078F DIAST BP <80 MM HG: CPT | Performed by: FAMILY MEDICINE

## 2024-06-11 PROCEDURE — 99213 OFFICE O/P EST LOW 20 MIN: CPT | Performed by: FAMILY MEDICINE

## 2024-06-11 PROCEDURE — 3075F SYST BP GE 130 - 139MM HG: CPT | Performed by: FAMILY MEDICINE

## 2024-06-11 PROCEDURE — 1160F RVW MEDS BY RX/DR IN RCRD: CPT | Performed by: FAMILY MEDICINE

## 2024-06-11 RX ORDER — GABAPENTIN 100 MG/1
100 CAPSULE ORAL 3 TIMES DAILY
Qty: 270 CAPSULE | Refills: 1 | Status: SHIPPED | OUTPATIENT
Start: 2024-06-11

## 2024-06-11 RX ORDER — HYDROCODONE BITARTRATE AND ACETAMINOPHEN 5; 325 MG/1; MG/1
1 TABLET ORAL EVERY 12 HOURS PRN
Qty: 30 TABLET | Refills: 0 | Status: SHIPPED | OUTPATIENT
Start: 2024-06-11 | End: 2024-06-14

## 2024-06-11 RX ORDER — AMLODIPINE BESYLATE 10 MG/1
10 TABLET ORAL DAILY
Qty: 90 TABLET | Refills: 3 | Status: SHIPPED | OUTPATIENT
Start: 2024-06-11

## 2024-06-11 RX ORDER — CARVEDILOL 25 MG/1
25 TABLET ORAL 2 TIMES DAILY
Qty: 180 TABLET | Refills: 3 | Status: SHIPPED | OUTPATIENT
Start: 2024-06-11

## 2024-06-11 NOTE — PATIENT INSTRUCTIONS
Medicare Wellness  Personal Prevention Plan of Service     Date of Office Visit:    Encounter Provider:  Michelle Wynn MD  Place of Service:  [facility]  Patient Name: Pamela Herrera  :  1945    As part of the Medicare Wellness portion of your visit today, we are providing you with this personalized preventive plan of services (PPPS). This plan is based upon recommendations of the [place] Preventive Services Task Force (USPSTF) and the Advisory Committee on Immunization Practices (ACIP).    This lists the preventive care services that should be considered, and provides dates of when you are due. Items listed as completed are up-to-date and do not require any further intervention.    Health Maintenance   Topic Date Due    ANNUAL WELLNESS VISIT  2024    BMI FOLLOWUP  2025    TDAP/TD VACCINES (2 - Td or Tdap) 10/11/2028    Hepatitis B  Discontinued    COVID-19 Vaccine  Discontinued    RSV Vaccine - Adults  Discontinued    INFLUENZA VACCINE  Discontinued    Pneumococcal Vaccine 65+  Discontinued    DXA SCAN  Discontinued    ZOSTER VACCINE  Discontinued    COLORECTAL CANCER SCREENING  Discontinued       No orders of the defined types were placed in this encounter.      Return in about 1 year (around 2025) for Medicare Wellness, sooner if needed.        Health Maintenance After Age 65    After age 65, you are at a higher risk for certain long-term diseases and infections as well as injuries from falls. Falls are a major cause of broken bones and head injuries in people who are older than age 65. Getting regular preventive care can help to keep you healthy and well. Preventive care includes getting regular testing and making lifestyle changes as recommended by your health care provider. Talk with your health care provider about:  Which screenings and tests you should have. A screening is a test that checks for a disease when you have no symptoms.  A  diet and exercise plan that is right for you.    What should I know about screenings and tests to prevent falls?  Screening and testing are the best ways to find a health problem early. Early diagnosis and treatment give you the best chance of managing medical conditions that are common after age 65. Certain conditions and lifestyle choices may make you more likely to have a fall. Your health care provider may recommend:  Regular vision checks. Poor vision and conditions such as cataracts can make you more likely to have a fall. If you wear glasses, make sure to get your prescription updated if your vision changes.  Medicine review. Work with your health care provider to regularly review all of the medicines you are taking, including over-the-counter medicines. Ask your health care provider about any side effects that may make you more likely to have a fall. Tell your health care provider if any medicines that you take make you feel dizzy or sleepy.  Strength and balance checks. Your health care provider may recommend certain tests to check your strength and balance while standing, walking, or changing positions.  Foot health exam. Foot pain and numbness, as well as not wearing proper footwear, can make you more likely to have a fall.  Screenings, including:  Osteoporosis screening. Osteoporosis is a condition that causes the bones to get weaker and break more easily.  Blood pressure screening. Blood pressure changes and medicines to control blood pressure can make you feel dizzy.  Depression screening. You may be more likely to have a fall if you have a fear of falling, feel depressed, or feel unable to do activities that you used to do.  Alcohol use screening. Using too much alcohol can affect your balance and may make you more likely to have a fall.    Follow these instructions at home:  Lifestyle  Do not drink alcohol if:  Your health care provider tells you not to drink.  If you drink alcohol:  Limit how much  you have to:  0-1 drink a day for women.  0-2 drinks a day for men.  Know how much alcohol is in your drink. In the [place] one drink equals one 12 oz bottle of beer (355 mL), one 5 oz glass of wine (148 mL), or one 1½ oz glass of hard liquor (44 mL).  Do not use any products that contain nicotine or tobacco. These products include cigarettes, chewing tobacco, and vaping devices, such as e-cigarettes. If you need help quitting, ask your health care provider.    Activity    Follow a regular exercise program to stay fit. This will help you maintain your balance. Ask your health care provider what types of exercise are appropriate for you.  If you need a cane or walker, use it as recommended by your health care provider.  Wear supportive shoes that have nonskid soles.  Safety    Remove any tripping hazards, such as rugs, cords, and clutter.  Install safety equipment such as grab bars in bathrooms and safety rails on stairs.  Keep rooms and walkways well-lit.    General instructions  Talk with your health care provider about your risks for falling. Tell your health care provider if:  You fall. Be sure to tell your health care provider about all falls, even ones that seem minor.  You feel dizzy, tiredness (fatigue), or off-balance.  Take over-the-counter and prescription medicines only as told by your health care provider. These include supplements.  Eat a healthy diet and maintain a healthy weight. A healthy diet includes low-fat dairy products, low-fat (lean) meats, and fiber from whole grains, beans, and lots of fruits and vegetables.  Stay current with your vaccines.  Schedule regular health, dental, and eye exams.    Summary  Having a healthy lifestyle and getting preventive care can help to protect your health and wellness after age 65.  Screening and testing are the best way to find a health problem early and help you avoid having a fall. Early diagnosis and treatment give you the best chance for managing medical  conditions that are more common for people who are older than age 65.  Falls are a major cause of broken bones and head injuries in people who are older than age 65. Take precautions to prevent a fall at home.  Work with your health care provider to learn what changes you can make to improve your health and wellness and to prevent falls.    This information is not intended to replace advice given to you by your health care provider. Make sure you discuss any questions you have with your health care provider.  Document Revised: 05/09/2022 Document Reviewed: 05/09/2022  [employer] Patient Education © 2023 [employer]  Updated 2/29/24 tc

## 2024-06-11 NOTE — PROGRESS NOTES
The ABCs of the Annual Wellness Visit  Subsequent Medicare Wellness Visit    Subjective    Pamela Herrera is a 78 y.o. female who presents for a Subsequent Medicare Wellness Visit.    The following portions of the patient's history were reviewed and   updated as appropriate: allergies, current medications, past family history, past medical history, past social history, past surgical history, and problem list.    Compared to one year ago, the patient feels her physical   health is the same.    Compared to one year ago, the patient feels her mental   health is the same.    Recent Hospitalizations:  She was admitted within the past 365 days at [email or web address] (April 2024).       Current Medical Providers:  Patient Care Team:  Michelle Wynn MD as PCP - General (Family Medicine)  Ok Kunz MD as Surgeon (Orthopedic Surgery)  Nikko Morel MD, CYNTHIA as Consulting Physician (Nephrology)  Yomaira Santiago MD as Consulting Physician (Gynecologic Oncology)  Stefany Allen MD as Consulting Physician (Urology)    Outpatient Medications Prior to Visit   Medication Sig Dispense Refill    acetaminophen (TYLENOL) 650 MG 8 hr tablet Take 1 tablet by mouth Every 8 (Eight) Hours As Needed for Mild Pain.      betamethasone valerate (VALISONE) 0.1 % cream betamethasone valerate 0.1 % topical cream   APPLY A THIN LAYER TO THE AFFECTED AREA(S) BY TOPICAL ROUTE ONCE DAILY X 7 DAYS THEN ONCE WEEKLY PRN      CALCIUM PO Take  by mouth.      COLLAGEN-VITAMIN C PO Take  by mouth.      diclofenac (VOLTAREN) 1 % gel gel APPLY TO APPROPRIATE AREA AS DIRECTED 3 TIMES A  g 3    fluticasone (FLONASE) 50 MCG/ACT nasal spray 1 spray into the nostril(s) as directed by provider Daily. 3 bottle 3    lidocaine-prilocaine (EMLA) 2.5-2.5 % cream APPLY SMALL AMOUNT TO ACCESS SITE (AVF) 1 TO 2 HOURS BEFORE DIALYSIS. COVER WITH OCCLUSIVE DRESSING (SARAN WRAP)      lidocaine-prilocaine (EMLA) 2.5-2.5 % cream APPLY SMALL AMOUNT  "TO ACCESS SITE (AVF) 1 TO 2 HOURS BEFORE DIALYSIS. COVER WITH OCCLUSIVE DRESSING (SARAN WRAP) 30 g 11    Misc Natural Products (OSTEO BI-FLEX ADV JOINT SHIELD) tablet Take  by mouth.      Prenatal Vit-Fe Fumarate-FA (PRENATAL PO) Take  by mouth.      Probiotic Product (PROBIOTIC-10 PO) Take  by mouth.      Skin Protectants, Misc. (InterDry 10\"x144\") sheet Apply 1 application topically Daily As Needed (skin irritation). 10 each 11    amLODIPine (NORVASC) 10 MG tablet Take 1 tablet by mouth Daily. for high blood pressure 90 tablet 3    carvedilol (COREG) 25 MG tablet Take 1 tablet by mouth 2 (Two) Times a Day. Indications: High Blood Pressure Disorder 180 tablet 3    gabapentin (NEURONTIN) 100 MG capsule TAKE 1 CAPSULE BY MOUTH THREE TIMES A  capsule 0    HYDROcodone-acetaminophen (NORCO) 5-325 MG per tablet Take 1 tablet by mouth Every 8 (Eight) Hours As Needed for Moderate Pain . 30 tablet 0    HYDROcodone Bit-Homatrop MBr (HYCODAN) 5-1.5 MG/5ML solution Take 5 mL by mouth At Night As Needed for Cough. 120 mL 0    promethazine-dextromethorphan (PROMETHAZINE-DM) 6.25-15 MG/5ML syrup Take 5 mL by mouth At Night As Needed for Cough. 118 mL 0    triamcinolone (KENALOG) 0.1 % cream Apply  topically 1 (One) Time Per Week. 45 g 5     No facility-administered medications prior to visit.       Opioid medication/s are on active medication list.  and I have evaluated her active treatment plan and pain score trends (see table).  Vitals:    06/11/24 0937   PainSc:   6   PainLoc: Knee     I have reviewed the chart for potential of high risk medication and harmful drug interactions in the elderly.          Aspirin is not on active medication list.  Aspirin use is not indicated based on review of current medical condition/s. Risk of harm outweighs potential benefits.  .    Patient Active Problem List   Diagnosis    ESRD (end stage renal disease) on dialysis    Anemia, chronic renal failure, stage 5    Benign hypertension " "with end-stage renal disease    Primary osteoarthritis involving multiple joints    Endometrial cancer    Lymphedema of both lower extremities    Nephrostomy status    Environmental and seasonal allergies    AV fistula    Primary osteoarthritis of both knees    Sciatica of left side    Influenza vaccination declined    Pneumococcal vaccination declined    Secondary hyperparathyroidism of renal origin    Ureteral stenosis     Advance Care Planning   Advance Care Planning     Advance Directive is on file.  ACP discussion was held with the patient during this visit. Patient has an advance directive in EMR which is still valid.      Objective    Vitals:    24 0937   BP: 134/60   BP Location: Right arm   Patient Position: Sitting   Cuff Size: Adult   Pulse: 71   Resp: 16   Temp: 97 °F (36.1 °C)   TempSrc: Temporal   SpO2: 98%   Weight: 80.2 kg (176 lb 12.8 oz)   Height: 157.5 cm (62\")   PainSc:   6   PainLoc: Knee     Estimated body mass index is 32.34 kg/m² as calculated from the following:    Height as of this encounter: 157.5 cm (62\").    Weight as of this encounter: 80.2 kg (176 lb 12.8 oz).    BMI is >= 30 and <35. (Class 1 Obesity). The following options were offered after discussion;: weight loss educational material (shared in after visit summary)      Does the patient have evidence of cognitive impairment? No          HEALTH RISK ASSESSMENT    Smoking Status:  Social History     Tobacco Use   Smoking Status Never   Smokeless Tobacco Never     Alcohol Consumption:  Social History     Substance and Sexual Activity   Alcohol Use Never     Fall Risk Screen:    CIROADI Fall Risk Assessment was completed, and patient is at LOW risk for falls.Assessment completed on:2024    Depression Screenin/11/2024     9:35 AM   PHQ-2/PHQ-9 Depression Screening   Little Interest or Pleasure in Doing Things 0-->not at all   Feeling Down, Depressed or Hopeless 0-->not at all   PHQ-9: Brief Depression Severity " Measure Score 0       Health Habits and Functional and Cognitive Screenin/11/2024     9:32 AM   Functional & Cognitive Status   Do you have difficulty preparing food and eating? No   Do you have difficulty bathing yourself, getting dressed or grooming yourself? No   Do you have difficulty using the toilet? No   Do you have difficulty moving around from place to place? Yes   Do you have trouble with steps or getting out of a bed or a chair? Yes   Current Diet Well Balanced Diet   Dental Exam Up to date   Eye Exam Up to date   Exercise (times per week) 0 times per week   Current Exercises Include No Regular Exercise   Do you need help using the phone?  No   Are you deaf or do you have serious difficulty hearing?  No   Do you need help to go to places out of walking distance? Yes   Do you need help shopping? Yes   Do you need help preparing meals?  No   Do you need help with housework?  No   Do you need help with laundry? No   Do you need help taking your medications? No   Do you need help managing money? No   Do you ever drive or ride in a car without wearing a seat belt? No   Have you felt unusual stress, anger or loneliness in the last month? Yes   Who do you live with? Child   If you need help, do you have trouble finding someone available to you? No   Have you been bothered in the last four weeks by sexual problems? No   Do you have difficulty concentrating, remembering or making decisions? No       Age-appropriate Screening Schedule:  Refer to the list below for future screening recommendations based on patient's age, sex and/or medical conditions. Orders for these recommended tests are listed in the plan section. The patient has been provided with a written plan.    Health Maintenance   Topic Date Due    ANNUAL WELLNESS VISIT  2024    BMI FOLLOWUP  2025    TDAP/TD VACCINES (2 - Td or Tdap) 10/11/2028    Hepatitis B  Discontinued    COVID-19 Vaccine  Discontinued    RSV Vaccine - Adults   "Discontinued    INFLUENZA VACCINE  Discontinued    Pneumococcal Vaccine 65+  Discontinued    DXA SCAN  Discontinued    ZOSTER VACCINE  Discontinued    COLORECTAL CANCER SCREENING  Discontinued                  CMS Preventative Services Quick Reference  Risk Factors Identified During Encounter  Chronic Pain:  meds per a/p, cancer  The above risks/problems have been discussed with the patient.  Pertinent information has been shared with the patient in the After Visit Summary.  An After Visit Summary and PPPS were made available to the patient.    Follow Up:   Next Medicare Wellness visit to be scheduled in 1 year.       Additional E&M Note during same encounter follows:  Patient has multiple medical problems which are significant and separately identifiable that require additional work above and beyond the Medicare Wellness Visit.      Chief Complaint  Medicare Wellness-subsequent (AWV and preventive care) and Cancer (Recurrence of endometrial)    Subjective        Here with daughter who helps with care  Has had nephrostomy tubes approx 10 years now  Recurrence of endometrial cancer per gyn/onc  Has repeat pet scan later this summer  Takes pain med sparingly (Norco last refilled 2017)  Gabapentin continues to help with neuropathy  Continues dialysis 3x weekly for ESRD  Declines vaccinations      Pamela Herrera is also being seen today for follow up of chronic conditions.         Objective   Vital Signs:  /60 (BP Location: Right arm, Patient Position: Sitting, Cuff Size: Adult)   Pulse 71   Temp 97 °F (36.1 °C) (Temporal)   Resp 16   Ht 157.5 cm (62\")   Wt 80.2 kg (176 lb 12.8 oz)   SpO2 98%   BMI 32.34 kg/m²     Physical Exam  Vitals and nursing note reviewed.   Constitutional:       General: She is not in acute distress.     Appearance: Normal appearance. She is well-developed and well-groomed. She is obese. She is not ill-appearing, toxic-appearing or diaphoretic.   HENT:      Head: Normocephalic and " atraumatic.      Right Ear: Hearing normal.      Left Ear: Hearing normal.   Eyes:      General: Lids are normal. No scleral icterus.     Extraocular Movements: Extraocular movements intact.   Neck:      Trachea: Phonation normal.   Cardiovascular:      Rate and Rhythm: Normal rate and regular rhythm.   Pulmonary:      Effort: Pulmonary effort is normal.      Breath sounds: Normal breath sounds.   Musculoskeletal:      Cervical back: Neck supple.   Skin:     Coloration: Skin is not jaundiced or pale.   Neurological:      General: No focal deficit present.      Mental Status: She is alert and oriented to person, place, and time.      Motor: Motor function is intact.   Psychiatric:         Attention and Perception: Attention and perception normal.         Mood and Affect: Mood and affect normal.         Speech: Speech normal.         Behavior: Behavior normal. Behavior is cooperative.         Thought Content: Thought content normal.         Cognition and Memory: Cognition and memory normal.         Judgment: Judgment normal.          The following data was reviewed by: Michelle Wynn MD on 06/11/2024:  PET/CT FDG Skull Base To Mid Thigh (04/30/2024 08:31)     Via [facility] Everywhere, 5/2/24 gyn/onc note Dr. Santiago:  Recurrent endometrial cancer treated with radiation therapy in June 2014  - CT scan July 2018 shows no evidence of recurrence and symptomatically she has no evidence of recurrent disease. It has been 9 years since recurrence.  - Continued vaginal bleeding, possibly coming atrophic bladder. Otherwise, feels well. No other symptoms concerning for recurrence.   - Pap smear in Jan 2021 was normal.    - She was seen in late March for vaginal bleeding with vaginal abnormality noted on exam. Pap smear showed atypical endometrial cells. PET scan on 4/30/24 showed poorly delineated hypermetabolic lesion at vaginal introitus, right pelvic sidewall hypermetabolic mass (measuring approximately 23 mm in short axis  with maximum SUV 11.9) and left external iliac lymph nodes (measuring approximately 10 mm in maximum), concerning for recurrence.     She had complications related to recent perc neph tube exchange on 4/5/24. She was admitted overnight and received 1 unit PRBC for left subcapsular hematoma. Bleeding stabilized without intervention.    - I reviewed findings with patient and her daughter in the office today. Patient has had a prolonged disease free interval but given lateralized pelvic sidewall recurrence this time, the risk of distant failure/recurrence is increased. Given that she has had prior pelvic radiation therapy, standard recommendations would include systemic platinum/taxane based chemotherapy. However, given that patient is on dialysis, she may consider modified palliative treatment with radiation therapy, which would include interstitial treatments to vagina and localized palliative therapy to the pelvic sidewalls. Would also recommend biopsy of vagina versus CT guided biopsy of pelvic sidewall to get tissue for molecular testing (CARIS). We also discussed observation with focus on quality of life given that patient is currently asymptomatic. Patient would like to proceed with observation for now and will consider if she would like to pursue further treatment in the future.   - She will return in 3 months with PET scan prior to assess disease progression. If she decides to proceed with treatment she would need biopsy of vaginal mass or pelvic mass for confirmation of recurrence and CARIS testing.                 Assessment and Plan   Diagnoses and all orders for this visit:    1. Medicare annual wellness visit, subsequent (Primary)    2. Endometrial cancer (recurrent)  -     HYDROcodone-acetaminophen (NORCO) 5-325 MG per tablet; Take 1 tablet by mouth Every 12 (Twelve) Hours As Needed for Severe Pain.  Dispense: 30 tablet; Refill: 0    3. ESRD (end stage renal disease) on dialysis  Assessment &  Plan:  Complicates all aspects of care. Follow up with nephrology      4. Benign hypertension with end-stage renal disease  Comments:  follow up nephrology  Assessment & Plan:  Hypertension is stable and controlled  Continue current treatment regimen.  Blood pressure will be reassessed in 1 year.    Orders:  -     carvedilol (COREG) 25 MG tablet; Take 1 tablet by mouth 2 (Two) Times a Day. Indications: High Blood Pressure Disorder  Dispense: 180 tablet; Refill: 3  -     amLODIPine (NORVASC) 10 MG tablet; Take 1 tablet by mouth Daily. for high blood pressure  Dispense: 90 tablet; Refill: 3    5. Sciatica of left side  -     gabapentin (NEURONTIN) 100 MG capsule; Take 1 capsule by mouth 3 (Three) Times a Day.  Dispense: 270 capsule; Refill: 1    6. Chronic pain of both knees  -     HYDROcodone-acetaminophen (NORCO) 5-325 MG per tablet; Take 1 tablet by mouth Every 12 (Twelve) Hours As Needed for Severe Pain.  Dispense: 30 tablet; Refill: 0    7. Pneumococcal vaccination declined           I spent 25 minutes caring for Pamela on this date of service. This time includes time spent by me in the following activities:preparing for the visit, reviewing tests, obtaining and/or reviewing a separately obtained history, performing a medically appropriate examination and/or evaluation , counseling and educating the patient/family/caregiver, ordering medications, tests, or procedures, and documenting information in the medical record    I spent 15 minutes on the separately reported service of [id number]. This time is not included in the time used to support the E/M service also reported today.     Follow Up   Return in about 1 year (around 6/11/2025) for Medicare Wellness, sooner if needed.  Patient was given instructions and counseling regarding her condition or for health maintenance advice. Please see specific information pulled into the AVS if appropriate.

## 2024-06-13 ENCOUNTER — PATIENT MESSAGE (OUTPATIENT)
Dept: INTERNAL MEDICINE | Facility: CLINIC | Age: 79
End: 2024-06-13
Payer: MEDICARE

## 2024-06-14 RX ORDER — HYDROCODONE BITARTRATE AND ACETAMINOPHEN 10; 325 MG/1; MG/1
0.5 TABLET ORAL EVERY 6 HOURS PRN
Qty: 15 TABLET | Refills: 0 | Status: SHIPPED | OUTPATIENT
Start: 2024-06-14

## 2024-06-14 NOTE — TELEPHONE ENCOUNTER
From: Pamela Herrera  To: Michelle Wynn  Sent: 6/13/2024 7:49 PM EDT  Subject: [address]    [facility] said they cannot get the 5mg tabs. Will you please convert to 10 MG? I will break them to take.     Thank you

## 2024-08-17 RX ORDER — HYDROCODONE BITARTRATE AND ACETAMINOPHEN 10; 325 MG/1; MG/1
0.5 TABLET ORAL EVERY 6 HOURS PRN
Qty: 15 TABLET | Refills: 0 | Status: CANCELLED | OUTPATIENT
Start: 2024-08-17

## 2024-08-19 DIAGNOSIS — M25.561 CHRONIC PAIN OF BOTH KNEES: Primary | ICD-10-CM

## 2024-08-19 DIAGNOSIS — M25.562 CHRONIC PAIN OF BOTH KNEES: Primary | ICD-10-CM

## 2024-08-19 DIAGNOSIS — C54.1 ENDOMETRIAL CANCER: ICD-10-CM

## 2024-08-19 DIAGNOSIS — G89.29 CHRONIC PAIN OF BOTH KNEES: Primary | ICD-10-CM

## 2024-08-20 RX ORDER — HYDROCODONE BITARTRATE AND ACETAMINOPHEN 10; 325 MG/1; MG/1
0.5 TABLET ORAL EVERY 6 HOURS PRN
Qty: 15 TABLET | Refills: 0 | Status: SHIPPED | OUTPATIENT
Start: 2024-08-20

## 2024-11-05 DIAGNOSIS — C54.1 ENDOMETRIAL CANCER: ICD-10-CM

## 2024-11-05 DIAGNOSIS — M25.562 CHRONIC PAIN OF BOTH KNEES: ICD-10-CM

## 2024-11-05 DIAGNOSIS — M25.561 CHRONIC PAIN OF BOTH KNEES: ICD-10-CM

## 2024-11-05 DIAGNOSIS — G89.29 CHRONIC PAIN OF BOTH KNEES: ICD-10-CM

## 2024-11-05 RX ORDER — HYDROCODONE BITARTRATE AND ACETAMINOPHEN 10; 325 MG/1; MG/1
0.5 TABLET ORAL EVERY 6 HOURS PRN
Qty: 30 TABLET | Refills: 0 | Status: SHIPPED | OUTPATIENT
Start: 2024-11-05

## 2024-11-05 NOTE — TELEPHONE ENCOUNTER
Caller: Javier Pamela FULTON    Relationship: Self    Best call back number: [phone] (home)     Requested Prescriptions:   Requested Prescriptions     Pending Prescriptions Disp Refills    HYDROcodone-acetaminophen (NORCO)  MG per tablet 15 tablet 0     Sig: Take 0.5 tablets by mouth Every 6 (Six) Hours As Needed for Moderate Pain.        Pharmacy where request should be sent: [facility]/PHARMACY #[id number] - [address] [phone] [place] [phone]      Last office visit with prescribing clinician: 6/11/2024   Last telemedicine visit with prescribing clinician: Visit date not found   Next office visit with prescribing clinician: 6/19/2025     Additional details provided by patient:     Does the patient have less than a 3 day supply:  [x] Yes  [] No    Would you like a call back once the refill request has been completed: [] Yes [x] No    If the office needs to give you a call back, can they leave a voicemail: [] Yes [x] No    Beau Cabrera Rep   11/05/24 15:39 EST

## 2024-11-05 NOTE — TELEPHONE ENCOUNTER
Rx Refill Note  Requested Prescriptions     Pending Prescriptions Disp Refills    HYDROcodone-acetaminophen (NORCO)  MG per tablet 15 tablet 0     Sig: Take 0.5 tablets by mouth Every 6 (Six) Hours As Needed for Moderate Pain.      Last office visit with prescribing clinician: 6/11/2024   Last telemedicine visit with prescribing clinician: Visit date not found   Next office visit with prescribing clinician: 6/19/2025       }        11/05/24, 16:58 EST     Last UDS-none

## 2024-12-09 DIAGNOSIS — M54.32 SCIATICA OF LEFT SIDE: ICD-10-CM

## 2024-12-09 DIAGNOSIS — N18.6 ESRD (END STAGE RENAL DISEASE) ON DIALYSIS: ICD-10-CM

## 2024-12-09 DIAGNOSIS — Z99.2 ESRD (END STAGE RENAL DISEASE) ON DIALYSIS: ICD-10-CM

## 2024-12-09 RX ORDER — GABAPENTIN 100 MG/1
100 CAPSULE ORAL 3 TIMES DAILY
Qty: 270 CAPSULE | Refills: 1 | Status: SHIPPED | OUTPATIENT
Start: 2024-12-09

## 2024-12-09 RX ORDER — LIDOCAINE/PRILOCAINE 2.5 %-2.5%
CREAM (GRAM) TOPICAL
Qty: 30 G | Refills: 11 | Status: SHIPPED | OUTPATIENT
Start: 2024-12-09

## 2024-12-09 NOTE — TELEPHONE ENCOUNTER
Caller: Javier Pamela FULTON    Relationship: Self    Best call back number: [phone]    Requested Prescriptions:   Requested Prescriptions      No prescriptions requested or ordered in this encounter      gabapentin (NEURONTIN) 100 MG capsule     lidocaine-prilocaine (EMLA) 2.5-2.5 % cream     Pharmacy where request should be sent: [facility]/PHARMACY #[id number] - [place], [place] - [address] [place] [address] [phone] [facility] [phone]      Last office visit with prescribing clinician: 6/11/2024   Last telemedicine visit with prescribing clinician: Visit date not found   Next office visit with prescribing clinician: 6/19/2025     Does the patient have less than a 3 day supply:  [] Yes  [x] No    Would you like a call back once the refill request has been completed: [] Yes [x] No    If the office needs to give you a call back, can they leave a voicemail: [] Yes [x] No    Mirna Owen, PCT   12/09/24 16:25 EST

## 2024-12-09 NOTE — TELEPHONE ENCOUNTER
Rx Refill Note  Requested Prescriptions      No prescriptions requested or ordered in this encounter      Last office visit with prescribing clinician: 6/11/2024   Last telemedicine visit with prescribing clinician: Visit date not found   Next office visit with prescribing clinician: 6/19/2025        Last UDS-none.

## 2024-12-30 DIAGNOSIS — G89.29 CHRONIC PAIN OF BOTH KNEES: ICD-10-CM

## 2024-12-30 DIAGNOSIS — M25.561 CHRONIC PAIN OF BOTH KNEES: ICD-10-CM

## 2024-12-30 DIAGNOSIS — M25.562 CHRONIC PAIN OF BOTH KNEES: ICD-10-CM

## 2024-12-30 DIAGNOSIS — C54.1 ENDOMETRIAL CANCER: ICD-10-CM

## 2024-12-30 RX ORDER — HYDROCODONE BITARTRATE AND ACETAMINOPHEN 10; 325 MG/1; MG/1
0.5 TABLET ORAL EVERY 6 HOURS PRN
Qty: 30 TABLET | Refills: 0 | Status: SHIPPED | OUTPATIENT
Start: 2024-12-30

## 2024-12-30 NOTE — TELEPHONE ENCOUNTER
Caller: Javier Pamela FULTON    Relationship: Self    Best call back number: [phone]     Requested Prescriptions:   Requested Prescriptions     Pending Prescriptions Disp Refills    HYDROcodone-acetaminophen (NORCO)  MG per tablet 30 tablet 0     Sig: Take 0.5 tablets by mouth Every 6 (Six) Hours As Needed for Severe Pain.        Pharmacy where request should be sent: [facility]/PHARMACY #[id number] - [place], [place] - [address] [facility] [phone] [place] [phone]      Last office visit with prescribing clinician: 6/11/2024   Last telemedicine visit with prescribing clinician: Visit date not found   Next office visit with prescribing clinician: 6/19/2025     Additional details provided by patient: PATIENT STATED THAT SHE STILL HAS 7 PILLS REMAINING

## 2025-01-01 ENCOUNTER — HOSPITAL ENCOUNTER (OUTPATIENT)
Facility: HOSPITAL | Age: 80
Setting detail: RADIATION/ONCOLOGY SERIES
End: 2025-01-01
Payer: MEDICARE

## 2025-01-01 ENCOUNTER — TELEPHONE (OUTPATIENT)
Dept: PALLIATIVE CARE | Facility: CLINIC | Age: 80
End: 2025-01-01

## 2025-01-01 PROCEDURE — 77386: CPT | Performed by: RADIOLOGY

## 2025-01-01 PROCEDURE — 77336 RADIATION PHYSICS CONSULT: CPT | Performed by: RADIOLOGY

## 2025-03-03 DIAGNOSIS — C54.1 ENDOMETRIAL CANCER: ICD-10-CM

## 2025-03-03 DIAGNOSIS — M25.562 CHRONIC PAIN OF BOTH KNEES: ICD-10-CM

## 2025-03-03 DIAGNOSIS — G89.29 CHRONIC PAIN OF BOTH KNEES: ICD-10-CM

## 2025-03-03 DIAGNOSIS — M25.561 CHRONIC PAIN OF BOTH KNEES: ICD-10-CM

## 2025-03-03 RX ORDER — HYDROCODONE BITARTRATE AND ACETAMINOPHEN 10; 325 MG/1; MG/1
0.5 TABLET ORAL EVERY 6 HOURS PRN
Qty: 30 TABLET | Refills: 0 | Status: SHIPPED | OUTPATIENT
Start: 2025-03-03

## 2025-03-03 NOTE — TELEPHONE ENCOUNTER
Called patient and gave her the message in the lab letter dated 12/11/17.  Patient is wondering if she should keep her ultrasound appt for 12/13/17, informed her that yes she should still proceed with that appt.  She denies further questions, letter mailed.   Rx Refill Note  Requested Prescriptions     Pending Prescriptions Disp Refills    HYDROcodone-acetaminophen (NORCO)  MG per tablet 30 tablet 0     Sig: Take 0.5 tablets by mouth Every 6 (Six) Hours As Needed for Severe Pain.      Last office visit with prescribing clinician: 6/11/2024   Last telemedicine visit with prescribing clinician: Visit date not found   Next office visit with prescribing clinician: 6/19/2025                         Would you like a call back once the refill request has been completed: [] Yes [] No    If the office needs to give you a call back, can they leave a voicemail: [] Yes [] No    Jordyn Bender, [facility]  03/03/25, 10:37 EST

## 2025-03-03 NOTE — TELEPHONE ENCOUNTER
Caller: Pamela Herrera    Relationship: Self    Best call back number: [phone]     Requested Prescriptions:   Requested Prescriptions     Pending Prescriptions Disp Refills    HYDROcodone-acetaminophen (NORCO)  MG per tablet 30 tablet 0     Sig: Take 0.5 tablets by mouth Every 6 (Six) Hours As Needed for Severe Pain.        Pharmacy where request should be sent: [facility]/PHARMACY #[id number] - [facility] [address] [facility] [phone] [place] [phone]      Last office visit with prescribing clinician: 6/11/2024   Last telemedicine visit with prescribing clinician: Visit date not found   Next office visit with prescribing clinician: 6/19/2025     Additional details provided by patient:     Does the patient have less than a 3 day supply:  [] Yes  [x] No    Would you like a call back once the refill request has been completed: [] Yes [x] No    If the office needs to give you a call back, can they leave a voicemail: [] Yes [x] No    Beau August Rep   03/03/25 09:46 EST

## 2025-03-06 DIAGNOSIS — M54.32 SCIATICA OF LEFT SIDE: ICD-10-CM

## 2025-03-06 RX ORDER — GABAPENTIN 100 MG/1
100 CAPSULE ORAL 3 TIMES DAILY
Qty: 270 CAPSULE | Refills: 1 | OUTPATIENT
Start: 2025-03-06

## 2025-03-06 NOTE — TELEPHONE ENCOUNTER
Caller: Pamela Herrera    Relationship: Self    Best call back number: [phone]     Requested Prescriptions:   Requested Prescriptions     Pending Prescriptions Disp Refills    gabapentin (NEURONTIN) 100 MG capsule 270 capsule 1     Sig: Take 1 capsule by mouth 3 (Three) Times a Day.        Pharmacy where request should be sent: [facility]/PHARMACY #[id number] - [address] [phone] [place] [phone]      Last office visit with prescribing clinician: 6/11/2024   Last telemedicine visit with prescribing clinician: Visit date not found   Next office visit with prescribing clinician: 6/19/2025     Additional details provided by patient: PATIENT WILL BE LEAVING FOR 2 WEEKS AND NEEDS TO  HER MEDICATION REFILL BEFORE SHE LEAVES TOMORROW, 03/07/25. THE PHARMACY STATES SHE CAN NOT  FOR 5 MORE DAYS AND NEEDS APPROVAL FROM THE PCP TO REFILL TODAY. PLEASE CALL    Does the patient have less than a 3 day supply:  [] Yes  [x] No    Would you like a call back once the refill request has been completed: [] Yes [x] No, NOTIFY IF UNABLE TO REFILL EARLY    If the office needs to give you a call back, can they leave a voicemail: [x] Yes [] No    Beau Peraza Rep   03/06/25 14:49 EST

## 2025-04-16 DIAGNOSIS — C54.1 ENDOMETRIAL CANCER: ICD-10-CM

## 2025-04-16 DIAGNOSIS — M25.561 CHRONIC PAIN OF BOTH KNEES: ICD-10-CM

## 2025-04-16 DIAGNOSIS — G89.29 CHRONIC PAIN OF BOTH KNEES: ICD-10-CM

## 2025-04-16 DIAGNOSIS — M25.562 CHRONIC PAIN OF BOTH KNEES: ICD-10-CM

## 2025-04-16 RX ORDER — HYDROCODONE BITARTRATE AND ACETAMINOPHEN 10; 325 MG/1; MG/1
0.5 TABLET ORAL EVERY 6 HOURS PRN
Qty: 30 TABLET | Refills: 0 | Status: SHIPPED | OUTPATIENT
Start: 2025-04-16

## 2025-04-16 NOTE — TELEPHONE ENCOUNTER
Caller: Pamela Herrera    Relationship: Self    Best call back number: [phone]     Requested Prescriptions:   Requested Prescriptions     Pending Prescriptions Disp Refills    HYDROcodone-acetaminophen (NORCO)  MG per tablet 30 tablet 0     Sig: Take 0.5 tablets by mouth Every 6 (Six) Hours As Needed for Severe Pain.        Pharmacy where request should be sent: [facility]/PHARMACY #[id number] - [facility] [address] [facility] [phone] [place] [phone]      Last office visit with prescribing clinician: 6/11/2024   Last telemedicine visit with prescribing clinician: Visit date not found   Next office visit with prescribing clinician: 6/19/2025     Additional details provided by patient: PATIENT HAS 2 PILLS LEFT    Does the patient have less than a 3 day supply:  [x] Yes  [] No    Would you like a call back once the refill request has been completed: [] Yes [x] No    If the office needs to give you a call back, can they leave a voicemail: [] Yes [x] No    Ila Francois   04/16/25 09:15 EDT

## 2025-04-25 ENCOUNTER — HOSPITAL ENCOUNTER (OUTPATIENT)
Facility: HOSPITAL | Age: 80
Setting detail: RADIATION/ONCOLOGY SERIES
End: 2025-04-25
Payer: MEDICARE

## 2025-04-30 ENCOUNTER — OFFICE VISIT (OUTPATIENT)
Age: 80
End: 2025-04-30
Payer: MEDICARE

## 2025-04-30 ENCOUNTER — HOSPITAL ENCOUNTER (OUTPATIENT)
Facility: HOSPITAL | Age: 80
Discharge: HOME | End: 2025-04-30

## 2025-04-30 VITALS
TEMPERATURE: 97.6 F | OXYGEN SATURATION: 94 % | WEIGHT: 172.9 LBS | HEIGHT: 62 IN | SYSTOLIC BLOOD PRESSURE: 156 MMHG | HEART RATE: 67 BPM | DIASTOLIC BLOOD PRESSURE: 67 MMHG | RESPIRATION RATE: 18 BRPM | BODY MASS INDEX: 31.82 KG/M2

## 2025-04-30 DIAGNOSIS — C54.1 ENDOMETRIAL CANCER: Primary | ICD-10-CM

## 2025-04-30 PROCEDURE — G0463 HOSPITAL OUTPT CLINIC VISIT: HCPCS

## 2025-04-30 PROCEDURE — 77334 RADIATION TREATMENT AID(S): CPT | Performed by: RADIOLOGY

## 2025-04-30 PROCEDURE — 77332 RADIATION TREATMENT AID(S): CPT | Performed by: RADIOLOGY

## 2025-04-30 RX ORDER — FLUCONAZOLE 150 MG/1
TABLET ORAL
COMMUNITY
Start: 2025-01-31

## 2025-04-30 RX ORDER — SUCROFERRIC OXYHYDROXIDE 500 MG/1
TABLET, CHEWABLE ORAL
COMMUNITY
Start: 2025-04-07

## 2025-04-30 NOTE — PROGRESS NOTES
CONSULTATION NOTE      :                                                          1945  DATE OF CONSULTATION:                       2025   REQUESTING PHYSICIAN:                   Yomaira Santiago MD  REASON FOR CONSULTATION:           Recurrent endometrial cancer   Cancer Staging   Recurrent 1A grade 1    Thank you for requesting my services in evaluation of this pleasant individual.  I am seeing them in outpatient consultation regarding a diagnosis of recurrent endometrial cancer.     BRIEF HISTORY:  The patient is a very pleasant 79 y.o. female  with multiple medical comorbidities and a longstanding history of recurrent endometrial cancer.  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  She did not require any adjuvant treatments, but unfortunately developed a recurrence in the pelvis that was treated with salvage radiation therapy in 2014.  She describes a very difficult recovery.  That was marked by predominantly gastrointestinal symptoms from her radiation treatments, but she subsequently developed ureteral obstruction, urosepsis, as well as C. difficile colitis.  She required multiple hospitalizations during the recovery period and unfortunately has remained with end-stage renal disease since that time.  She has bilateral percutaneous nephrostomy tubes and has been undergoing dialysis 3 days/week.  She unfortunately continues to have recurrent disease, most notably with small volume pulmonary metastases as well as hepatic metastases with a right pelvic sidewall mass and enlarging vaginal disease.  She has been experiencing pain and bleeding specifically within the vagina but she also describes some discomfort radiating down the right buttocks to the posterior thigh.  Her family has had discussions regarding transitioning her care to hospice, but due to to the ongoing need for hemodialysis,  she is not a candidate for hospice as long as she is undergoing dialysis.  She is therefore being referred to my clinic today for consideration of palliative radiation therapy.  A symptomatic standpoint, she reports chronic loose stool and intermittent diarrhea.  She takes Lomotil 2 times a day and this has been her normal symptom going back to 2014.  She reports some degree of urinary incontinence and leakage for which she wears pull-ups.  She continues to have intermittent vaginal bleeding with spotting almost continuously.    Allergy:   Allergies   Allergen Reactions    Iron Other (See Comments)     Iron infusion additive-Passes out    Procaine Other (See Comments)     Novacaine, passes out    Latex Rash    Metronidazole Rash     C-diff    Penicillins Rash       Social History:   Social History     Socioeconomic History    Marital status:    Tobacco Use    Smoking status: Never    Smokeless tobacco: Never   Vaping Use    Vaping status: Never Used   Substance and Sexual Activity    Alcohol use: Never    Drug use: Never    Sexual activity: Not Currently     Partners: Male     Birth control/protection: Abstinence       Past Medical History:   Past Medical History:   Diagnosis Date    Anemia 4/4/24    S/p nephrostomy tube change    C. difficile colitis     Chronic heart failure     Diverticula of colon     Endometrial cancer     per [facility] notes stage IA, recurrent    ESRD (end stage renal disease)     left arm fistula    Hypertension     Lymphedema of both lower extremities     Obesity     S/P radiation therapy        Family History: family history includes Cancer in her daughter, father, and another family member; Heart attack in an other family member.     Surgical History:   Past Surgical History:   Procedure Laterality Date    HERNIA REPAIR      HYSTERECTOMY      KIDNEY SURGERY Bilateral     stents- removed 2014    OTHER SURGICAL HISTORY      replacement of nephrostomy tube    PERCUTANEOUS NEPHROSTOMY  "Bilateral     TUNNELED VENOUS CATHETER PLACEMENT Right     Surgically removed January 2020        Review of Systems:   Review of Systems   Constitutional:  Positive for fatigue.   Respiratory:  Positive for shortness of breath.    Cardiovascular:  Positive for leg swelling (bilateral LE).        Left arm fistula   Gastrointestinal:         Incontinence   Genitourinary:  Positive for pelvic pain, vaginal bleeding and vaginal discharge. Dysuria: with exertion.   Musculoskeletal:  Positive for gait problem (rollator).   Neurological:  Positive for gait problem (rollator).   All other systems reviewed and are negative.          Objective   VITAL SIGNS:   Vitals:    04/30/25 1004   BP: 156/67   Pulse: 67   Resp: 18   Temp: 97.6 °F (36.4 °C)   TempSrc: Temporal   SpO2: 94%   Weight: 78.4 kg (172 lb 14.4 oz)   Height: 157.5 cm (62\")   PainSc: 8    PainLoc: Comment: vagina        Karnofsky score: 80       Physical Exam:   Physical Exam  Vitals and nursing note reviewed.   Constitutional:       General: She is not in acute distress.     Appearance: She is well-developed.      Comments: Frail   HENT:      Head: Normocephalic and atraumatic.   Eyes:      Conjunctiva/sclera: Conjunctivae normal.      Pupils: Pupils are equal, round, and reactive to light.   Cardiovascular:      Rate and Rhythm: Normal rate and regular rhythm.      Heart sounds: No murmur heard.     No friction rub.   Pulmonary:      Effort: Pulmonary effort is normal.      Breath sounds: Normal breath sounds. No wheezing.   Abdominal:      General: Bowel sounds are normal. There is no distension.      Palpations: Abdomen is soft. There is no mass.      Tenderness: There is no abdominal tenderness.   Genitourinary:     Comments: There is a 3x5cm exophytic mass involving the left posterior and medial vulva, crossing midline at the posterior introitus to include the right medial labia minora.  There is small volume bleeding present.  There is an adjacent 5x5mm " tumor nodule approximately 1cm from the primary mass on the left labia.  Upper vaginal exam reveals studding along the anterior and left lateral vaginal sidewalls up to the cuff where there is induration and studding across the entire cuff, left more than right.  No significant inguinal adenopathy.  Musculoskeletal:         General: Normal range of motion.      Cervical back: Normal range of motion and neck supple.      Comments: Bilateral percutaneous nephrostomy tubes   Lymphadenopathy:      Cervical: No cervical adenopathy.   Skin:     General: Skin is warm and dry.   Neurological:      Mental Status: She is alert and oriented to person, place, and time.   Psychiatric:         Behavior: Behavior normal.         Thought Content: Thought content normal.         Judgment: Judgment normal.     IMAGING  I have personally reviewed the relevant imaging studies, as follows:  NM PET/CT Skull Base to Mid Thigh  Result Date: 4/17/2025  Evidence of progression of disease compared to PET/CT 8/1/2024. Interval development of numerous hypermetabolic pulmonary nodules and right hepatic lobe focal hypermetabolism suggestive of hepatic and pulmonary metastatic disease. Limited characterization of the hepatic metastasis due to lack of IV contrast, if clinically warranted CT liver protocol with IV contrast or abdomen MRI with and without contrast could be performed. Interval increase in size of the poorly defined right pelvic wall hypermetabolic soft tissue nodularity with left inguinal adenopathy, compatible with progression of rodolfo metastatic disease in the pelvis. Postsurgical changes of prior hysterectomy. Marked hypermetabolic activity in appearance of increased soft tissue thickening in the vaginal cuff and vaginal canal concerning for regional recurrence. Activity does not fused to the decompressed urinary bladder. Overall pattern is not typical for urine activity or contamination, particularly given predominant drainage via  bilateral percutaneous nephrostomy tubes. However, this could be correlated with targeted physical exam. Focal thickening with FDG uptake within the left fossa Box-Montalvo, this may be physiologic/inflammatory however amenable for direct visualization and sampling if symptomatic. CRITICAL RESULT: No. COMMUNICATION: Per this written report. By electronically signing this report, I, the attending physician, attest that I have personally reviewed the images/data for the above examination(s) and agree with the final edited report. Drafted by Jeanmarie Floyd MD on 4/17/2025 10:22 AM Final report signed by Sean Joshi MD on 4/17/2025 3:10 PM       The following portions of the patient's history were reviewed and updated as appropriate: allergies, current medications, past family history, past medical history, past social history, past surgical history, and problem list.    Assessment:   Assessment  Ms. Herrera is an 79-year-old female with a recurrent endometrial cancer.  She has right pelvic sidewall disease as well as rather extensive disease within the vagina.  At this point the goals of her care have largely shifted with the goals of being palliative, and her primary source of her discomfort is related to the right pelvic pain as well as the ongoing bleeding and from her vaginal disease.  Due to her prior radiation treatments, this will limit her options for reradiation, but I do think it would be reasonable to treat her in a hypofractionated course such as a quad shot which would enable us to target the right pelvic sidewall disease as well as the vagina with an overall low risk for toxicity.  This will consist of IMRT to deliver 14 Gy over 4 fractions in a 48 hour period.   I believe this will be a better approach for her considering she has such extensive disease in the vagina that is going to be very challenging to implant, and we will be unable to target the sidewall disease with brachytherapy in the very  close proximity and location within the bifurcation of the common iliac vessels.  After a full discussion with the patient and her family, she was agreeable and she signed informed consent.  I we will proceed and complete a radiation set up and simulation session later today, and we will aim to have her treatments ready to begin in the next 1-2 weeks pending insurance authorization.     RECOMMENDATIONS:    Plan for short course of palliative radiation therapy to the right pelvic sidewall mass and the vaginal disease - 14 Gy in 4 fractions over 48 hours  Return to clinic in the next 2 weeks for radiation planning    I spent a total of 60 minutes on todays visit, with more than 45 minutes in direct face to face communication, and the remainder of the time spent in reviewing the relevant history, records, available imaging, and for documentation.    Follow Up:   Return in about 2 weeks (around 5/14/2025) for To Begin Radiation Treatment.  Diagnoses and all orders for this visit:    1. Endometrial cancer (Primary)  -     Ambulatory Referral to ONC Social Work    Other orders  -     SCANNED PATHOLOGY  -     SCANNED PATHOLOGY    Thank you for allowing me to participate in the care of this individual.    Sincerely,       Junior Dunne MD       Clear bilaterally, pupils equal, round and reactive to light. detailed exam

## 2025-05-01 ENCOUNTER — DOCUMENTATION (OUTPATIENT)
Dept: OTHER | Facility: HOSPITAL | Age: 80
End: 2025-05-01
Payer: MEDICARE

## 2025-05-01 DIAGNOSIS — C54.1 ENDOMETRIAL CANCER: Primary | ICD-10-CM

## 2025-05-01 NOTE — PROGRESS NOTES
Distress Screening Follow-up    Name: Pamela Herrera    : 1945    Diagnosis: Endometrial cancer    Location of Distress Screening: Radiation Oncology    Distress Level: 8 (2025 10:00 AM)      Physical Concerns:  Fatigue: Y  Memory or concentration: Y  Changes in eating: Y  Loss or change of physical abilities: Y  Pain: Y      Emotional Concerns:  Worry or anxiety: Y  Feelings of worthlessness or being a burden: Y      Practical Concerns:  Taking care of myself: Y  Treatment decisions: Y       Interventions:    N/A    Comments:   KEYONNA contacted pt to follow up on Distress Screen from recent visit. KEYONNA provided introductions and explained nature of the call. Pt communicated she is doing okay at this time, and denied any needs. SW provided education on role and resources available, and provided contact information should needs arise. PT thanked KEYONNA for the call and was agreeable to reach out as needed. SW will be available ongoing.

## 2025-05-02 DIAGNOSIS — C54.1 ENDOMETRIAL CANCER: Primary | ICD-10-CM

## 2025-05-05 ENCOUNTER — HOSPITAL ENCOUNTER (OUTPATIENT)
Facility: HOSPITAL | Age: 80
Setting detail: RADIATION/ONCOLOGY SERIES
End: 2025-05-05
Payer: MEDICARE

## 2025-05-05 ENCOUNTER — HOSPITAL ENCOUNTER (OUTPATIENT)
Facility: HOSPITAL | Age: 80
Discharge: HOME | End: 2025-05-05
Payer: MEDICARE

## 2025-05-05 LAB
RAD ONC ARIA COURSE ID: NORMAL
RAD ONC ARIA COURSE INTENT: NORMAL
RAD ONC ARIA COURSE LAST TREATMENT DATE: NORMAL
RAD ONC ARIA COURSE START DATE: NORMAL
RAD ONC ARIA COURSE TREATMENT ELAPSED DAYS: 0
RAD ONC ARIA FIRST TREATMENT DATE: NORMAL
RAD ONC ARIA PLAN FRACTIONS TREATED TO DATE: 1
RAD ONC ARIA PLAN ID: NORMAL
RAD ONC ARIA PLAN NAME: NORMAL
RAD ONC ARIA PLAN PRESCRIBED DOSE PER FRACTION: 3.5 GY
RAD ONC ARIA PLAN PRIMARY REFERENCE POINT: NORMAL
RAD ONC ARIA PLAN TOTAL FRACTIONS PRESCRIBED: 4
RAD ONC ARIA PLAN TOTAL PRESCRIBED DOSE: 1400 CGY
RAD ONC ARIA REFERENCE POINT DOSAGE GIVEN TO DATE: 3.5 GY
RAD ONC ARIA REFERENCE POINT ID: NORMAL
RAD ONC ARIA REFERENCE POINT SESSION DOSAGE GIVEN: 3.5 GY

## 2025-05-05 PROCEDURE — 77386: CPT | Performed by: RADIOLOGY

## 2025-05-06 ENCOUNTER — HOSPITAL ENCOUNTER (OUTPATIENT)
Facility: HOSPITAL | Age: 80
Discharge: HOME | End: 2025-05-06

## 2025-05-06 LAB
RAD ONC ARIA COURSE ID: NORMAL
RAD ONC ARIA COURSE ID: NORMAL
RAD ONC ARIA COURSE INTENT: NORMAL
RAD ONC ARIA COURSE INTENT: NORMAL
RAD ONC ARIA COURSE LAST TREATMENT DATE: NORMAL
RAD ONC ARIA COURSE LAST TREATMENT DATE: NORMAL
RAD ONC ARIA COURSE START DATE: NORMAL
RAD ONC ARIA COURSE START DATE: NORMAL
RAD ONC ARIA COURSE TREATMENT ELAPSED DAYS: 1
RAD ONC ARIA COURSE TREATMENT ELAPSED DAYS: 1
RAD ONC ARIA FIRST TREATMENT DATE: NORMAL
RAD ONC ARIA FIRST TREATMENT DATE: NORMAL
RAD ONC ARIA PLAN FRACTIONS TREATED TO DATE: 2
RAD ONC ARIA PLAN FRACTIONS TREATED TO DATE: 3
RAD ONC ARIA PLAN ID: NORMAL
RAD ONC ARIA PLAN ID: NORMAL
RAD ONC ARIA PLAN NAME: NORMAL
RAD ONC ARIA PLAN NAME: NORMAL
RAD ONC ARIA PLAN PRESCRIBED DOSE PER FRACTION: 3.5 GY
RAD ONC ARIA PLAN PRESCRIBED DOSE PER FRACTION: 3.5 GY
RAD ONC ARIA PLAN PRIMARY REFERENCE POINT: NORMAL
RAD ONC ARIA PLAN PRIMARY REFERENCE POINT: NORMAL
RAD ONC ARIA PLAN TOTAL FRACTIONS PRESCRIBED: 4
RAD ONC ARIA PLAN TOTAL FRACTIONS PRESCRIBED: 4
RAD ONC ARIA PLAN TOTAL PRESCRIBED DOSE: 1400 CGY
RAD ONC ARIA PLAN TOTAL PRESCRIBED DOSE: 1400 CGY
RAD ONC ARIA REFERENCE POINT DOSAGE GIVEN TO DATE: 10.5 GY
RAD ONC ARIA REFERENCE POINT DOSAGE GIVEN TO DATE: 7 GY
RAD ONC ARIA REFERENCE POINT ID: NORMAL
RAD ONC ARIA REFERENCE POINT ID: NORMAL
RAD ONC ARIA REFERENCE POINT SESSION DOSAGE GIVEN: 3.5 GY
RAD ONC ARIA REFERENCE POINT SESSION DOSAGE GIVEN: 3.5 GY

## 2025-05-06 PROCEDURE — 77386: CPT | Performed by: RADIOLOGY

## 2025-05-07 ENCOUNTER — HOSPITAL ENCOUNTER (OUTPATIENT)
Facility: HOSPITAL | Age: 80
Discharge: HOME | End: 2025-05-07

## 2025-05-07 VITALS — WEIGHT: 175.04 LBS | BODY MASS INDEX: 32.02 KG/M2

## 2025-05-07 LAB
RAD ONC ARIA COURSE ID: NORMAL
RAD ONC ARIA COURSE INTENT: NORMAL
RAD ONC ARIA COURSE LAST TREATMENT DATE: NORMAL
RAD ONC ARIA COURSE START DATE: NORMAL
RAD ONC ARIA COURSE TREATMENT ELAPSED DAYS: 2
RAD ONC ARIA FIRST TREATMENT DATE: NORMAL
RAD ONC ARIA PLAN FRACTIONS TREATED TO DATE: 4
RAD ONC ARIA PLAN ID: NORMAL
RAD ONC ARIA PLAN NAME: NORMAL
RAD ONC ARIA PLAN PRESCRIBED DOSE PER FRACTION: 3.5 GY
RAD ONC ARIA PLAN PRIMARY REFERENCE POINT: NORMAL
RAD ONC ARIA PLAN TOTAL FRACTIONS PRESCRIBED: 4
RAD ONC ARIA PLAN TOTAL PRESCRIBED DOSE: 1400 CGY
RAD ONC ARIA REFERENCE POINT DOSAGE GIVEN TO DATE: 14 GY
RAD ONC ARIA REFERENCE POINT ID: NORMAL
RAD ONC ARIA REFERENCE POINT SESSION DOSAGE GIVEN: 3.5 GY

## 2025-05-07 PROCEDURE — 77386: CPT | Performed by: RADIOLOGY

## 2025-05-13 DIAGNOSIS — M25.561 CHRONIC PAIN OF BOTH KNEES: ICD-10-CM

## 2025-05-13 DIAGNOSIS — I12.0 BENIGN HYPERTENSION WITH END-STAGE RENAL DISEASE: ICD-10-CM

## 2025-05-13 DIAGNOSIS — C54.1 ENDOMETRIAL CANCER: ICD-10-CM

## 2025-05-13 DIAGNOSIS — N18.6 BENIGN HYPERTENSION WITH END-STAGE RENAL DISEASE: ICD-10-CM

## 2025-05-13 DIAGNOSIS — G89.29 CHRONIC PAIN OF BOTH KNEES: ICD-10-CM

## 2025-05-13 DIAGNOSIS — M25.562 CHRONIC PAIN OF BOTH KNEES: ICD-10-CM

## 2025-05-13 LAB
RAD ONC ARIA COURSE END DATE: NORMAL
RAD ONC ARIA COURSE ID: NORMAL
RAD ONC ARIA COURSE INTENT: NORMAL
RAD ONC ARIA COURSE LAST TREATMENT DATE: NORMAL
RAD ONC ARIA COURSE START DATE: NORMAL
RAD ONC ARIA COURSE TREATMENT ELAPSED DAYS: 2
RAD ONC ARIA FIRST TREATMENT DATE: NORMAL
RAD ONC ARIA PLAN FRACTIONS TREATED TO DATE: 4
RAD ONC ARIA PLAN ID: NORMAL
RAD ONC ARIA PLAN NAME: NORMAL
RAD ONC ARIA PLAN PRESCRIBED DOSE PER FRACTION: 3.5 GY
RAD ONC ARIA PLAN PRIMARY REFERENCE POINT: NORMAL
RAD ONC ARIA PLAN TOTAL FRACTIONS PRESCRIBED: 4
RAD ONC ARIA PLAN TOTAL PRESCRIBED DOSE: 1400 CGY
RAD ONC ARIA REFERENCE POINT DOSAGE GIVEN TO DATE: 14 GY
RAD ONC ARIA REFERENCE POINT ID: NORMAL

## 2025-05-13 RX ORDER — CARVEDILOL 25 MG/1
TABLET ORAL
Qty: 180 TABLET | Refills: 0 | Status: SHIPPED | OUTPATIENT
Start: 2025-05-13

## 2025-05-13 RX ORDER — HYDROCODONE BITARTRATE AND ACETAMINOPHEN 10; 325 MG/1; MG/1
0.5 TABLET ORAL EVERY 6 HOURS PRN
Qty: 30 TABLET | Refills: 0 | Status: SHIPPED | OUTPATIENT
Start: 2025-05-13

## 2025-05-13 RX ORDER — AMLODIPINE BESYLATE 10 MG/1
10 TABLET ORAL DAILY
Qty: 90 TABLET | Refills: 0 | Status: SHIPPED | OUTPATIENT
Start: 2025-05-13

## 2025-05-13 NOTE — TELEPHONE ENCOUNTER
Rx Refill Note  Fail protocol  Requested Prescriptions     Pending Prescriptions Disp Refills    amLODIPine (NORVASC) 10 MG tablet [Pharmacy Med Name: AMLODIPINE BESYLATE 10 MG TAB] 90 tablet 3     Sig: TAKE 1 TABLET BY MOUTH DAILY FOR HIGH BLOOD PRESSURE    carvedilol (COREG) 25 MG tablet [Pharmacy Med Name: CARVEDILOL 25 MG TABLET] 180 tablet 3     Sig: TAKE 1 TABLET BY MOUTH 2 (TWO) TIMES A DAY - FOR HIGH BLOOD PRESSURE DISORDER      Last office visit with prescribing clinician: 6/11/2024   Last telemedicine visit with prescribing clinician: Visit date not found   Next office visit with prescribing clinician: 5/13/2025                         Would you like a call back once the refill request has been completed: [] Yes [] No    If the office needs to give you a call back, can they leave a voicemail: [] Yes [] No    Jordyn Bender, NADJA  05/13/25, 10:33 EDT

## 2025-05-13 NOTE — TELEPHONE ENCOUNTER
Caller: JavierPamela lundy S    Relationship: Self    Best call back number: [phone]    Requested Prescriptions:   Requested Prescriptions      No prescriptions requested or ordered in this encounter      HYDROcodone-acetaminophen (NORCO)  MG per tablet     lidocaine-prilocaine (EMLA) 2.5-2.5 % cream     Pharmacy where request should be sent: [id number] - [place], [place] - [address] [phone] [facility] [phone]      Last office visit with prescribing clinician: 6/11/2024   Last telemedicine visit with prescribing clinician: Visit date not found   Next office visit with prescribing clinician: 6/19/2025     Does the patient have less than a 3 day supply:  [] Yes  [x] No    Would you like a call back once the refill request has been completed: [] Yes [x] No    If the office needs to give you a call back, can they leave a voicemail: [] Yes [x] No    Mirna Owen, PCT   05/13/25 10:01 EDT

## 2025-05-13 NOTE — TELEPHONE ENCOUNTER
Rx Refill Note  Requested Prescriptions     Pending Prescriptions Disp Refills    HYDROcodone-acetaminophen (NORCO)  MG per tablet 30 tablet 0     Sig: Take 0.5 tablets by mouth Every 6 (Six) Hours As Needed for Severe Pain.      Last office visit with prescribing clinician: 6/11/2024   Last telemedicine visit with prescribing clinician: Visit date not found   Next office visit with prescribing clinician: 6/19/2025     UDS-5/9/2025.

## 2025-05-15 ENCOUNTER — OFFICE VISIT (OUTPATIENT)
Dept: PALLIATIVE CARE | Facility: CLINIC | Age: 80
End: 2025-05-15
Payer: MEDICARE

## 2025-05-15 ENCOUNTER — LAB (OUTPATIENT)
Dept: LAB | Facility: HOSPITAL | Age: 80
End: 2025-05-15
Payer: MEDICARE

## 2025-05-15 DIAGNOSIS — R11.2 NAUSEA AND VOMITING, UNSPECIFIED VOMITING TYPE: ICD-10-CM

## 2025-05-15 DIAGNOSIS — Z71.89 ACP (ADVANCE CARE PLANNING): ICD-10-CM

## 2025-05-15 DIAGNOSIS — C54.1 ENDOMETRIAL CANCER: ICD-10-CM

## 2025-05-15 DIAGNOSIS — G89.3 CANCER RELATED PAIN: ICD-10-CM

## 2025-05-15 DIAGNOSIS — Z51.81 THERAPEUTIC DRUG MONITORING: Primary | ICD-10-CM

## 2025-05-15 LAB
AMPHET+METHAMPHET UR QL: NEGATIVE
AMPHETAMINES UR QL: NEGATIVE
BARBITURATES UR QL SCN: NEGATIVE
BENZODIAZ UR QL SCN: NEGATIVE
BUPRENORPHINE SERPL-MCNC: NEGATIVE NG/ML
CANNABINOIDS SERPL QL: NEGATIVE
COCAINE UR QL: NEGATIVE
FENTANYL UR-MCNC: NEGATIVE NG/ML
METHADONE UR QL SCN: NEGATIVE
OPIATES UR QL: POSITIVE
OXYCODONE UR QL SCN: NEGATIVE
PCP UR QL SCN: NEGATIVE
TRICYCLICS UR QL SCN: NEGATIVE

## 2025-05-15 PROCEDURE — 80307 DRUG TEST PRSMV CHEM ANLYZR: CPT | Performed by: PHYSICIAN ASSISTANT

## 2025-05-15 RX ORDER — ONDANSETRON 8 MG/1
8 TABLET, ORALLY DISINTEGRATING ORAL EVERY 8 HOURS PRN
Qty: 30 TABLET | Refills: 3 | Status: SHIPPED | OUTPATIENT
Start: 2025-05-15

## 2025-05-15 NOTE — PROGRESS NOTES
Palliative Clinic Note      Name: Pamela Herrera  Age: 79 y.o.  Sex: female  : 1945  MRN: [id number]  Date of Service: 05/15/2025   Referring Physician: Junior Dunne*  GYN Oncologist: Dr. Santiago   Rad Oncologist: Dr. Dunne    Subjective:    Chief Complaint: Pain, nausea, diarrhea, fatigue    History of Present Illness: Pamela Herrera is a 79 y.o. female with past medical history significant for endometrial cancer, ESRD, hypertension who presents to the palliative clinic today to establish care.     Treatment summary: The patient was diagnosed and treated for endometrial cancer in . The patient developed pelvic recurrence in  and was treated with salvage radiation. Treatment course was complicated by ureteral obstruction requiring bilateral percutaneous nephrostomy tubes and ESRD requiring dialysis. Patient diagnosed with recurrent disease involving the lungs, liver, pelvis and vagina. The patient recently completed palliative radiation therapy to the right pelvic sidewall mass and the vaginal disease 25.      Pain: The patient has several sources of pain. The patient complains of pain in her vagina and pelvis secondary to metastatic disease. This has improved some starting radiation. The patient complains of left-sided sciatic pain that radiates from her hip down the left lower extremity. She reports episodes of this pain in the past. Worse now due to radiation and dialysis. The patient also complains of chronic bilateral knee pain due to arthritis. The patient is prescribe Norco 10 mg and takes 1/2 to a whole tablet every 6 hours as needed. On average the patient reports taking 3 tablets per day. She denies side effects from the pain medication. She takes additional Tylenol in between doses of Norco. She is also prescribed gabapentin 100 mg TID and has been taking this for several years. She uses topical Volteran PRN.     Other symptoms: The patient complains of intermittent  nausea with dry heaving. Worse with radiation. She is not prescribed an antiemetic. The patient's daughter gave her a Zofran 8 mg tablet and this relieved the patient's nausea. The patient states her appetite has improved. She admits to moderate fatigue. Her bowel movements are normally loose. She takes Imodium as needed.     Pyschosocial: The patient presents to the clinic with her daughter, Bianca. She lives with her daughter who is an RN at [employer]. The patient's  passed away in 2016. They have 4 children and 6 grand children. The patient was very involved in [employer] throughout her adult life. She enjoys cooking and gardening. No personal history of mental illness. The patient reports worsening depression and anxiety over the past several weeks due to not feeling well. However, she states she has never had a problem with either in the past. No use of alcohol, tobacco or illicit drugs.     Spiritual: Yazidism.     Goals: Maximize comfort, optimize function & psychosocial wellbeing, and promote advanced care planning.    The following portions of the patient's history were reviewed and updated as appropriate: allergies, current medications, past family history, past medical history, past social history, past surgical history and problem list.    Decisional capacity:Full  ORT-R: Low risk   PHQ-9: 15-19 (Moderately Severe Depression)  GEOVANNA: 14  ECOG: (2) Ambulatory and capable of self care, unable to carry out work activity, up and about > 50% or waking hours     Objective:    Constitutional: Awake, alert, sitting up in wheel chair, in no acute distress  Eyes: PERRLA, EOMS intact  HENT: NCAT, face symmetric  Neck: Supple, trachea midline  Respiratory: Nonlabored respirations  Cardiovascular: Significant edema in bilateral lower extremities   Gastrointestinal: Soft, no guarding  Musculoskeletal: Moves all extremities   Psychiatric: Appropriate affect, cooperative  Neurologic: Oriented x 3, Cranial  Nerves grossly intact to confrontation, speech clear  Skin: Cool dry, no rashes or wounds appreciated     Medication Counts: Instructed to bring controlled medications to all appointments.   I have reviewed the patient's [place] PDMP. RODRICK Req #[id number] .   UDS: Collected today. Results were appropriate.     Assessment & Plan:    1. Endometrial cancer  2. ACP (advance care planning)  - We explained what palliative care is and what it can offer the patient. Reinforced that palliative care is provided in collaboration with primary care provider and any specialty care providers.  Encouraged patient to continue to seek emergency medical treatment as needed for acute illness or injury. We discussed short-term goals which include improving quality of life and daily functioning. The patient understands that we will need hospice care at some point and I will help guide her and family through the process. Given the incurability of the disease, the patient understands the necessity for advanced care planning.  I assessed the patient's understanding of advanced care planning and explored the patient's past experiences. We discussed medical interventions the patient would want in the case she was unable to communicate. Provided patient and daughter DNR form as well as LW and planning guide.     3. Cancer related pain  - Patient is appropriate for opioid therapy due to cancer related pain. Daily function and quality of life improved with pain medication. Increase Norco 10 mg tablets q4h PRN. Side effects of the medication discussed at every visit. PA submitted to the insurance company. Palliative care will take over prescription of gabapentin as well as opioid therapy.     4. Therapeutic drug monitoring  - Urine Drug Screen per new patient visit. Results were appropriate.     5. Nausea and vomiting, unspecified vomiting type  - Start trial of ondansetron ODT (ZOFRAN-ODT) 8 MG disintegrating tablet; Place 1 tablet on the tongue  Every 8 (Eight) Hours As Needed for Nausea or Vomiting.      Code status: DNR  Advanced directives:  Provided LW and DNR forms during appointment    Return in about 1 month (around 6/15/2025) for Office Visit, Video visit.    I spent 60 minutes caring for Pamela Herrera on this date of service. This time includes time spent by me in the following activities: preparing for the visit, reviewing tests, obtaining and/or reviewing a separately obtained history, performing a medically appropriate examination and/or evaluation , counseling and educating the patient/family/caregiver, ordering medications, tests, or procedures, documenting information in the medical record, independently interpreting results and communicating that information with the patient/family/caregiver, and care coordination    Tasha Pedraza PA-C  05/15/2025    Medication Date Filled # Filled Count Used # Days  JAMIA   [place] 10 4/24/25 40 -- -- -- --   Gabapentin 100 3/10/25 270 -- -- -- --

## 2025-05-20 ENCOUNTER — TELEPHONE (OUTPATIENT)
Dept: INTERNAL MEDICINE | Facility: CLINIC | Age: 80
End: 2025-05-20
Payer: MEDICARE

## 2025-05-20 NOTE — TELEPHONE ENCOUNTER
PATIENT IS CALLING TO SEE IF SHE CAN INCREASE HER NORCO TO 2 TABLETS EVERY FOUR HOURS INSTEAD OF 1/2 TABLET.  [facility]

## 2025-05-22 DIAGNOSIS — G89.29 CHRONIC PAIN OF BOTH KNEES: ICD-10-CM

## 2025-05-22 DIAGNOSIS — M25.561 CHRONIC PAIN OF BOTH KNEES: ICD-10-CM

## 2025-05-22 DIAGNOSIS — C54.1 ENDOMETRIAL CANCER: ICD-10-CM

## 2025-05-22 DIAGNOSIS — M25.562 CHRONIC PAIN OF BOTH KNEES: ICD-10-CM

## 2025-05-22 RX ORDER — HYDROCODONE BITARTRATE AND ACETAMINOPHEN 10; 325 MG/1; MG/1
1 TABLET ORAL EVERY 4 HOURS PRN
Qty: 180 TABLET | Refills: 0 | Status: SHIPPED | OUTPATIENT
Start: 2025-05-22 | End: 2025-06-21

## 2025-05-22 NOTE — TELEPHONE ENCOUNTER
RODRICK #: [id number]     Medication requested: HYDROcodone-acetaminophen (NORCO)  MG per tablet     Last fill date:  5/13/24    Last appointment:5/15/25    Next appointment: 6/13/25

## 2025-06-09 ENCOUNTER — HOSPITAL ENCOUNTER (OUTPATIENT)
Facility: HOSPITAL | Age: 80
Setting detail: RADIATION/ONCOLOGY SERIES
End: 2025-06-09
Payer: MEDICARE

## 2025-06-11 ENCOUNTER — OFFICE VISIT (OUTPATIENT)
Age: 80
End: 2025-06-11
Payer: MEDICARE

## 2025-06-11 VITALS
TEMPERATURE: 97.9 F | DIASTOLIC BLOOD PRESSURE: 73 MMHG | WEIGHT: 175 LBS | BODY MASS INDEX: 32.2 KG/M2 | HEIGHT: 62 IN | HEART RATE: 70 BPM | SYSTOLIC BLOOD PRESSURE: 135 MMHG | OXYGEN SATURATION: 90 % | RESPIRATION RATE: 16 BRPM

## 2025-06-11 DIAGNOSIS — C54.1 ENDOMETRIAL CANCER: Primary | ICD-10-CM

## 2025-06-11 PROCEDURE — G0463 HOSPITAL OUTPT CLINIC VISIT: HCPCS

## 2025-06-11 NOTE — PROGRESS NOTES
FOLLOW UP NOTE    PATIENT:                                                      Pamela Herrera  MEDICAL RECORD #:                        [id number]  :                                                          1945  COMPLETION DATE:    2025  DIAGNOSIS:     Recurrent endometrial cancer      BRIEF HISTORY: Ms. Herrera is an 79-year-old female with a known diagnosis of recurrent endometrial cancer with a large tumor involving the right pelvic sidewall as well as the upper vagina.  She has completed multiple prior courses of radiation therapy, and I treated her with a palliative short course of radiation consisting of 14 Abad over 4 fractions and a hypofractionated treatment manner, 6 weeks ago.  At the time of her initial treatment she was experiencing rather intractable pain and was able to sit down due to discomfort.  She was also experiencing bleeding.  She states that her bleeding has since resolved.  Her pain has significantly decreased, and now she is able to ambulate with significantly less discomfort.  She returns to clinic today for reevaluation.  Although her symptoms are improved, she continues to require narcotics which has caused her to feel persistently tired as well as she has been experiencing intermittent constipation.    MEDICATIONS: Medication reconciliation for the patient was reviewed and confirmed in the electronic medical record.    Review of Systems   Constitutional:  Positive for appetite change and fatigue. Negative for chills, fever and unexpected weight change.   HENT:  Negative.     Eyes: Negative.    Respiratory:  Positive for cough and shortness of breath. Negative for chest tightness.    Cardiovascular:  Positive for leg swelling. Negative for chest pain.   Gastrointestinal:  Positive for abdominal pain, diarrhea and nausea. Negative for vomiting.   Endocrine: Negative.    Genitourinary:  Positive for pelvic pain and vaginal discharge (very odorous per daughter, clear). Negative  "for vaginal bleeding.         Nephrostomy tubes in place     Musculoskeletal: Negative.    Skin: Negative.        Karnofsky score: 80     Physical Exam  Vitals and nursing note reviewed.   Constitutional:       General: She is not in acute distress.     Appearance: She is well-developed.   HENT:      Head: Normocephalic and atraumatic.   Eyes:      Conjunctiva/sclera: Conjunctivae normal.      Pupils: Pupils are equal, round, and reactive to light.   Cardiovascular:      Rate and Rhythm: Normal rate and regular rhythm.      Heart sounds: No murmur heard.     No friction rub.   Pulmonary:      Effort: Pulmonary effort is normal.      Breath sounds: Normal breath sounds. No wheezing.   Abdominal:      General: Bowel sounds are normal. There is no distension.      Palpations: Abdomen is soft. There is no mass.      Tenderness: There is no abdominal tenderness.   Genitourinary:     Comments: She continues to have studding throughout the anterior vaginal wall and left lower vaginal wall extending from the introitus to the vaginal apex, although lesions are more plaque-like in nature today and are substantially smaller than her exam 2 months ago.  She is not actively bleeding today.  She remains tender on examination.  Musculoskeletal:         General: Normal range of motion.      Cervical back: Normal range of motion and neck supple.      Comments: Bilateral percutaneous nephrostomy tubes   Lymphadenopathy:      Cervical: No cervical adenopathy.   Skin:     General: Skin is warm and dry.   Neurological:      Mental Status: She is alert and oriented to person, place, and time.   Psychiatric:         Behavior: Behavior normal.         Thought Content: Thought content normal.         Judgment: Judgment normal.         VITAL SIGNS:   Vitals:    06/11/25 0909   BP: 135/73   Pulse: 70   Resp: 16   Temp: 97.9 °F (36.6 °C)   TempSrc: Oral   SpO2: 90%   Weight: 79.4 kg (175 lb)  Comment: stated   Height: 157.5 cm (62.01\") "   PainSc: Comment: UNABLE TO PROVIDE NUMBER   PainLoc: Abdomen             Karnofsky score: 80         The following portions of the patient's history were reviewed and updated as appropriate: allergies, current medications, past family history, past medical history, past social history, past surgical history and problem list.         Diagnoses and all orders for this visit:    1. Endometrial cancer (Primary)         IMPRESSION: Mrs. Herrera is an 79 current endometrial cancer and a large right pelvic sidewall mass with vaginal involvement and lower extension she continues to have.  Significant pain that is impacting her quality of life, and at this point if we were able to reduce her pain to where she has less of a need for narcotics, that would be ideal.  I recommend that we treat her with a second short course of palliative radiation therapy that we will deliver in a hypofractionated treatment format of 14 Gray over 4 fractions within 48 hours, also known as a quad shot.  She seemed to have rather significant improvement after her last course, so I am hopeful that this will further improve her discomfort.  She has a birthday within the next 2 weeks, and would like to receive these treatments shortly thereafter, therefore I will schedule her for a treatment simulation and planning session next week with the goals of beginning her radiation treatments during the second week of July.  We again discussed the potential risks and benefits, and she signed informed consent.    RECOMMENDATIONS:    1.  Plan for second quad shot of palliative radiation therapy to the right pelvic sidewall and lower vagina, with an anticipated dose of 14 Gray over 4 fractions  2.  Return to clinic in 1 week for radiation treatment planning    I spent a total of 30 minutes on todays visit, with more than 20 minutes in direct face to face communication, and the remainder of the time spent in reviewing the relevant history, records, available  imaging, and for documentation.    Return in about 1 week (around 6/18/2025) for Radiation Simulation.    Junior Dunne MD

## 2025-06-13 ENCOUNTER — TELEPHONE (OUTPATIENT)
Dept: PALLIATIVE CARE | Facility: CLINIC | Age: 80
End: 2025-06-13
Payer: MEDICARE

## 2025-06-13 ENCOUNTER — OFFICE VISIT (OUTPATIENT)
Dept: PALLIATIVE CARE | Facility: CLINIC | Age: 80
End: 2025-06-13
Payer: MEDICARE

## 2025-06-13 VITALS
TEMPERATURE: 97.7 F | SYSTOLIC BLOOD PRESSURE: 132 MMHG | OXYGEN SATURATION: 93 % | RESPIRATION RATE: 18 BRPM | DIASTOLIC BLOOD PRESSURE: 68 MMHG | HEART RATE: 63 BPM | BODY MASS INDEX: 31.09 KG/M2 | WEIGHT: 170 LBS

## 2025-06-13 DIAGNOSIS — G89.3 CANCER RELATED PAIN: ICD-10-CM

## 2025-06-13 DIAGNOSIS — R09.02 HYPOXIA: ICD-10-CM

## 2025-06-13 DIAGNOSIS — C54.1 ENDOMETRIAL CANCER: Primary | ICD-10-CM

## 2025-06-13 RX ORDER — MULTIVIT-MIN/IRON/FOLIC ACID/K 18-600-40
1 CAPSULE ORAL
COMMUNITY
Start: 2025-04-25 | End: 2026-04-24

## 2025-06-13 NOTE — TELEPHONE ENCOUNTER
Caller: KRYSTIAN    Relationship: Other    Best call back number: [phone]    What form or medical record are you requesting:   COPY OF WALK TEST THAT WAS COMPLETED TODAY PER CALLER    Who is requesting this form or medical record from you:   KRYSTIAN    How would you like to receive the form or medical records (pick-up, mail, fax): FAX  If fax, what is the fax number: [phone]    Timeframe paperwork needed: ASAP    Additional notes: KRYSTIAN RECEIVED AN ORDER FOR OXYGEN AND CHECKED EPIC BUT WILL NEED A COPY OF A WALK TEST.

## 2025-06-13 NOTE — PROGRESS NOTES
"     Palliative Clinic Note      Name: Pamela Herrera  Age: 79 y.o.  Sex: female  : 1945  MRN: [id number]  Date of Service: 2025   GYN Oncologist: Dr. Santiago   Rad Oncologist: Dr. Dunne    Subjective:    Chief Complaint: Shortness of breath, decreased appetite    History of Present Illness: Pamela Herrera is a 79 y.o. female with past medical history significant for endometrial cancer, ESRD, hypertension who presents to the palliative clinic today as a follow up for pain and symptom management.     Treatment summary: The patient was diagnosed and treated for endometrial cancer in . The patient developed pelvic recurrence in  and was treated with salvage radiation. Treatment course was complicated by ureteral obstruction requiring bilateral percutaneous nephrostomy tubes and ESRD requiring dialysis. Patient diagnosed with recurrent disease involving the lungs, liver, pelvis and vagina. The patient received palliative radiation therapy to the right pelvic sidewall mass and the vaginal disease in 2025. Plan for another course in the coming weeks.      Pain: The patient has several sources of pain. The patient complains of pain in her vagina and pelvis secondary to metastatic disease. This has improved some starting radiation. The patient complains of left-sided sciatic pain that radiates from her hip down the left lower extremity. This has also improved. The patient also complains of chronic bilateral knee pain due to arthritis. The patient is prescribed Norco 10 mg and takes 1/2 to a whole tablet every 4 hours as needed. She denies side effects from the pain medication. She takes additional Tylenol in between doses of Norco. She is also prescribed gabapentin 100 mg TID and has been taking this for several years. She uses topical Volteran PRN.      Other symptoms: The patient complains of intermittent \"smothering\" sensation that is new this week. It occurs at rest. She reports a productive " cough. The patient is taking Zyrtec and Mucinex for her symptoms. Recently traveled to [place]. The patient's nausea is controlled with Zofran ODT PRN. She has had to take it about 2 x in the last week. The patient states her appetite has decreased. She is eating smaller portions. The patient is not concerned with this.  She admits to moderate fatigue. Her bowel movements are normal. Last BM was yesterday.      Pyschosocial: The patient presents to the clinic with her daughter, Bianca. She lives with her daughter who is an RN at [employer]. The patient's  passed away in 2016. They have 4 children and 6 grand children. The patient was very involved in [employer] throughout her adult life. She enjoys cooking and gardening. No personal history of mental illness. The patient reports worsening depression and anxiety over the past several weeks due to not feeling well. However, she states she has never had a problem with either in the past. No use of alcohol, tobacco or illicit drugs.      Spiritual: Sabianist.      Goals: Maximize comfort, optimize function & psychosocial wellbeing, and promote advanced care planning.    The following portions of the patient's history were reviewed and updated as appropriate: allergies, current medications, past family history, past medical history, past social history, past surgical history and problem list.    ORT-R: Low risk  Decisional capacity: Full  ECOG: (3) Capable of limited self-care, confined to bed or chair > 50% of waking hours     Objective:    /68   Pulse 63   Temp 97.7 °F (36.5 °C) (Temporal)   Resp 18   Wt 77.1 kg (170 lb)   SpO2 93%   BMI 31.09 kg/m²     Constitutional: Awake, alert, normal gait, sitting up in exam chair, in no acute distress  Eyes: PERRLA, EOMS intact  HENT: NCAT, face symmetric  Neck: Supple, trachea midline  Respiratory: Nonlabored respirations  Cardiovascular: RRR, no edema observed  Gastrointestinal: Soft, no  guarding  Musculoskeletal: Moves all extremities   Psychiatric: Appropriate affect, cooperative  Neurologic: Oriented x 3, Cranial Nerves grossly intact to confrontation, speech clear  Skin: Cool dry, no rashes or wounds appreciated     Medication Counts: Reviewed. See bottom of note for details. Did not bring medication to appointment  I have reviewed the patient's [place] PDMP. RODRICK Req #[id number] .   UDS: ESRD.    Assessment & Plan:    1. Endometrial cancer  - The patient received palliative radiation therapy to the right pelvic sidewall mass and the vaginal disease in 5/2025. Plan for another course in the coming weeks. Low threshold for hospice referral.     2. Cancer related pain  - Patient is appropriate for opioid therapy due to cancer related pain. Daily function and quality of life improved with pain medication. Continue Norco  mg tab q4h PRN. No refill needed today. Side effects of the medication discussed at every visit. Patient was encouraged to continue bowel regimen of daily stool softeners, prn laxatives, and diet modifications.    3. Hypoxia  - Six minute walk test performed in the office today. Patient oxygen saturations dropped < 89. Will place order for supplemental oxygen and fax to [facility]. Recommend patient continue Mucinex and Zyrtec for congestion.     Code status: DNR  Advanced directives: Provided LW and DNR forms during appointment    Return in about 6 weeks (around 7/25/2025) for Office Visit.    I spent 30 minutes caring for Pamela Herrera on this date of service. This time includes time spent by me in the following activities: preparing for the visit, reviewing tests, obtaining and/or reviewing a separately obtained history, performing a medically appropriate examination and/or evaluation , counseling and educating the patient/family/caregiver, ordering medications, tests, or procedures, documenting information in the medical record, independently interpreting results and  communicating that information with the patient/family/caregiver, and care coordination    Tasha Pedraza PA-C  06/13/2025    Medication Date Filled # Filled Count Used # Days  JAMIA   [place] 10 5/22/25 180 -- -- -- --

## 2025-06-17 ENCOUNTER — HOSPITAL ENCOUNTER (OUTPATIENT)
Facility: HOSPITAL | Age: 80
Discharge: HOME | End: 2025-06-17

## 2025-06-17 PROCEDURE — 77332 RADIATION TREATMENT AID(S): CPT | Performed by: RADIOLOGY

## 2025-06-17 PROCEDURE — 77334 RADIATION TREATMENT AID(S): CPT | Performed by: RADIOLOGY

## 2025-06-17 PROCEDURE — 77470 SPECIAL RADIATION TREATMENT: CPT | Performed by: RADIOLOGY

## 2025-06-19 ENCOUNTER — TELEPHONE (OUTPATIENT)
Dept: PALLIATIVE CARE | Facility: CLINIC | Age: 80
End: 2025-06-19
Payer: MEDICARE

## 2025-06-19 ENCOUNTER — OFFICE VISIT (OUTPATIENT)
Dept: INTERNAL MEDICINE | Facility: CLINIC | Age: 80
End: 2025-06-19
Payer: MEDICARE

## 2025-06-19 VITALS
WEIGHT: 174.16 LBS | TEMPERATURE: 98.1 F | OXYGEN SATURATION: 94 % | BODY MASS INDEX: 32.05 KG/M2 | HEART RATE: 78 BPM | DIASTOLIC BLOOD PRESSURE: 60 MMHG | SYSTOLIC BLOOD PRESSURE: 120 MMHG | HEIGHT: 62 IN

## 2025-06-19 DIAGNOSIS — N18.6 ESRD (END STAGE RENAL DISEASE): ICD-10-CM

## 2025-06-19 DIAGNOSIS — I77.0 AV FISTULA: ICD-10-CM

## 2025-06-19 DIAGNOSIS — Z00.00 MEDICARE ANNUAL WELLNESS VISIT, SUBSEQUENT: Primary | ICD-10-CM

## 2025-06-19 DIAGNOSIS — Z93.6 NEPHROSTOMY STATUS: ICD-10-CM

## 2025-06-19 DIAGNOSIS — N18.6 BENIGN HYPERTENSION WITH END-STAGE RENAL DISEASE: ICD-10-CM

## 2025-06-19 DIAGNOSIS — Z74.09 IMPAIRED FUNCTIONAL MOBILITY AND ENDURANCE: ICD-10-CM

## 2025-06-19 DIAGNOSIS — C78.01 MALIGNANT NEOPLASM METASTATIC TO BOTH LUNGS: ICD-10-CM

## 2025-06-19 DIAGNOSIS — I12.0 BENIGN HYPERTENSION WITH END-STAGE RENAL DISEASE: ICD-10-CM

## 2025-06-19 DIAGNOSIS — C78.02 MALIGNANT NEOPLASM METASTATIC TO BOTH LUNGS: ICD-10-CM

## 2025-06-19 DIAGNOSIS — Z91.81 AT HIGH RISK FOR FALLS: ICD-10-CM

## 2025-06-19 DIAGNOSIS — C54.1 ENDOMETRIAL CANCER: ICD-10-CM

## 2025-06-19 PROBLEM — E46 UNSPECIFIED PROTEIN-CALORIE MALNUTRITION: Status: ACTIVE | Noted: 2019-01-22

## 2025-06-19 PROBLEM — G62.82: Status: ACTIVE | Noted: 2019-01-30

## 2025-06-19 PROBLEM — E55.9 VITAMIN D DEFICIENCY, UNSPECIFIED: Status: ACTIVE | Noted: 2022-01-11

## 2025-06-19 RX ORDER — AMLODIPINE BESYLATE 10 MG/1
10 TABLET ORAL DAILY
Qty: 90 TABLET | Refills: 3 | Status: SHIPPED | OUTPATIENT
Start: 2025-06-19

## 2025-06-19 RX ORDER — CARVEDILOL 25 MG/1
25 TABLET ORAL 2 TIMES DAILY WITH MEALS
Qty: 180 TABLET | Refills: 3 | Status: SHIPPED | OUTPATIENT
Start: 2025-06-19

## 2025-06-19 NOTE — PROGRESS NOTES
Subjective   The ABCs of the Annual Wellness Visit  Medicare Wellness Visit      Pamela Herrera is a 79 y.o. patient who presents for a Medicare Wellness Visit.    The following portions of the patient's history were reviewed and   updated as appropriate: allergies, current medications, past family history, past medical history, past social history, past surgical history, and problem list.    Compared to one year ago, the patient's physical   health is worse.  Compared to one year ago, the patient's mental   health is the same.    Recent Hospitalizations:  She was not admitted to the hospital during the last year.     Current Medical Providers:  Patient Care Team:  Michelle Wynn MD as PCP - General (Family Medicine)  Ok Kunz MD as Surgeon (Orthopedic Surgery)  Nikko Morel MD, CYNTHIA as Consulting Physician (Nephrology)  Yomaira Santiago MD as Consulting Physician (Gynecologic Oncology)  Stefany Allen MD as Consulting Physician (Urology)  Junior Dunne MD as Consulting Physician (Radiation Oncology)  Yomaira Santiago MD as Referring Physician (Gynecologic Oncology)    Outpatient Medications Prior to Visit   Medication Sig Dispense Refill    acetaminophen (TYLENOL) 650 MG 8 hr tablet Take 1 tablet by mouth Every 8 (Eight) Hours As Needed for Mild Pain.      betamethasone valerate (VALISONE) 0.1 % cream betamethasone valerate 0.1 % topical cream   APPLY A THIN LAYER TO THE AFFECTED AREA(S) BY TOPICAL ROUTE ONCE DAILY X 7 DAYS THEN ONCE WEEKLY PRN      CALCIUM PO Take  by mouth.      Cholecalciferol (Vitamin D) 50 MCG (2000 UT) capsule Take 1 mcg by mouth.      COLLAGEN-VITAMIN C PO Take  by mouth.      diclofenac (VOLTAREN) 1 % gel gel APPLY TO APPROPRIATE AREA AS DIRECTED 3 TIMES A  g 3    fluconazole (DIFLUCAN) 150 MG tablet TAKE 1 TABLET PRIOR TO NEPHROSTOMY TUBE EXCHANGE AND 1 TABLET 4 DAYS AFTER.      fluticasone (FLONASE) 50 MCG/ACT nasal spray 1 spray  "into the nostril(s) as directed by provider Daily. 3 bottle 3    gabapentin (NEURONTIN) 100 MG capsule Take 1 capsule by mouth 3 (Three) Times a Day. 270 capsule 1    HYDROcodone-acetaminophen (NORCO)  MG per tablet Take 1 tablet by mouth Every 4 (Four) Hours As Needed for Severe Pain for up to 30 days. 180 tablet 0    lidocaine-prilocaine (EMLA) 2.5-2.5 % cream APPLY SMALL AMOUNT TO ACCESS SITE (AVF) 1 TO 2 HOURS BEFORE DIALYSIS. COVER WITH OCCLUSIVE DRESSING (SARAN WRAP) 30 g 11    Misc Natural Products (OSTEO BI-FLEX ADV JOINT SHIELD) tablet Take  by mouth.      ondansetron ODT (ZOFRAN-ODT) 8 MG disintegrating tablet Place 1 tablet on the tongue Every 8 (Eight) Hours As Needed for Nausea or Vomiting. 30 tablet 3    Prenatal Vit-Fe Fumarate-FA (PRENATAL PO) Take  by mouth.      Probiotic Product (PROBIOTIC-10 PO) Take  by mouth.      Skin Protectants, Misc. (InterDry 10\"x144\") sheet Apply 1 application topically Daily As Needed (skin irritation). 10 each 11    Velphoro 500 MG chewable tablet CRUSH OR CHEW AND SWALLOW 1 TABLET 3 TIMES A DAY WITH MEALS      amLODIPine (NORVASC) 10 MG tablet TAKE 1 TABLET BY MOUTH DAILY FOR HIGH BLOOD PRESSURE 90 tablet 0    carvedilol (COREG) 25 MG tablet TAKE 1 TABLET BY MOUTH 2 (TWO) TIMES A DAY - FOR HIGH BLOOD PRESSURE DISORDER 180 tablet 0    lidocaine-prilocaine (EMLA) 2.5-2.5 % cream       Ondansetron HCl (ZOFRAN PO) 4 mg.       No facility-administered medications prior to visit.     Opioid medication/s are on active medication list.  and I have evaluated her active treatment plan and pain score trends (see table).  Vitals:    06/19/25 0821   PainSc: 8      I have reviewed the chart for potential of high risk medication and harmful drug interactions in the elderly.        Aspirin is not on active medication list.  Aspirin use is not indicated based on review of current medical condition/s. Risk of harm outweighs potential benefits.  .    Patient Active Problem List " "  Diagnosis    ESRD (end stage renal disease)    Anemia, chronic renal failure, stage 5    Benign hypertension with end-stage renal disease    Primary osteoarthritis involving multiple joints    Endometrial cancer    Lymphedema of both lower extremities    Nephrostomy status    Environmental and seasonal allergies    AV fistula    Primary osteoarthritis of both knees    Sciatica of left side    Influenza vaccination declined    Pneumococcal vaccination declined    Secondary hyperparathyroidism of renal origin    Ureteral stenosis    Radiation-induced polyneuropathy    Unspecified protein-calorie malnutrition    Vitamin D deficiency, unspecified    Malignant neoplasm metastatic to both lungs     Advance Care Planning Advance Directive is on file.  ACP discussion was declined by the patient. Patient has an advance directive in EMR which is still valid.             Objective   Vitals:    06/19/25 0821   BP: 120/60   Pulse: 78   Temp: 98.1 °F (36.7 °C)   SpO2: 94%   Weight: 79 kg (174 lb 2.6 oz)   Height: 157.5 cm (62.01\")   PainSc: 8        Estimated body mass index is 31.84 kg/m² as calculated from the following:    Height as of this encounter: 157.5 cm (62.01\").    Weight as of this encounter: 79 kg (174 lb 2.6 oz).    BMI is >= 30 and <35. (Class 1 Obesity). The following options were offered after discussion;: none (medical contraindication)         Does the patient have evidence of cognitive impairment? No                                                                                                Health  Risk Assessment    Smoking Status:  Social History     Tobacco Use   Smoking Status Never   Smokeless Tobacco Never     Alcohol Consumption:  Social History     Substance and Sexual Activity   Alcohol Use Never       Fall Risk Screen  STEADI Fall Risk Assessment was completed, and patient is at MODERATE risk for falls. Assessment completed on:6/19/2025    Depression Screening   Little interest or pleasure in " doing things? Not at all   Feeling down, depressed, or hopeless? Not at all   PHQ-2 Total Score 0      Health Habits and Functional and Cognitive Screenin/19/2025     8:17 AM   Functional & Cognitive Status   Do you have difficulty preparing food and eating? No   Do you have difficulty bathing yourself, getting dressed or grooming yourself? No   Do you have difficulty using the toilet? No   Do you have difficulty moving around from place to place? Yes   Do you have trouble with steps or getting out of a bed or a chair? No   Current Diet Other   Dental Exam Not up to date   Eye Exam Up to date   Exercise (times per week) 1 times per week   Current Exercises Include Other   Do you need help using the phone?  No   Are you deaf or do you have serious difficulty hearing?  Yes   Do you need help to go to places out of walking distance? Yes   Do you need help shopping? Yes   Do you need help preparing meals?  No   Do you need help with housework?  Yes   Do you need help with laundry? Yes   Do you need help taking your medications? No   Do you need help managing money? No   Do you ever drive or ride in a car without wearing a seat belt? No   Have you felt unusual stress, anger or loneliness in the last month? No   Who do you live with? Child   If you need help, do you have trouble finding someone available to you? No   Have you been bothered in the last four weeks by sexual problems? No   Do you have difficulty concentrating, remembering or making decisions? Yes           Age-appropriate Screening Schedule:  Refer to the list below for future screening recommendations based on patient's age, sex and/or medical conditions. Orders for these recommended tests are listed in the plan section. The patient has been provided with a written plan.    Health Maintenance List  Health Maintenance   Topic Date Due    ANNUAL WELLNESS VISIT  2026    TDAP/TD VACCINES (2 - Td or Tdap) 10/11/2028    Hepatitis B  Discontinued     "COVID-19 Vaccine  Discontinued    RSV Vaccine - Adults  Discontinued    INFLUENZA VACCINE  Discontinued    Pneumococcal Vaccine 50+  Discontinued    MAMMOGRAM  Discontinued    HEMOGLOBIN A1C  Discontinued    URINE MICROALBUMIN-CREATININE RATIO (uACR)  Discontinued    DXA SCAN  Discontinued    ZOSTER VACCINE  Discontinued    COLORECTAL CANCER SCREENING  Discontinued                                                                                                                                                CMS Preventative Services Quick Reference  Risk Factors Identified During Encounter  Fall Risk-High or Moderate: Information on Fall Prevention Shared in After Visit Summary and Sit to Stand Exercise Information Shared in After Visit Summary    The above risks/problems have been discussed with the patient.  Pertinent information has been shared with the patient in the After Visit Summary.  An After Visit Summary and PPPS were made available to the patient.    Follow Up:   Next Medicare Wellness visit to be scheduled in 1 year.         Additional E&M Note during same encounter follows:  Patient has additional, significant, and separately identifiable condition(s)/problem(s) that require work above and beyond the Medicare Wellness Visit     Chief Complaint  Medicare Wellness-subsequent and Cancer    Subjective   Here with granddaughter  Was able to go to [place] to see brother, ate at [employer] as I encouraged daughter to do and they got to see the [facility]  Pain medicine adjusted taking more than she likes but helping with pain  No constipation  Still having shortness of breath   Waiting on oxygen  Still doing dialysis 3x week      Pamela is also being seen today for additional medical problem/s.                Objective   Vital Signs:  /60   Pulse 78   Temp 98.1 °F (36.7 °C)   Ht 157.5 cm (62.01\")   Wt 79 kg (174 lb 2.6 oz)   SpO2 94%   BMI 31.84 kg/m²   Physical Exam  Vitals and nursing note " reviewed.   Constitutional:       General: She is not in acute distress.     Appearance: Normal appearance. She is well-developed and well-groomed. She is obese. She is ill-appearing (chronically ill not acute). She is not toxic-appearing or diaphoretic.      Comments: In wheelchair. Urine bag attached to right leg.    HENT:      Head: Normocephalic and atraumatic.      Right Ear: Hearing normal.      Left Ear: Hearing normal.   Eyes:      General: Lids are normal. No scleral icterus.     Extraocular Movements: Extraocular movements intact.   Neck:      Trachea: Phonation normal.   Cardiovascular:      Rate and Rhythm: Normal rate and regular rhythm.   Pulmonary:      Effort: Pulmonary effort is normal.      Breath sounds: Normal breath sounds.   Musculoskeletal:      Cervical back: Neck supple.   Skin:     Coloration: Skin is not jaundiced or pale.   Neurological:      General: No focal deficit present.      Mental Status: She is alert and oriented to person, place, and time.      Motor: Motor function is intact.   Psychiatric:         Attention and Perception: Attention and perception normal.         Mood and Affect: Mood and affect normal.         Speech: Speech normal.         Behavior: Behavior normal. Behavior is cooperative.         Thought Content: Thought content normal.         Cognition and Memory: Cognition and memory normal.         Judgment: Judgment normal.         The following data was reviewed by: Michelle Wynn MD on 06/19/2025:  NM PET/CT Skull Base to Mid Thigh (04/17/2025 09:03)   Progress Notes by Tasha Pedraza PA-C (06/13/2025 09:00)   Reviewed 6/12/25 oncology note, Dr. Yomaira Santiago         Assessment and Plan     Diagnoses and all orders for this visit:    1. Medicare annual wellness visit, subsequent (Primary)    2. Endometrial cancer  Overview:  S/p hysterectomy and radiation treatment    Assessment & Plan:  Continue with palliative care, pain managed with medicines plus  radiation.       3. Malignant neoplasm metastatic to both lungs  Assessment & Plan:  Follow up on oxygen, qualified per palliative notes. I also sent the MA from palliative to let her know patient has not yet heard about her oxygen supply from [employer].      4. Benign hypertension with end-stage renal disease  -     amLODIPine (NORVASC) 10 MG tablet; Take 1 tablet by mouth Daily. for high blood pressure  Dispense: 90 tablet; Refill: 3  -     carvedilol (COREG) 25 MG tablet; Take 1 tablet by mouth 2 (Two) Times a Day With Meals.  Dispense: 180 tablet; Refill: 3    5. ESRD (end stage renal disease)  Assessment & Plan:  Complicates all aspects of care. Follow up with nephrology      6. AV fistula  Overview:  Left upper extremity.    Assessment & Plan:  Follow up with nephrology continue hd.      7. Nephrostomy status  Overview:  bilateral    Assessment & Plan:  Stable; follow up with urology      8. At high risk for falls  Comments:  information via AVS    9. Impaired functional mobility and endurance  Comments:  continue wheelchair as needed, oxygen pending per palliative           I spent 30 minutes caring for Pamela on this date of service. This time includes time spent by me in the following activities:preparing for the visit, reviewing tests, obtaining and/or reviewing a separately obtained history, performing a medically appropriate examination and/or evaluation , counseling and educating the patient/family/caregiver, ordering medications, tests, or procedures, and documenting information in the medical record    I spent 20 minutes on the separately reported service of [id number]. This time is not included in the time used to support the E/M service also reported today.     Follow Up   Return in about 1 year (around 6/20/2026) for Medicare Wellness, sooner if needed.  Patient was given instructions and counseling regarding her condition or for health maintenance advice. Please see specific information pulled into the  AVS if appropriate.    Patient has been erroneously marked as diabetic. Based on the available clinical information, she does not have diabetes and should therefore be excluded from diabetic health maintenance and quality measures for the remainder of the reporting period.

## 2025-06-19 NOTE — TELEPHONE ENCOUNTER
Provider: ESPERANZA    Caller: RUFINA BOLAND    Relationship to Patient:     Reason for Call: OXYGEN TESTING THAT  WAS PERFORMED-RUFINA BOLAND WOULD LIKE TO SPEAK WITH CLINICAL ABOUT THIS PATIENT. PLEASE CALL -  [phone]

## 2025-06-19 NOTE — TELEPHONE ENCOUNTER
Hub staff attempted to follow warm transfer process and was unsuccessful     Caller: GRAY    Relationship to patient:     Best call back number:     [phone]       Patient is needing: PT IS NEEDING A COMPLETED WALK TEST SENT TO HER THEY ARE NEEDING THE THIRD RESULT WHERE THEY APPLIED OXYGEN.     [phone]

## 2025-06-19 NOTE — ASSESSMENT & PLAN NOTE
Follow up on oxygen, qualified per palliative notes. I also sent the MA from palliative to let her know patient has not yet heard about her oxygen supply from [employer].

## 2025-06-19 NOTE — TELEPHONE ENCOUNTER
Hub staff attempted to follow warm transfer process and was unsuccessful     Caller: RUFINA MIRZA - formerly [facility]    Relationship to patient:     Best call back number: [phone]     Patient is needing: RUFINA FROM [facility] IS CALLING TO TALK TO ESPERANZA ABOUT PT'S OXYGEN EQUIPMENT. PLEASE CALL HIM BACK SO THEY CAN GET IT TO PT ASAP.

## 2025-06-19 NOTE — PATIENT INSTRUCTIONS
Medicare Wellness  Personal Prevention Plan of Service     Date of Office Visit:    Encounter Provider:  Michelle Wynn MD  Place of Service:  [facility]  Patient Name: Pamela Herrera  :  1945    As part of the Medicare Wellness portion of your visit today, we are providing you with this personalized preventive plan of services (PPPS). This plan is based upon recommendations of the [place] Preventive Services Task Force (USPSTF) and the Advisory Committee on Immunization Practices (ACIP).    This lists the preventive care services that should be considered, and provides dates of when you are due. Items listed as completed are up-to-date and do not require any further intervention.    Health Maintenance   Topic Date Due   • ANNUAL WELLNESS VISIT  2025   • TDAP/TD VACCINES (2 - Td or Tdap) 10/11/2028   • Hepatitis B  Discontinued   • COVID-19 Vaccine  Discontinued   • RSV Vaccine - Adults  Discontinued   • INFLUENZA VACCINE  Discontinued   • Pneumococcal Vaccine 50+  Discontinued   • MAMMOGRAM  Discontinued   • HEMOGLOBIN A1C  Discontinued   • URINE MICROALBUMIN-CREATININE RATIO (uACR)  Discontinued   • DXA SCAN  Discontinued   • ZOSTER VACCINE  Discontinued   • COLORECTAL CANCER SCREENING  Discontinued       No orders of the defined types were placed in this encounter.      No follow-ups on file.        Health Maintenance After Age 65    After age 65, you are at a higher risk for certain long-term diseases and infections as well as injuries from falls. Falls are a major cause of broken bones and head injuries in people who are older than age 65. Getting regular preventive care can help to keep you healthy and well. Preventive care includes getting regular testing and making lifestyle changes as recommended by your health care provider. Talk with your health care provider about:  Which screenings and tests you should have. A screening is a test that checks for  a disease when you have no symptoms.  A diet and exercise plan that is right for you.    What should I know about screenings and tests to prevent falls?  Screening and testing are the best ways to find a health problem early. Early diagnosis and treatment give you the best chance of managing medical conditions that are common after age 65. Certain conditions and lifestyle choices may make you more likely to have a fall. Your health care provider may recommend:  Regular vision checks. Poor vision and conditions such as cataracts can make you more likely to have a fall. If you wear glasses, make sure to get your prescription updated if your vision changes.  Medicine review. Work with your health care provider to regularly review all of the medicines you are taking, including over-the-counter medicines. Ask your health care provider about any side effects that may make you more likely to have a fall. Tell your health care provider if any medicines that you take make you feel dizzy or sleepy.  Strength and balance checks. Your health care provider may recommend certain tests to check your strength and balance while standing, walking, or changing positions.  Foot health exam. Foot pain and numbness, as well as not wearing proper footwear, can make you more likely to have a fall.  Screenings, including:  Osteoporosis screening. Osteoporosis is a condition that causes the bones to get weaker and break more easily.  Blood pressure screening. Blood pressure changes and medicines to control blood pressure can make you feel dizzy.  Depression screening. You may be more likely to have a fall if you have a fear of falling, feel depressed, or feel unable to do activities that you used to do.  Alcohol use screening. Using too much alcohol can affect your balance and may make you more likely to have a fall.    Follow these instructions at home:  Lifestyle  Do not drink alcohol if:  Your health care provider tells you not to  drink.  If you drink alcohol:  Limit how much you have to:  0-1 drink a day for women.  0-2 drinks a day for men.  Know how much alcohol is in your drink. In the [place] one drink equals one 12 oz bottle of beer (355 mL), one 5 oz glass of wine (148 mL), or one 1½ oz glass of hard liquor (44 mL).  Do not use any products that contain nicotine or tobacco. These products include cigarettes, chewing tobacco, and vaping devices, such as e-cigarettes. If you need help quitting, ask your health care provider.    Activity    Follow a regular exercise program to stay fit. This will help you maintain your balance. Ask your health care provider what types of exercise are appropriate for you.  If you need a cane or walker, use it as recommended by your health care provider.  Wear supportive shoes that have nonskid soles.  Safety    Remove any tripping hazards, such as rugs, cords, and clutter.  Install safety equipment such as grab bars in bathrooms and safety rails on stairs.  Keep rooms and walkways well-lit.    General instructions  Talk with your health care provider about your risks for falling. Tell your health care provider if:  You fall. Be sure to tell your health care provider about all falls, even ones that seem minor.  You feel dizzy, tiredness (fatigue), or off-balance.  Take over-the-counter and prescription medicines only as told by your health care provider. These include supplements.  Eat a healthy diet and maintain a healthy weight. A healthy diet includes low-fat dairy products, low-fat (lean) meats, and fiber from whole grains, beans, and lots of fruits and vegetables.  Stay current with your vaccines.  Schedule regular health, dental, and eye exams.    Summary  Having a healthy lifestyle and getting preventive care can help to protect your health and wellness after age 65.  Screening and testing are the best way to find a health problem early and help you avoid having a fall. Early diagnosis and treatment  give you the best chance for managing medical conditions that are more common for people who are older than age 65.  Falls are a major cause of broken bones and head injuries in people who are older than age 65. Take precautions to prevent a fall at home.  Work with your health care provider to learn what changes you can make to improve your health and wellness and to prevent falls.    This information is not intended to replace advice given to you by your health care provider. Make sure you discuss any questions you have with your health care provider.  Document Revised: 05/09/2022 Document Reviewed: 05/09/2022  [employer] Patient Education © 2023 [employer]  Updated 2/29/24 tc     Fall Prevention in the Home, Adult  Falls can cause injuries and affect people of all ages. There are many simple things that you can do to make your home safe and to help prevent falls.  If you need it, ask for help making these changes.  What actions can I take to prevent falls?  General information  Use good lighting in all rooms. Make sure to:  Replace any light bulbs that burn out.  Turn on lights if it is dark and use night-lights.  Keep items that you use often in easy-to-reach places. Lower the shelves around your home if needed.  Move furniture so that there are clear paths around it.  Do not keep throw rugs or other things on the floor that can make you trip.  If any of your floors are uneven, fix them.  Add color or contrast paint or tape to clearly arsenio and help you see:  Grab bars or handrails.  First and last steps of staircases.  Where the edge of each step is.  If you use a ladder or stepladder:  Make sure that it is fully opened. Do not climb a closed ladder.  Make sure the sides of the ladder are locked in place.  Have someone hold the ladder while you use it.  Know where your pets are as you move through your home.  What can I do in the bathroom?         Keep the floor dry. Clean up any water that is on the floor  right away.  Remove soap buildup in the bathtub or shower. Buildup makes bathtubs and showers slippery.  Use non-skid mats or decals on the floor of the bathtub or shower.  Attach bath mats securely with double-sided, non-slip rug tape.  If you need to sit down while you are in the shower, use a non-slip stool.  Install grab bars by the toilet and in the bathtub and shower. Do not use towel bars as grab bars.  What can I do in the bedroom?  Make sure that you have a light by your bed that is easy to reach.  Do not use any sheets or blankets on your bed that hang to the floor.  Have a firm bench or chair with side arms that you can use for support when you get dressed.  What can I do in the kitchen?  Clean up any spills right away.  If you need to reach something above you, use a sturdy step stool that has a grab bar.  Keep electrical cables out of the way.  Do not use floor polish or wax that makes floors slippery.  What can I do with my stairs?  Do not leave anything on the stairs.  Make sure that you have a light switch at the top and the bottom of the stairs. Have them installed if you do not have them.  Make sure that there are handrails on both sides of the stairs. Fix handrails that are broken or loose. Make sure that handrails are as long as the staircases.  Install non-slip stair treads on all stairs in your home if they do not have carpet.  Avoid having throw rugs at the top or bottom of stairs, or secure the rugs with carpet tape to prevent them from moving.  Choose a carpet design that does not hide the edge of steps on the stairs. Make sure that carpet is firmly attached to the stairs. Fix any carpet that is loose or worn.  What can I do on the outside of my home?  Use bright outdoor lighting.  Repair the edges of walkways and driveways and fix any cracks. Clear paths of anything that can make you trip, such as tools or rocks.  Add color or contrast paint or tape to clearly arsenio and help you see high  doorway thresholds.  Trim any bushes or trees on the main path into your home.  Check that handrails are securely fastened and in good repair. Both sides of all steps should have handrails.  Install guardrails along the edges of any raised decks or porches.  Have leaves, snow, and ice cleared regularly. Use sand, salt, or ice melt on walkways during winter months if you live where there is ice and snow.  In the garage, clean up any spills right away, including grease or oil spills.  What other actions can I take?  Review your medicines with your health care provider. Some medicines can make you confused or feel dizzy. This can increase your chance of falling.  Wear closed-toe shoes that fit well and support your feet. Wear shoes that have rubber soles and low heels.  Use a cane, walker, scooter, or crutches that help you move around if needed.  Talk with your provider about other ways that you can decrease your risk of falls. This may include seeing a physical therapist to learn to do exercises to improve movement and strength.  Where to find more information  Centers for Disease Control and Prevention, [place]: cdc.gov  [place] on Aging: quintin.nih.gov  [place] on Aging: quintin.nih.gov  Contact a health care provider if:  You are afraid of falling at home.  You feel weak, drowsy, or dizzy at home.  You fall at home.  Get help right away if you:  Lose consciousness or have trouble moving after a fall.  Have a fall that causes a head injury.  These symptoms may be an emergency. Get help right away. Call 911.  Do not wait to see if the symptoms will go away.  Do not drive yourself to the hospital.  This information is not intended to replace advice given to you by your health care provider. Make sure you discuss any questions you have with your health care provider.  Document Revised: 08/21/2023 Document Reviewed: 08/21/2023  [employer] Patient Education © 2024 [employer]  Sit-to-Stand Exercise    The  sit-to-stand exercise (also known as the chair stand or chair rise exercise) strengthens your lower body and helps you maintain or improve your mobility and independence. The end goal is to do the sit-to-stand exercise without using your hands. This will be easier as you become stronger. You should always talk with your health care provider before starting any exercise program, especially if you have had recent surgery.  Do the exercise exactly as told by your health care provider and adjust it as directed. It is normal to feel mild stretching, pulling, tightness, or discomfort as you do this exercise, but you should stop right away if you feel sudden pain or your pain gets worse. Do not begin doing this exercise until told by your health care provider.  What the sit-to-stand exercise does  The sit-to-stand exercise helps to strengthen the muscles in your thighs and the muscles in the center of your body that give you stability (core muscles). This exercise is especially helpful if:  You have had knee or hip surgery.  You have trouble getting up from a chair, out of a car, or off the toilet due to muscle weakness.  How to do the sit-to-stand exercise  Sit toward the front edge of a sturdy chair without armrests. Your knees should be bent and your feet should be flat on the floor and shoulder-width apart and underneath your hips.  Place your hands lightly on each side of the seat. Keep your back and neck as straight as possible, with your chest slightly forward.  Breathe in slowly. Lean forward and slightly shift your weight to the front of your feet.  Breathe out as you slowly stand up. Try not to support any weight with your hands.  Stand and pause for a full breath in and out.  Breathe in as you sit down slowly. Tighten your core and abdominal muscles to control your lowering as much as possible. You should lower yourself back to the chair slowly, not just drop back into the seat.  Breathe out slowly.  Do this  exercise 10-15 times. If needed, do it fewer times until you build up strength.  Rest for 1 minute, then do another set of 10-15 repetitions.  To change the difficulty of the sit-to-stand exercise  If the exercise is too difficult, use a chair with sturdy armrests, and push off the armrests to help you come to the standing position. You can also use the armrests to help slowly lower yourself back to sitting. As this gets easier, try to use your arms less. You can also place a firm cushion or pillow on the chair to make the surface higher.  If this exercise is too easy, do not use your arms to help raise or lower yourself. You can also wear a weighted vest, use hand weights, increase your repetitions, or try a lower chair.  General tips  You may feel tired when starting an exercise routine. This is normal.  You may have muscle soreness that lasts a few days. This is normal. As you get stronger, you may not feel muscle soreness.  Use smooth, steady movements.  Do not  hold your breath during strength exercises. This can cause unsafe changes in your blood pressure.  Breathe in slowly through your nose, and breathe out slowly through your mouth.  Summary  Strengthening your lower body is an important step to help you move safely and independently.  The sit-to-stand exercise helps strengthen the muscles in your thighs and core.  You should always talk with your health care provider before starting any exercise program, especially if you have had recent surgery.  This information is not intended to replace advice given to you by your health care provider. Make sure you discuss any questions you have with your health care provider.  Document Revised: 04/10/2022 Document Reviewed: 04/10/2022  [employer] Patient Education © 2024 [employer]  Exercising to Stay Healthy  To become healthy and stay healthy, it is recommended that you do moderate-intensity and vigorous-intensity exercise. You can tell that you are exercising at  a moderate intensity if your heart starts beating faster and you start breathing faster but can still hold a conversation. You can tell that you are exercising at a vigorous intensity if you are breathing much harder and faster and cannot hold a conversation while exercising.  How can exercise benefit me?  Exercising regularly is important. It has many health benefits, such as:  Improving overall fitness, flexibility, and endurance.  Increasing bone density.  Helping with weight control.  Decreasing body fat.  Increasing muscle strength and endurance.  Reducing stress and tension, anxiety, depression, or anger.  Improving overall health.  What guidelines should I follow while exercising?  Before you start a new exercise program, talk with your health care provider.  Do not exercise so much that you hurt yourself, feel dizzy, or get very short of breath.  Wear comfortable clothes and wear shoes with good support.  Drink plenty of water while you exercise to prevent dehydration or heat stroke.  Work out until your breathing and your heartbeat get faster (moderate intensity).  How often should I exercise?  Choose an activity that you enjoy, and set realistic goals. Your health care provider can help you make an activity plan that is individually designed and works best for you.  Exercise regularly as told by your health care provider. This may include:  Doing strength training two times a week, such as:  Lifting weights.  Using resistance bands.  Push-ups.  Sit-ups.  Yoga.  Doing a certain intensity of exercise for a given amount of time. Choose from these options:  A total of 150 minutes of moderate-intensity exercise every week.  A total of 75 minutes of vigorous-intensity exercise every week.  A mix of moderate-intensity and vigorous-intensity exercise every week.  Children, pregnant women, people who have not exercised regularly, people who are overweight, and older adults may need to talk with a health care  provider about what activities are safe to perform. If you have a medical condition, be sure to talk with your health care provider before you start a new exercise program.  What are some exercise ideas?  Moderate-intensity exercise ideas include:  Walking 1 mile (1.6 km) in about 15 minutes.  Biking.  Hiking.  Golfing.  Dancing.  Water aerobics.  Vigorous-intensity exercise ideas include:  Walking 4.5 miles (7.2 km) or more in about 1 hour.  Jogging or running 5 miles (8 km) in about 1 hour.  Biking 10 miles (16.1 km) or more in about 1 hour.  Lap swimming.  Roller-skating or in-line skating.  Cross-country skiing.  Vigorous competitive sports, such as football, basketball, and soccer.  Jumping rope.  Aerobic dancing.  What are some everyday activities that can help me get exercise?  Yard work, such as:  Pushing a .  Raking and bagging leaves.  Washing your car.  Pushing a stroller.  Shoveling snow.  Gardening.  Washing windows or floors.  How can I be more active in my day-to-day activities?  Use stairs instead of an elevator.  Take a walk during your lunch break.  If you drive, park your car farther away from your work or school.  If you take public transportation, get off one stop early and walk the rest of the way.  Stand up or walk around during all of your indoor phone calls.  Get up, stretch, and walk around every 30 minutes throughout the day.  Enjoy exercise with a friend. Support to continue exercising will help you keep a regular routine of activity.  Where to find more information  You can find more information about exercising to stay healthy from:  [place] Department of Health and Human Services: www.hhs.gov  Centers for Disease Control and Prevention (CDC): www.cdc.gov  Summary  Exercising regularly is important. It will improve your overall fitness, flexibility, and endurance.  Regular exercise will also improve your overall health. It can help you control your weight, reduce stress, and  improve your bone density.  Do not exercise so much that you hurt yourself, feel dizzy, or get very short of breath.  Before you start a new exercise program, talk with your health care provider.  This information is not intended to replace advice given to you by your health care provider. Make sure you discuss any questions you have with your health care provider.  Document Revised: 04/15/2022 Document Reviewed: 04/15/2022  [employer]vier Patient Education © 2024 [employer]

## 2025-06-19 NOTE — TELEPHONE ENCOUNTER
Spoke to Kirk and he informed me that the oxygen walk test needs the o2 saturation on o2 at LPM with exertion completed. I told him I will have my provider to correct this form.

## 2025-06-24 DIAGNOSIS — M54.32 SCIATICA OF LEFT SIDE: ICD-10-CM

## 2025-06-24 PROCEDURE — 77301 RADIOTHERAPY DOSE PLAN IMRT: CPT | Performed by: RADIOLOGY

## 2025-06-24 PROCEDURE — 77338 DESIGN MLC DEVICE FOR IMRT: CPT | Performed by: RADIOLOGY

## 2025-06-24 PROCEDURE — 77300 RADIATION THERAPY DOSE PLAN: CPT | Performed by: RADIOLOGY

## 2025-06-24 RX ORDER — GABAPENTIN 100 MG/1
100 CAPSULE ORAL 3 TIMES DAILY
Qty: 270 CAPSULE | Refills: 1 | Status: SHIPPED | OUTPATIENT
Start: 2025-06-24

## 2025-06-24 NOTE — TELEPHONE ENCOUNTER
Caller: Pamela Herrera    Relationship: Self    Best call back number: [phone]     Requested Prescriptions:   Requested Prescriptions     Pending Prescriptions Disp Refills    gabapentin (NEURONTIN) 100 MG capsule 270 capsule 1     Sig: Take 1 capsule by mouth 3 (Three) Times a Day.        Pharmacy where request should be sent: [facility]/PHARMACY #[id number] - [address] [phone] [facility] [phone] FX     Last office visit with prescribing clinician: 6/19/2025   Last telemedicine visit with prescribing clinician: Visit date not found   Next office visit with prescribing clinician: Visit date not found     Additional details provided by patient: OUT OF MEDS

## 2025-06-30 ENCOUNTER — HOSPITAL ENCOUNTER (OUTPATIENT)
Facility: HOSPITAL | Age: 80
Discharge: HOME | End: 2025-06-30
Payer: MEDICARE

## 2025-06-30 PROCEDURE — 77386: CPT | Performed by: RADIOLOGY

## 2025-07-01 ENCOUNTER — HOSPITAL ENCOUNTER (OUTPATIENT)
Facility: HOSPITAL | Age: 80
Discharge: HOME | End: 2025-07-01

## 2025-07-02 ENCOUNTER — HOSPITAL ENCOUNTER (OUTPATIENT)
Facility: HOSPITAL | Age: 80
Discharge: HOME | End: 2025-07-02

## 2025-07-07 ENCOUNTER — TELEPHONE (OUTPATIENT)
Dept: PALLIATIVE CARE | Facility: CLINIC | Age: 80
End: 2025-07-07
Payer: MEDICARE

## 2025-07-07 DIAGNOSIS — G89.3 CANCER RELATED PAIN: ICD-10-CM

## 2025-07-07 DIAGNOSIS — C54.1 ENDOMETRIAL CANCER: Primary | ICD-10-CM

## 2025-07-07 NOTE — TELEPHONE ENCOUNTER
PATIENT DAUGHTER CALLED AND STATED THAT PATIENT ASKED HER DAUGHTER TO CONTACT PALLIATIVE OFFICE REGARDING HOSPICE AND STARTING THE PROCESS FOR THIS. PATIENT DAUGHTER NOTED THAT PATIENT HAD CHEMO LAST WEEK AND PATIENT DID NOT TOLERATE IT WELL. PLEASE ADVISE.

## 2025-07-08 ENCOUNTER — TELEPHONE (OUTPATIENT)
Dept: INTERNAL MEDICINE | Facility: CLINIC | Age: 80
End: 2025-07-08
Payer: MEDICARE

## 2025-07-08 NOTE — TELEPHONE ENCOUNTER
Hannah with hospice care plus left vm that she has received a referral for patient. She would like to know if dr matamoros will follow care and sign orders or defer to hospice. Call her at [phone] ext [phone]

## 2025-07-11 ENCOUNTER — TELEPHONE (OUTPATIENT)
Dept: PALLIATIVE CARE | Facility: CLINIC | Age: 80
End: 2025-07-11
Payer: MEDICARE